# Patient Record
Sex: MALE | Race: BLACK OR AFRICAN AMERICAN | NOT HISPANIC OR LATINO | Employment: OTHER | URBAN - METROPOLITAN AREA
[De-identification: names, ages, dates, MRNs, and addresses within clinical notes are randomized per-mention and may not be internally consistent; named-entity substitution may affect disease eponyms.]

---

## 2017-01-04 ENCOUNTER — ANTI-COAG VISIT (OUTPATIENT)
Dept: CARDIOLOGY | Facility: CLINIC | Age: 77
End: 2017-01-04

## 2017-01-04 ENCOUNTER — LAB VISIT (OUTPATIENT)
Dept: LAB | Facility: HOSPITAL | Age: 77
End: 2017-01-04
Attending: INTERNAL MEDICINE
Payer: MEDICARE

## 2017-01-04 DIAGNOSIS — Z79.01 LONG-TERM (CURRENT) USE OF ANTICOAGULANTS: ICD-10-CM

## 2017-01-04 LAB
INR PPP: 2.4
PROTHROMBIN TIME: 26.2 SEC

## 2017-01-04 PROCEDURE — 36415 COLL VENOUS BLD VENIPUNCTURE: CPT | Mod: PO

## 2017-01-04 PROCEDURE — 85610 PROTHROMBIN TIME: CPT | Mod: PO

## 2017-01-18 ENCOUNTER — ANTI-COAG VISIT (OUTPATIENT)
Dept: CARDIOLOGY | Facility: CLINIC | Age: 77
End: 2017-01-18

## 2017-01-18 ENCOUNTER — LAB VISIT (OUTPATIENT)
Dept: LAB | Facility: HOSPITAL | Age: 77
End: 2017-01-18
Attending: INTERNAL MEDICINE
Payer: MEDICARE

## 2017-01-18 DIAGNOSIS — Z79.01 LONG-TERM (CURRENT) USE OF ANTICOAGULANTS: ICD-10-CM

## 2017-01-18 LAB
INR PPP: 2.6
PROTHROMBIN TIME: 28.3 SEC

## 2017-01-18 PROCEDURE — 85610 PROTHROMBIN TIME: CPT | Mod: PO

## 2017-01-18 PROCEDURE — 36415 COLL VENOUS BLD VENIPUNCTURE: CPT | Mod: PO

## 2017-02-16 ENCOUNTER — ANTI-COAG VISIT (OUTPATIENT)
Dept: CARDIOLOGY | Facility: CLINIC | Age: 77
End: 2017-02-16

## 2017-02-16 ENCOUNTER — LAB VISIT (OUTPATIENT)
Dept: LAB | Facility: HOSPITAL | Age: 77
End: 2017-02-16
Attending: INTERNAL MEDICINE
Payer: MEDICARE

## 2017-02-16 DIAGNOSIS — Z79.01 LONG-TERM (CURRENT) USE OF ANTICOAGULANTS: ICD-10-CM

## 2017-02-16 DIAGNOSIS — E11.8 TYPE II OR UNSPECIFIED TYPE DIABETES MELLITUS WITH UNSPECIFIED COMPLICATION, NOT STATED AS UNCONTROLLED: Primary | ICD-10-CM

## 2017-02-16 LAB
ALBUMIN SERPL BCP-MCNC: 4.1 G/DL
ALP SERPL-CCNC: 122 IU/L
ALT SERPL W/O P-5'-P-CCNC: 26 IU/L
ANION GAP SERPL CALC-SCNC: 11 MMOL/L
AST SERPL-CCNC: 23 IU/L
BASOPHILS # BLD AUTO: 0.02 K/UL
BASOPHILS NFR BLD: 0.5 %
BILIRUB SERPL-MCNC: 0.6 MG/DL
BUN SERPL-MCNC: 14 MG/DL
CALCIUM SERPL-MCNC: 9.1 MG/DL
CHLORIDE SERPL-SCNC: 102 MMOL/L
CHOLEST/HDLC SERPL: 4.5 {RATIO}
CO2 SERPL-SCNC: 30 MMOL/L
CREAT SERPL-MCNC: 1.31 MG/DL
DIFFERENTIAL METHOD: ABNORMAL
EOSINOPHIL # BLD AUTO: 0.1 K/UL
EOSINOPHIL NFR BLD: 2.6 %
ERYTHROCYTE [DISTWIDTH] IN BLOOD BY AUTOMATED COUNT: 12.9 %
EST. GFR  (AFRICAN AMERICAN): >60 ML/MIN/1.73 M^2
EST. GFR  (NON AFRICAN AMERICAN): 52.1 ML/MIN/1.73 M^2
GLUCOSE SERPL-MCNC: 115 MG/DL
HCT VFR BLD AUTO: 37.8 %
HDL/CHOLESTEROL RATIO: 22 %
HDLC SERPL-MCNC: 150 MG/DL
HDLC SERPL-MCNC: 33 MG/DL
HGB BLD-MCNC: 12.5 G/DL
LDLC SERPL CALC-MCNC: 90.6 MG/DL
LYMPHOCYTES # BLD AUTO: 1.4 K/UL
LYMPHOCYTES NFR BLD: 36.2 %
MCH RBC QN AUTO: 31.6 PG
MCHC RBC AUTO-ENTMCNC: 33.1 %
MCV RBC AUTO: 96 FL
MONOCYTES # BLD AUTO: 0.4 K/UL
MONOCYTES NFR BLD: 10.3 %
NEUTROPHILS # BLD AUTO: 2 K/UL
NEUTROPHILS NFR BLD: 50.4 %
NONHDLC SERPL-MCNC: 117 MG/DL
PLATELET # BLD AUTO: 183 K/UL
PMV BLD AUTO: 10.6 FL
POTASSIUM SERPL-SCNC: 4.1 MMOL/L
PROT SERPL-MCNC: 7.5 G/DL
RBC # BLD AUTO: 3.95 M/UL
SODIUM SERPL-SCNC: 143 MMOL/L
TRIGL SERPL-MCNC: 132 MG/DL
TSH SERPL DL<=0.005 MIU/L-ACNC: 1.83 UIU/ML
WBC # BLD AUTO: 3.87 K/UL

## 2017-02-16 PROCEDURE — 85025 COMPLETE CBC W/AUTO DIFF WBC: CPT | Mod: PO

## 2017-02-16 PROCEDURE — 83036 HEMOGLOBIN GLYCOSYLATED A1C: CPT | Mod: PO

## 2017-02-16 PROCEDURE — 84443 ASSAY THYROID STIM HORMONE: CPT

## 2017-02-16 PROCEDURE — 80053 COMPREHEN METABOLIC PANEL: CPT | Mod: PO

## 2017-02-16 PROCEDURE — 80061 LIPID PANEL: CPT

## 2017-02-17 LAB
ESTIMATED AVG GLUCOSE: 117 MG/DL
HBA1C MFR BLD HPLC: 5.7 %

## 2017-03-23 ENCOUNTER — LAB VISIT (OUTPATIENT)
Dept: LAB | Facility: HOSPITAL | Age: 77
End: 2017-03-23
Attending: INTERNAL MEDICINE
Payer: MEDICARE

## 2017-03-23 ENCOUNTER — ANTI-COAG VISIT (OUTPATIENT)
Dept: CARDIOLOGY | Facility: CLINIC | Age: 77
End: 2017-03-23

## 2017-03-23 DIAGNOSIS — Z79.01 LONG-TERM (CURRENT) USE OF ANTICOAGULANTS: ICD-10-CM

## 2017-03-23 LAB
INR PPP: 2.6
PROTHROMBIN TIME: 28 SEC

## 2017-03-23 PROCEDURE — 36415 COLL VENOUS BLD VENIPUNCTURE: CPT | Mod: PO

## 2017-03-23 PROCEDURE — 85610 PROTHROMBIN TIME: CPT | Mod: PO

## 2017-03-31 ENCOUNTER — OFFICE VISIT (OUTPATIENT)
Dept: CARDIOLOGY | Facility: CLINIC | Age: 77
End: 2017-03-31
Payer: MEDICARE

## 2017-03-31 VITALS
HEIGHT: 72 IN | WEIGHT: 174 LBS | OXYGEN SATURATION: 97 % | BODY MASS INDEX: 23.57 KG/M2 | DIASTOLIC BLOOD PRESSURE: 64 MMHG | SYSTOLIC BLOOD PRESSURE: 114 MMHG

## 2017-03-31 DIAGNOSIS — I25.10 CORONARY ARTERY DISEASE INVOLVING NATIVE CORONARY ARTERY OF NATIVE HEART WITHOUT ANGINA PECTORIS: Primary | ICD-10-CM

## 2017-03-31 DIAGNOSIS — I34.0 NON-RHEUMATIC MITRAL REGURGITATION: ICD-10-CM

## 2017-03-31 DIAGNOSIS — I47.10 SVT (SUPRAVENTRICULAR TACHYCARDIA): ICD-10-CM

## 2017-03-31 DIAGNOSIS — I48.0 PAF (PAROXYSMAL ATRIAL FIBRILLATION): ICD-10-CM

## 2017-03-31 DIAGNOSIS — Z92.29 HX: LONG TERM ANTICOAGULANT USE: ICD-10-CM

## 2017-03-31 DIAGNOSIS — I25.5 ISCHEMIC CARDIOMYOPATHY: ICD-10-CM

## 2017-03-31 PROCEDURE — 99214 OFFICE O/P EST MOD 30 MIN: CPT | Mod: S$GLB,,, | Performed by: INTERNAL MEDICINE

## 2017-03-31 PROCEDURE — 99999 PR PBB SHADOW E&M-EST. PATIENT-LVL III: CPT | Mod: PBBFAC,,, | Performed by: INTERNAL MEDICINE

## 2017-03-31 NOTE — LETTER
March 31, 2017        Olivia Mckeon MD  [address]  [address]  [facility] [place] [address]             [facility] - Cardiology  [address], [address]  [place] [address]  Phone: [phone]  Fax: [phone]   Patient: Bright Spain   MR Number: [id number]   YOB: 1940   Date of Visit: 3/31/2017       Dear Dr. Mckeon:    Thank you for referring Bright Spain to me for evaluation. Attached you will find relevant portions of my assessment and plan of care.    ECGs reviewed-NSR with PACs  LABS reviewed  Imaging including Echoes reviewed    Diagnostic:     Patient has a right dominant coronary artery.     - Left Main Coronary Artery:  The LM has luminal irregularities. There is ЮЛИЯ 3 flow.  - Left Anterior Descending Artery:  The proximal LAD has a 80% stenosis. There is ЮЛИЯ 3 flow.  Lesion Details: Eccentric shape, segment angulation of <45 degrees, smooth contour, mild to moderate calcification. The lesion is not ulcerated. No bifurcation is present.  The mid LAD has a 80% stenosis. There is ЮЛИЯ 3 flow.  Lesion Details: Eccentric shape, segment angulation of 45-90 degrees, irregular contour, moderate to heavy calcification. The lesion is not ulcerated. No bifurcation is present.  - D1:  The ostial D1 has a 90% stenosis. There is ЛЮИЯ 3 flow.  Lesion Details: Eccentric shape, segment angulation of 45-90 degrees, irregular contour, moderate to heavy calcification. The lesion is not ulcerated. Bifurcation is present. severe calcific ostial stenosis of large D1 supplying posterolateral region of LV  - Left Circumflex Artery:  The LCX is normal. There is ЮЛИЯ 3 flow. The remaining portion of the vessel is of small caliber. diminutive LCx with D1 off LAD supplying postero-lateral wall  - Right Coronary Artery:  The proximal RCA has a 70% stenosis. There is ЮЛИЯ 3 flow.  Lesion Details: Eccentric shape, moderate proximal tortuosity, segment angulation of >90 degrees, smooth contour,  none to mild calcification. The lesion is not ulcerated. No bifurcation is present.  The mid RCA has a 90% stenosis. There is ЮЛИЯ 3 flow.  Lesion Details: Concentric shape, severe proximal tortuosity, segment angulation of <45 degrees, smooth contour, none to mild calcification. The lesion is not ulcerated. No bifurcation is present.     Assessment:     1. Coronary artery disease involving native coronary artery of native heart without angina pectoris    2. Ischemic cardiomyopathy    3. Non-rheumatic mitral regurgitation    4. SVT (supraventricular tachycardia)    5. Chronic atrial fibrillation    6. HX: long term anticoagulant use        Plan:     Continue medications including asa 81 mg po daily  Activity as tolerated  F/u in 6 months. Patient want to be treated medically.     If you have questions, please do not hesitate to call me. I look forward to following Bright Spain along with you.    Sincerely,      Goldie Stuart MD            CC  No Recipients    Enclosure

## 2017-03-31 NOTE — PROGRESS NOTES
Subjective:   Patient ID:  Bright Spain is a 76 y.o. male who presents for follow-up of Follow-up      Problem List Items Addressed This Visit        Cardiac    SVT (supraventricular tachycardia)    Mitral regurgitation    Coronary artery disease involving native coronary artery of native heart without angina pectoris - Primary    Ischemic cardiomyopathy    PAF (paroxysmal atrial fibrillation)       Other    HX: long term anticoagulant use          HPI: Patient is here for f/u of severe CAD. As per previous discussion patient thought the process to get him stents or CABG was too slow so he is no longer want to do either. He denies chest pain or SOB. No orthopnea or PND. BP is control.    He wants to start cutting grass again.      Review of Systems   Constitution: Negative.   HENT: Negative.    Eyes: Negative.    Cardiovascular: Negative.    Respiratory: Negative.    Endocrine: Negative.    Hematologic/Lymphatic: Negative.    Skin: Negative.    Musculoskeletal: Negative.    Gastrointestinal: Negative.    Neurological: Negative.        Patient's Medications   New Prescriptions    No medications on file   Previous Medications    ALLOPURINOL (ZYLOPRIM) 300 MG TABLET    Take 300 mg by mouth once daily.    AMIODARONE (PACERONE) 400 MG TABLET    Take 1 tablet (400 mg total) by mouth once daily.    ASPIRIN (ECOTRIN) 81 MG EC TABLET    Take 81 mg by mouth once daily.    ATORVASTATIN (LIPITOR) 40 MG TABLET    Take 40 mg by mouth once daily.    CARTIA  MG 24 HR CAPSULE        CARVEDILOL (COREG) 12.5 MG TABLET    Take 1 tablet (12.5 mg total) by mouth 2 (two) times daily.    FINASTERIDE (PROSCAR) 5 MG TABLET    Take 5 mg by mouth once daily.    FUROSEMIDE (LASIX) 20 MG TABLET    Take 1 tablet (20 mg total) by mouth once daily.    LOSARTAN (COZAAR) 100 MG TABLET    Take 1 tablet (100 mg total) by mouth once daily.    MULTIVITAMIN WITH MINERALS TABLET    Take 1 tablet by mouth once daily.    NITROGLYCERIN (NITROSTAT) 0.4  MG SL TABLET    Place 1 tablet (0.4 mg total) under the tongue every 5 (five) minutes as needed for Chest pain.    POTASSIUM CHLORIDE SA (K-DUR,KLOR-CON) 20 MEQ TABLET    Take 1 tablet (20 mEq total) by mouth once daily.    TAMSULOSIN (FLOMAX) 0.4 MG CP24    Take 0.4 mg by mouth once daily.    WARFARIN (COUMADIN) 4 MG TABLET    Take 2 & 1/2 tablets daily except 2 tablets on Thursday or as directed by [facility]   Modified Medications    No medications on file   Discontinued Medications    No medications on file       Objective:   Physical Exam   Constitutional: He is oriented to person, place, and time. He appears well-developed and well-nourished. No distress.   Examination of the digits showed no clubbing or cyanosis   HENT:   Head: Normocephalic and atraumatic.   Eyes: Conjunctivae are normal. Pupils are equal, round, and reactive to light. Right eye exhibits no discharge.   Neck: Normal range of motion. Neck supple. No JVD present. No thyromegaly present.   No carotid bruits   Cardiovascular: Normal rate, regular rhythm, S1 normal, S2 normal, normal heart sounds, intact distal pulses and normal pulses.  PMI is not displaced.  Exam reveals no gallop, no friction rub and no opening snap.    No murmur heard.  Pulmonary/Chest: Effort normal and breath sounds normal. No respiratory distress. He has no wheezes. He has no rales. He exhibits no tenderness.   Abdominal: Soft. Bowel sounds are normal. He exhibits no distension and no mass. There is no tenderness. There is no guarding.   No hepatosplenomegaly   Musculoskeletal: Normal range of motion. He exhibits no edema or tenderness.   Lymphadenopathy:     He has no cervical adenopathy.   Neurological: He is alert and oriented to person, place, and time.   Skin: Skin is warm. No rash noted. He is not diaphoretic. No erythema.   Psychiatric: He has a normal mood and affect.   Nursing note and vitals reviewed.      ECGs reviewed-NSR with PACs  LABS reviewed  Imaging  including Echoes reviewed    Diagnostic:     Patient has a right dominant coronary artery.     - Left Main Coronary Artery:  The LM has luminal irregularities. There is ЮЛИЯ 3 flow.  - Left Anterior Descending Artery:  The proximal LAD has a 80% stenosis. There is ЮЛИЯ 3 flow.  Lesion Details: Eccentric shape, segment angulation of <45 degrees, smooth contour, mild to moderate calcification. The lesion is not ulcerated. No bifurcation is present.  The mid LAD has a 80% stenosis. There is ЮЛИЯ 3 flow.  Lesion Details: Eccentric shape, segment angulation of 45-90 degrees, irregular contour, moderate to heavy calcification. The lesion is not ulcerated. No bifurcation is present.  - D1:  The ostial D1 has a 90% stenosis. There is ЮЛИЯ 3 flow.  Lesion Details: Eccentric shape, segment angulation of 45-90 degrees, irregular contour, moderate to heavy calcification. The lesion is not ulcerated. Bifurcation is present. severe calcific ostial stenosis of large D1 supplying posterolateral region of LV  - Left Circumflex Artery:  The LCX is normal. There is ЮЛИЯ 3 flow. The remaining portion of the vessel is of small caliber. diminutive LCx with D1 off LAD supplying postero-lateral wall  - Right Coronary Artery:  The proximal RCA has a 70% stenosis. There is ЮЛИЯ 3 flow.  Lesion Details: Eccentric shape, moderate proximal tortuosity, segment angulation of >90 degrees, smooth contour, none to mild calcification. The lesion is not ulcerated. No bifurcation is present.  The mid RCA has a 90% stenosis. There is ЮЛИЯ 3 flow.  Lesion Details: Concentric shape, severe proximal tortuosity, segment angulation of <45 degrees, smooth contour, none to mild calcification. The lesion is not ulcerated. No bifurcation is present.     Assessment:     1. Coronary artery disease involving native coronary artery of native heart without angina pectoris    2. Ischemic cardiomyopathy    3. Non-rheumatic mitral regurgitation    4. SVT  (supraventricular tachycardia)    5. HX: long term anticoagulant use    6. PAF (paroxysmal atrial fibrillation)        Plan:     Continue medications including asa 81 mg po daily  Activity as tolerated  F/u in 6 months. Patient want to be treated medically.

## 2017-04-10 ENCOUNTER — TELEPHONE (OUTPATIENT)
Dept: CARDIOTHORACIC SURGERY | Facility: CLINIC | Age: 77
End: 2017-04-10

## 2017-04-10 NOTE — TELEPHONE ENCOUNTER
Pt did not show up for his scheduled CT scan and Dr. Hermosillo f/u. Called to check on the pt, he reports that he is no long interested in finding out what is going on with his lung nodules. He was prepared for surgery last year and after surgery was not performed, he is no longer interested in following up with his pulmonary nodules.   He voices understanding that IR biopsy was performed and the nodules were smaller in size, today's appt was for a f/u CT to assess if the nodules had changed in size. Pt does not want to r/s these appts, cancelled both CT and Dr. Hermosillo.

## 2017-04-11 NOTE — PROGRESS NOTES
I'm sorry for the confusion, but we do not provide advise regarding ASA 81mg.  (We will ask pts to avoid higher doses of ASA used for pain treatment.)  Regardless, I would recommend that he follows his cardiologist's advise regarding ASA.

## 2017-04-11 NOTE — PROGRESS NOTES
Per patient we stopped him from taking a Baby ASA about 6 months ago.  Patient reports Dr Stuart wants him to restart Baby ASA.

## 2017-04-27 ENCOUNTER — ANTI-COAG VISIT (OUTPATIENT)
Dept: CARDIOLOGY | Facility: CLINIC | Age: 77
End: 2017-04-27

## 2017-04-27 ENCOUNTER — LAB VISIT (OUTPATIENT)
Dept: LAB | Facility: HOSPITAL | Age: 77
End: 2017-04-27
Attending: INTERNAL MEDICINE
Payer: MEDICARE

## 2017-04-27 DIAGNOSIS — Z79.01 LONG-TERM (CURRENT) USE OF ANTICOAGULANTS: ICD-10-CM

## 2017-04-27 LAB
INR PPP: 2
PROTHROMBIN TIME: 21.5 SEC

## 2017-04-27 PROCEDURE — 36415 COLL VENOUS BLD VENIPUNCTURE: CPT | Mod: PO

## 2017-04-27 PROCEDURE — 85610 PROTHROMBIN TIME: CPT | Mod: PO

## 2017-06-08 ENCOUNTER — ANTI-COAG VISIT (OUTPATIENT)
Dept: CARDIOLOGY | Facility: CLINIC | Age: 77
End: 2017-06-08

## 2017-06-08 ENCOUNTER — LAB VISIT (OUTPATIENT)
Dept: LAB | Facility: HOSPITAL | Age: 77
End: 2017-06-08
Attending: INTERNAL MEDICINE
Payer: MEDICARE

## 2017-06-08 DIAGNOSIS — Z79.01 LONG-TERM (CURRENT) USE OF ANTICOAGULANTS: ICD-10-CM

## 2017-06-08 LAB
INR PPP: 2.2
PROTHROMBIN TIME: 23.9 SEC

## 2017-06-08 PROCEDURE — 36415 COLL VENOUS BLD VENIPUNCTURE: CPT | Mod: PO

## 2017-06-08 PROCEDURE — 85610 PROTHROMBIN TIME: CPT | Mod: PO

## 2017-07-21 ENCOUNTER — ANTI-COAG VISIT (OUTPATIENT)
Dept: CARDIOLOGY | Facility: CLINIC | Age: 77
End: 2017-07-21

## 2017-07-21 ENCOUNTER — LAB VISIT (OUTPATIENT)
Dept: LAB | Facility: HOSPITAL | Age: 77
End: 2017-07-21
Attending: INTERNAL MEDICINE
Payer: MEDICARE

## 2017-07-21 DIAGNOSIS — Z79.01 LONG-TERM (CURRENT) USE OF ANTICOAGULANTS: ICD-10-CM

## 2017-07-21 LAB
INR PPP: 3.1
PROTHROMBIN TIME: 33.3 SEC

## 2017-07-21 PROCEDURE — 36415 COLL VENOUS BLD VENIPUNCTURE: CPT | Mod: PO

## 2017-07-21 PROCEDURE — 85610 PROTHROMBIN TIME: CPT | Mod: PO

## 2017-08-16 ENCOUNTER — ANTI-COAG VISIT (OUTPATIENT)
Dept: CARDIOLOGY | Facility: CLINIC | Age: 77
End: 2017-08-16

## 2017-08-16 ENCOUNTER — LAB VISIT (OUTPATIENT)
Dept: LAB | Facility: HOSPITAL | Age: 77
End: 2017-08-16
Attending: INTERNAL MEDICINE
Payer: MEDICARE

## 2017-08-16 DIAGNOSIS — Z79.01 LONG-TERM (CURRENT) USE OF ANTICOAGULANTS: ICD-10-CM

## 2017-08-16 LAB
INR PPP: 1.6
PROTHROMBIN TIME: 17.8 SEC

## 2017-08-16 PROCEDURE — 85610 PROTHROMBIN TIME: CPT | Mod: PO

## 2017-08-16 PROCEDURE — 36415 COLL VENOUS BLD VENIPUNCTURE: CPT | Mod: PO

## 2017-08-28 ENCOUNTER — ANTI-COAG VISIT (OUTPATIENT)
Dept: CARDIOLOGY | Facility: CLINIC | Age: 77
End: 2017-08-28

## 2017-08-28 ENCOUNTER — LAB VISIT (OUTPATIENT)
Dept: LAB | Facility: HOSPITAL | Age: 77
End: 2017-08-28
Attending: INTERNAL MEDICINE
Payer: MEDICARE

## 2017-08-28 DIAGNOSIS — Z79.01 LONG-TERM (CURRENT) USE OF ANTICOAGULANTS: ICD-10-CM

## 2017-08-28 LAB
INR PPP: 2.9
PROTHROMBIN TIME: 31.5 SEC

## 2017-08-28 PROCEDURE — 36415 COLL VENOUS BLD VENIPUNCTURE: CPT | Mod: PO

## 2017-08-28 PROCEDURE — 85610 PROTHROMBIN TIME: CPT | Mod: PO

## 2017-09-12 ENCOUNTER — LAB VISIT (OUTPATIENT)
Dept: LAB | Facility: HOSPITAL | Age: 77
End: 2017-09-12
Attending: INTERNAL MEDICINE
Payer: MEDICARE

## 2017-09-12 ENCOUNTER — ANTI-COAG VISIT (OUTPATIENT)
Dept: CARDIOLOGY | Facility: CLINIC | Age: 77
End: 2017-09-12

## 2017-09-12 DIAGNOSIS — Z79.01 LONG-TERM (CURRENT) USE OF ANTICOAGULANTS: ICD-10-CM

## 2017-09-12 LAB
INR PPP: 2.3
PROTHROMBIN TIME: 24.4 SEC

## 2017-09-12 PROCEDURE — 36415 COLL VENOUS BLD VENIPUNCTURE: CPT | Mod: PO

## 2017-09-12 PROCEDURE — 85610 PROTHROMBIN TIME: CPT | Mod: PO

## 2017-10-03 ENCOUNTER — LAB VISIT (OUTPATIENT)
Dept: LAB | Facility: HOSPITAL | Age: 77
End: 2017-10-03
Attending: INTERNAL MEDICINE
Payer: MEDICARE

## 2017-10-03 ENCOUNTER — ANTI-COAG VISIT (OUTPATIENT)
Dept: CARDIOLOGY | Facility: CLINIC | Age: 77
End: 2017-10-03

## 2017-10-03 DIAGNOSIS — Z79.01 LONG-TERM (CURRENT) USE OF ANTICOAGULANTS: ICD-10-CM

## 2017-10-03 LAB
INR PPP: 2.8
PROTHROMBIN TIME: 29.7 SEC

## 2017-10-03 PROCEDURE — 85610 PROTHROMBIN TIME: CPT | Mod: PO

## 2017-10-03 PROCEDURE — 36415 COLL VENOUS BLD VENIPUNCTURE: CPT | Mod: PO

## 2017-10-03 NOTE — PROGRESS NOTES
Patient was given lab result, reports no changes, nothing new, no bleeding, verified correct dose of coumadin

## 2017-10-24 ENCOUNTER — LAB VISIT (OUTPATIENT)
Dept: LAB | Facility: HOSPITAL | Age: 77
End: 2017-10-24
Attending: INTERNAL MEDICINE
Payer: MEDICARE

## 2017-10-24 ENCOUNTER — ANTI-COAG VISIT (OUTPATIENT)
Dept: CARDIOLOGY | Facility: CLINIC | Age: 77
End: 2017-10-24

## 2017-10-24 DIAGNOSIS — Z79.01 LONG-TERM (CURRENT) USE OF ANTICOAGULANTS: ICD-10-CM

## 2017-10-24 LAB
INR PPP: 2.4
PROTHROMBIN TIME: 26.1 SEC

## 2017-10-24 PROCEDURE — 36415 COLL VENOUS BLD VENIPUNCTURE: CPT | Mod: PO

## 2017-10-24 PROCEDURE — 85610 PROTHROMBIN TIME: CPT | Mod: PO

## 2017-11-03 ENCOUNTER — OFFICE VISIT (OUTPATIENT)
Dept: CARDIOLOGY | Facility: CLINIC | Age: 77
End: 2017-11-03
Payer: MEDICARE

## 2017-11-03 VITALS
HEIGHT: 72 IN | SYSTOLIC BLOOD PRESSURE: 129 MMHG | OXYGEN SATURATION: 97 % | WEIGHT: 170.5 LBS | HEART RATE: 66 BPM | BODY MASS INDEX: 23.09 KG/M2 | DIASTOLIC BLOOD PRESSURE: 71 MMHG

## 2017-11-03 DIAGNOSIS — I48.0 PAROXYSMAL ATRIAL FIBRILLATION: Primary | ICD-10-CM

## 2017-11-03 DIAGNOSIS — I27.20 PULMONARY HTN: ICD-10-CM

## 2017-11-03 DIAGNOSIS — I10 ESSENTIAL HYPERTENSION: ICD-10-CM

## 2017-11-03 DIAGNOSIS — I25.10 CORONARY ARTERY DISEASE INVOLVING NATIVE CORONARY ARTERY OF NATIVE HEART WITHOUT ANGINA PECTORIS: ICD-10-CM

## 2017-11-03 DIAGNOSIS — I47.10 SVT (SUPRAVENTRICULAR TACHYCARDIA): ICD-10-CM

## 2017-11-03 DIAGNOSIS — I34.0 MODERATE MITRAL INSUFFICIENCY: ICD-10-CM

## 2017-11-03 DIAGNOSIS — I25.5 ISCHEMIC CARDIOMYOPATHY: ICD-10-CM

## 2017-11-03 PROCEDURE — 99214 OFFICE O/P EST MOD 30 MIN: CPT | Mod: S$GLB,,, | Performed by: INTERNAL MEDICINE

## 2017-11-03 PROCEDURE — 99499 UNLISTED E&M SERVICE: CPT | Mod: S$GLB,,, | Performed by: INTERNAL MEDICINE

## 2017-11-03 PROCEDURE — 99999 PR PBB SHADOW E&M-EST. PATIENT-LVL III: CPT | Mod: PBBFAC,,, | Performed by: INTERNAL MEDICINE

## 2017-11-03 NOTE — PROGRESS NOTES
Patient, Bright Spain (MRN #[id number]), presented with a recent Estimated PA Systolic Pressure greater than 40 mmHG consistent with the definition of pulmonary hypertension (ICD10 - I27.0).    Est. PA Systolic Pressure   Date Value Ref Range Status   04/11/2016 58.56 (A)       The patient's pulmonary hypertension was monitored, evaluated, addressed and/or treated. This addendum to the medical record is made on 11/03/2017.

## 2017-11-03 NOTE — PROGRESS NOTES
Subjective:   Patient ID:  Bright Spain is a 77 y.o. male who presents for follow-up of Follow-up      Problem List Items Addressed This Visit        Pulmonary    Pulmonary HTN       Cardiac/Vascular    Essential hypertension    Atrial fibrillation - Primary    SVT (supraventricular tachycardia)    Coronary artery disease involving native coronary artery of native heart without angina pectoris    Ischemic cardiomyopathy    Moderate mitral insufficiency          HPI: Patient is here for f/u of severe CAD with ICM. He is doing well with no complaints.  He denies chest pain or SOB. No orthopnea or PND. BP is control. Patient is being treated for severe CAD, CABG was recommended but patient not interested. He is active cutting grass 2 times weekly.         Review of Systems   Constitution: Negative.   HENT: Negative.    Eyes: Negative.    Cardiovascular: Negative.    Respiratory: Negative.    Endocrine: Negative.    Hematologic/Lymphatic: Negative.    Skin: Negative.    Musculoskeletal: Negative.    Gastrointestinal: Negative.    Neurological: Negative.        Patient's Medications   New Prescriptions    No medications on file   Previous Medications    ALLOPURINOL (ZYLOPRIM) 300 MG TABLET    Take 300 mg by mouth once daily.    AMIODARONE (PACERONE) 400 MG TABLET    Take 1 tablet (400 mg total) by mouth once daily.    ASPIRIN (ECOTRIN) 81 MG EC TABLET    Take 81 mg by mouth once daily.    ATORVASTATIN (LIPITOR) 40 MG TABLET    Take 40 mg by mouth once daily.    CARTIA  MG 24 HR CAPSULE        CARVEDILOL (COREG) 12.5 MG TABLET    Take 1 tablet (12.5 mg total) by mouth 2 (two) times daily.    FINASTERIDE (PROSCAR) 5 MG TABLET    Take 5 mg by mouth once daily.    FUROSEMIDE (LASIX) 20 MG TABLET    Take 1 tablet (20 mg total) by mouth once daily.    LOSARTAN (COZAAR) 100 MG TABLET    Take 1 tablet (100 mg total) by mouth once daily.    MULTIVITAMIN WITH MINERALS TABLET    Take 1 tablet by mouth once daily.     NITROGLYCERIN (NITROSTAT) 0.4 MG SL TABLET    Place 1 tablet (0.4 mg total) under the tongue every 5 (five) minutes as needed for Chest pain.    POTASSIUM CHLORIDE SA (K-DUR,KLOR-CON) 20 MEQ TABLET    Take 1 tablet (20 mEq total) by mouth once daily.    TAMSULOSIN (FLOMAX) 0.4 MG CP24    Take 0.4 mg by mouth once daily.    WARFARIN (COUMADIN) 4 MG TABLET    Take 2 & 1/2 tablets daily except 2 tablets on Thursday or as directed by [facility]   Modified Medications    No medications on file   Discontinued Medications    No medications on file       Objective:   Physical Exam   Constitutional: He is oriented to person, place, and time. He appears well-developed and well-nourished. No distress.   Examination of the digits showed no clubbing or cyanosis   HENT:   Head: Normocephalic and atraumatic.   Eyes: Conjunctivae are normal. Pupils are equal, round, and reactive to light. Right eye exhibits no discharge.   Neck: Normal range of motion. Neck supple. No JVD present. No thyromegaly present.   No carotid bruits   Cardiovascular: Normal rate, regular rhythm, S1 normal, S2 normal, normal heart sounds, intact distal pulses and normal pulses.  PMI is not displaced.  Exam reveals no gallop, no friction rub and no opening snap.    No murmur heard.  Pulmonary/Chest: Effort normal and breath sounds normal. No respiratory distress. He has no wheezes. He has no rales. He exhibits no tenderness.   Abdominal: Soft. Bowel sounds are normal. He exhibits no distension and no mass. There is no tenderness. There is no guarding.   No hepatosplenomegaly   Musculoskeletal: Normal range of motion. He exhibits no edema or tenderness.   Lymphadenopathy:     He has no cervical adenopathy.   Neurological: He is alert and oriented to person, place, and time.   Skin: Skin is warm. No rash noted. He is not diaphoretic. No erythema.   Psychiatric: He has a normal mood and affect.   Nursing note and vitals reviewed.      ECGs reviewed-NSR with  PACs  LABS reviewed  Imaging including Echoes reviewed    Diagnostic:     Patient has a right dominant coronary artery.     - Left Main Coronary Artery:  The LM has luminal irregularities. There is ЮЛИЯ 3 flow.  - Left Anterior Descending Artery:  The proximal LAD has a 80% stenosis. There is ЮЛИЯ 3 flow.  Lesion Details: Eccentric shape, segment angulation of <45 degrees, smooth contour, mild to moderate calcification. The lesion is not ulcerated. No bifurcation is present.  The mid LAD has a 80% stenosis. There is ЮЛИЯ 3 flow.  Lesion Details: Eccentric shape, segment angulation of 45-90 degrees, irregular contour, moderate to heavy calcification. The lesion is not ulcerated. No bifurcation is present.  - D1:  The ostial D1 has a 90% stenosis. There is ЮЛИЯ 3 flow.  Lesion Details: Eccentric shape, segment angulation of 45-90 degrees, irregular contour, moderate to heavy calcification. The lesion is not ulcerated. Bifurcation is present. severe calcific ostial stenosis of large D1 supplying posterolateral region of LV  - Left Circumflex Artery:  The LCX is normal. There is ЮЛИЯ 3 flow. The remaining portion of the vessel is of small caliber. diminutive LCx with D1 off LAD supplying postero-lateral wall  - Right Coronary Artery:  The proximal RCA has a 70% stenosis. There is ЮЛИЯ 3 flow.  Lesion Details: Eccentric shape, moderate proximal tortuosity, segment angulation of >90 degrees, smooth contour, none to mild calcification. The lesion is not ulcerated. No bifurcation is present.  The mid RCA has a 90% stenosis. There is ЮЛИЯ 3 flow.  Lesion Details: Concentric shape, severe proximal tortuosity, segment angulation of <45 degrees, smooth contour, none to mild calcification. The lesion is not ulcerated. No bifurcation is present.     Assessment:     1. Paroxysmal atrial fibrillation    2. Essential hypertension    3. SVT (supraventricular tachycardia)    4. Coronary artery disease involving native coronary artery  of native heart without angina pectoris    5. Ischemic cardiomyopathy    6. Moderate mitral insufficiency    7. Pulmonary HTN        Plan:     Continue medications including asa 81 mg po daily  Activity as tolerated  Low salt diet  F/u in 6 months.

## 2017-11-17 ENCOUNTER — LAB VISIT (OUTPATIENT)
Dept: LAB | Facility: HOSPITAL | Age: 77
End: 2017-11-17
Attending: INTERNAL MEDICINE
Payer: MEDICARE

## 2017-11-17 DIAGNOSIS — E11.9 DIABETES MELLITUS WITHOUT COMPLICATION: Primary | ICD-10-CM

## 2017-11-17 LAB
ALBUMIN SERPL BCP-MCNC: 4.4 G/DL
ALP SERPL-CCNC: 114 U/L
ALT SERPL W/O P-5'-P-CCNC: 26 U/L
ANION GAP SERPL CALC-SCNC: 14 MMOL/L
AST SERPL-CCNC: 28 U/L
BASOPHILS # BLD AUTO: 0.03 K/UL
BASOPHILS NFR BLD: 0.8 %
BILIRUB SERPL-MCNC: 0.7 MG/DL
BUN SERPL-MCNC: 15 MG/DL
CALCIUM SERPL-MCNC: 9.1 MG/DL
CHLORIDE SERPL-SCNC: 101 MMOL/L
CHOLEST SERPL-MCNC: 168 MG/DL
CHOLEST/HDLC SERPL: 4.2 {RATIO}
CO2 SERPL-SCNC: 28 MMOL/L
CREAT SERPL-MCNC: 1.22 MG/DL
CREAT UR-MCNC: 47 MG/DL
DIFFERENTIAL METHOD: ABNORMAL
EOSINOPHIL # BLD AUTO: 0.1 K/UL
EOSINOPHIL NFR BLD: 2.1 %
ERYTHROCYTE [DISTWIDTH] IN BLOOD BY AUTOMATED COUNT: 13.6 %
EST. GFR  (AFRICAN AMERICAN): >60 ML/MIN/1.73 M^2
EST. GFR  (NON AFRICAN AMERICAN): 56.4 ML/MIN/1.73 M^2
ESTIMATED AVG GLUCOSE: 117 MG/DL
GLUCOSE SERPL-MCNC: 89 MG/DL
HBA1C MFR BLD HPLC: 5.7 %
HCT VFR BLD AUTO: 39.7 %
HDLC SERPL-MCNC: 40 MG/DL
HDLC SERPL: 23.8 %
HGB BLD-MCNC: 12.7 G/DL
LDLC SERPL CALC-MCNC: 104.4 MG/DL
LYMPHOCYTES # BLD AUTO: 1.3 K/UL
LYMPHOCYTES NFR BLD: 35 %
MCH RBC QN AUTO: 31.1 PG
MCHC RBC AUTO-ENTMCNC: 32 G/DL
MCV RBC AUTO: 97 FL
MICROALBUMIN UR DL<=1MG/L-MCNC: <2.5 UG/ML
MICROALBUMIN/CREATININE RATIO: NORMAL UG/MG
MONOCYTES # BLD AUTO: 0.4 K/UL
MONOCYTES NFR BLD: 10.9 %
NEUTROPHILS # BLD AUTO: 1.9 K/UL
NEUTROPHILS NFR BLD: 50.9 %
NONHDLC SERPL-MCNC: 128 MG/DL
PLATELET # BLD AUTO: 179 K/UL
PMV BLD AUTO: 10.8 FL
POTASSIUM SERPL-SCNC: 4.4 MMOL/L
PROT SERPL-MCNC: 7.6 G/DL
RBC # BLD AUTO: 4.08 M/UL
SODIUM SERPL-SCNC: 143 MMOL/L
TRIGL SERPL-MCNC: 118 MG/DL
TSH SERPL DL<=0.005 MIU/L-ACNC: 2.63 UIU/ML
WBC # BLD AUTO: 3.77 K/UL

## 2017-11-17 PROCEDURE — 80053 COMPREHEN METABOLIC PANEL: CPT | Mod: PO

## 2017-11-17 PROCEDURE — 83036 HEMOGLOBIN GLYCOSYLATED A1C: CPT | Mod: PO

## 2017-11-17 PROCEDURE — 85025 COMPLETE CBC W/AUTO DIFF WBC: CPT | Mod: PO

## 2017-11-17 PROCEDURE — 82570 ASSAY OF URINE CREATININE: CPT | Mod: PO

## 2017-11-17 PROCEDURE — 80061 LIPID PANEL: CPT

## 2017-11-17 PROCEDURE — 84443 ASSAY THYROID STIM HORMONE: CPT | Mod: PO

## 2017-11-17 PROCEDURE — 36415 COLL VENOUS BLD VENIPUNCTURE: CPT | Mod: PO

## 2017-12-05 ENCOUNTER — LAB VISIT (OUTPATIENT)
Dept: LAB | Facility: HOSPITAL | Age: 77
End: 2017-12-05
Attending: INTERNAL MEDICINE
Payer: MEDICARE

## 2017-12-05 ENCOUNTER — ANTI-COAG VISIT (OUTPATIENT)
Dept: CARDIOLOGY | Facility: CLINIC | Age: 77
End: 2017-12-05

## 2017-12-05 DIAGNOSIS — Z79.01 LONG TERM (CURRENT) USE OF ANTICOAGULANTS: Primary | ICD-10-CM

## 2017-12-05 DIAGNOSIS — Z79.01 LONG-TERM (CURRENT) USE OF ANTICOAGULANTS: ICD-10-CM

## 2017-12-05 LAB
INR PPP: 2.5
PROTHROMBIN TIME: 26.9 SEC

## 2017-12-05 PROCEDURE — 85610 PROTHROMBIN TIME: CPT | Mod: PO

## 2017-12-05 PROCEDURE — 36415 COLL VENOUS BLD VENIPUNCTURE: CPT | Mod: PO

## 2018-01-08 NOTE — PROGRESS NOTES
Patient called 01/08/18 to inform us that on 1/16/18 He will have to go to ([email or web address]) because of his insurance. Patient will call us with info from ([facility]).

## 2018-01-17 ENCOUNTER — ANTI-COAG VISIT (OUTPATIENT)
Dept: CARDIOLOGY | Facility: CLINIC | Age: 78
End: 2018-01-17

## 2018-01-17 LAB — INR PPP: 2.5

## 2018-02-27 LAB — INR PPP: 3.5

## 2018-02-28 ENCOUNTER — ANTI-COAG VISIT (OUTPATIENT)
Dept: CARDIOLOGY | Facility: CLINIC | Age: 78
End: 2018-02-28

## 2018-03-13 LAB — INR PPP: 2.2

## 2018-03-14 ENCOUNTER — ANTI-COAG VISIT (OUTPATIENT)
Dept: CARDIOLOGY | Facility: CLINIC | Age: 78
End: 2018-03-14

## 2018-04-24 LAB — INR PPP: 2.7

## 2018-04-25 ENCOUNTER — ANTI-COAG VISIT (OUTPATIENT)
Dept: CARDIOLOGY | Facility: CLINIC | Age: 78
End: 2018-04-25

## 2018-06-05 LAB — INR PPP: 3.4

## 2018-06-06 ENCOUNTER — ANTI-COAG VISIT (OUTPATIENT)
Dept: CARDIOLOGY | Facility: CLINIC | Age: 78
End: 2018-06-06

## 2018-06-19 LAB — INR PPP: 2.4

## 2018-06-20 ENCOUNTER — ANTI-COAG VISIT (OUTPATIENT)
Dept: CARDIOLOGY | Facility: CLINIC | Age: 78
End: 2018-06-20

## 2018-06-20 DIAGNOSIS — Z79.01 LONG TERM (CURRENT) USE OF ANTICOAGULANTS: Primary | ICD-10-CM

## 2018-07-18 LAB — INR PPP: 2.9

## 2018-07-19 ENCOUNTER — ANTI-COAG VISIT (OUTPATIENT)
Dept: CARDIOLOGY | Facility: CLINIC | Age: 78
End: 2018-07-19

## 2018-07-30 ENCOUNTER — OFFICE VISIT (OUTPATIENT)
Dept: CARDIOLOGY | Facility: CLINIC | Age: 78
End: 2018-07-30
Payer: MEDICARE

## 2018-07-30 VITALS
BODY MASS INDEX: 22.95 KG/M2 | HEIGHT: 73 IN | HEART RATE: 69 BPM | WEIGHT: 173.13 LBS | DIASTOLIC BLOOD PRESSURE: 56 MMHG | OXYGEN SATURATION: 97 % | SYSTOLIC BLOOD PRESSURE: 103 MMHG

## 2018-07-30 DIAGNOSIS — I47.10 SVT (SUPRAVENTRICULAR TACHYCARDIA): ICD-10-CM

## 2018-07-30 DIAGNOSIS — I25.10 CORONARY ARTERY DISEASE INVOLVING NATIVE CORONARY ARTERY OF NATIVE HEART WITHOUT ANGINA PECTORIS: ICD-10-CM

## 2018-07-30 DIAGNOSIS — I34.0 MODERATE MITRAL INSUFFICIENCY: ICD-10-CM

## 2018-07-30 DIAGNOSIS — I25.5 ISCHEMIC CARDIOMYOPATHY: ICD-10-CM

## 2018-07-30 DIAGNOSIS — I48.0 PAF (PAROXYSMAL ATRIAL FIBRILLATION): Primary | ICD-10-CM

## 2018-07-30 DIAGNOSIS — I10 ESSENTIAL HYPERTENSION: ICD-10-CM

## 2018-07-30 PROCEDURE — 3078F DIAST BP <80 MM HG: CPT | Mod: CPTII,S$GLB,, | Performed by: INTERNAL MEDICINE

## 2018-07-30 PROCEDURE — 99214 OFFICE O/P EST MOD 30 MIN: CPT | Mod: S$GLB,,, | Performed by: INTERNAL MEDICINE

## 2018-07-30 PROCEDURE — 3074F SYST BP LT 130 MM HG: CPT | Mod: CPTII,S$GLB,, | Performed by: INTERNAL MEDICINE

## 2018-07-30 NOTE — PROGRESS NOTES
Subjective:   Patient ID:  Bright Spain is a 77 y.o. male who presents for follow-up of Follow-up      Problem List Items Addressed This Visit        Cardiac/Vascular    Essential hypertension    SVT (supraventricular tachycardia)    Coronary artery disease involving native coronary artery of native heart without angina pectoris    Ischemic cardiomyopathy    PAF (paroxysmal atrial fibrillation) - Primary    Moderate mitral insufficiency          HPI: Patient is here for f/u of severe CAD with ICM. He is doing well with no complaints.  He denies chest pain or SOB. No orthopnea or PND. BP is control. Patient is being treated for severe CAD, CABG was recommended but patient not interested. He is active cutting grass 2 times weekly and walking occasionally. He think he is able ot walk about a mile. He is compliant with medications and tolerating well. No bleeding on coumadin.     Patient not interested in any further invasive procedures.       Review of Systems   Constitution: Negative.   HENT: Negative.    Eyes: Negative.    Cardiovascular: Negative.    Respiratory: Negative.    Endocrine: Negative.    Hematologic/Lymphatic: Negative.    Skin: Negative.    Musculoskeletal: Negative.    Gastrointestinal: Negative.    Neurological: Negative.        Patient's Medications   New Prescriptions    No medications on file   Previous Medications    ALLOPURINOL (ZYLOPRIM) 300 MG TABLET    Take 300 mg by mouth once daily.    AMIODARONE (PACERONE) 400 MG TABLET    Take 1 tablet (400 mg total) by mouth once daily.    ASPIRIN (ECOTRIN) 81 MG EC TABLET    Take 81 mg by mouth once daily.    ATORVASTATIN (LIPITOR) 40 MG TABLET    Take 40 mg by mouth once daily.    CARTIA  MG 24 HR CAPSULE        CARVEDILOL (COREG) 12.5 MG TABLET    Take 1 tablet (12.5 mg total) by mouth 2 (two) times daily.    FINASTERIDE (PROSCAR) 5 MG TABLET    Take 5 mg by mouth once daily.    FUROSEMIDE (LASIX) 20 MG TABLET    Take 1 tablet (20 mg total) by  mouth once daily.    LOSARTAN (COZAAR) 100 MG TABLET    Take 1 tablet (100 mg total) by mouth once daily.    MULTIVITAMIN WITH MINERALS TABLET    Take 1 tablet by mouth once daily.    NITROGLYCERIN (NITROSTAT) 0.4 MG SL TABLET    Place 1 tablet (0.4 mg total) under the tongue every 5 (five) minutes as needed for Chest pain.    POTASSIUM CHLORIDE SA (K-DUR,KLOR-CON) 20 MEQ TABLET    Take 1 tablet (20 mEq total) by mouth once daily.    TAMSULOSIN (FLOMAX) 0.4 MG CP24    Take 0.4 mg by mouth once daily.    WARFARIN (COUMADIN) 4 MG TABLET    Take 2 & 1/2 tablets daily except 2 tablets on Thursday or as directed by [facility]   Modified Medications    No medications on file   Discontinued Medications    No medications on file       Objective:   Physical Exam   Constitutional: He is oriented to person, place, and time. He appears well-developed and well-nourished. No distress.   Examination of the digits showed no clubbing or cyanosis   HENT:   Head: Normocephalic and atraumatic.   Eyes: Conjunctivae are normal. Pupils are equal, round, and reactive to light. Right eye exhibits no discharge.   Neck: Normal range of motion. Neck supple. No JVD present. No thyromegaly present.   No carotid bruits   Cardiovascular: Normal rate, regular rhythm, S1 normal, S2 normal, normal heart sounds, intact distal pulses and normal pulses.  PMI is not displaced.  Exam reveals no gallop, no friction rub and no opening snap.    No murmur heard.  Pulmonary/Chest: Effort normal and breath sounds normal. No respiratory distress. He has no wheezes. He has no rales. He exhibits no tenderness.   Abdominal: Soft. Bowel sounds are normal. He exhibits no distension and no mass. There is no tenderness. There is no guarding.   No hepatosplenomegaly   Musculoskeletal: Normal range of motion. He exhibits no edema or tenderness.   Lymphadenopathy:     He has no cervical adenopathy.   Neurological: He is alert and oriented to person, place, and time.    Skin: Skin is warm. No rash noted. He is not diaphoretic. No erythema.   Psychiatric: He has a normal mood and affect.   Nursing note and vitals reviewed.      ECGs reviewed-NSR with PACs  LABS reviewed  Imaging including Echoes reviewed    Diagnostic:     Patient has a right dominant coronary artery.     - Left Main Coronary Artery:  The LM has luminal irregularities. There is ЮЛИЯ 3 flow.  - Left Anterior Descending Artery:  The proximal LAD has a 80% stenosis. There is ЮЛИЯ 3 flow.  Lesion Details: Eccentric shape, segment angulation of <45 degrees, smooth contour, mild to moderate calcification. The lesion is not ulcerated. No bifurcation is present.  The mid LAD has a 80% stenosis. There is ЮЛИЯ 3 flow.  Lesion Details: Eccentric shape, segment angulation of 45-90 degrees, irregular contour, moderate to heavy calcification. The lesion is not ulcerated. No bifurcation is present.  - D1:  The ostial D1 has a 90% stenosis. There is ЮЛИЯ 3 flow.  Lesion Details: Eccentric shape, segment angulation of 45-90 degrees, irregular contour, moderate to heavy calcification. The lesion is not ulcerated. Bifurcation is present. severe calcific ostial stenosis of large D1 supplying posterolateral region of LV  - Left Circumflex Artery:  The LCX is normal. There is ЮЛИЯ 3 flow. The remaining portion of the vessel is of small caliber. diminutive LCx with D1 off LAD supplying postero-lateral wall  - Right Coronary Artery:  The proximal RCA has a 70% stenosis. There is ЮЛИЯ 3 flow.  Lesion Details: Eccentric shape, moderate proximal tortuosity, segment angulation of >90 degrees, smooth contour, none to mild calcification. The lesion is not ulcerated. No bifurcation is present.  The mid RCA has a 90% stenosis. There is ЮЛИЯ 3 flow.  Lesion Details: Concentric shape, severe proximal tortuosity, segment angulation of <45 degrees, smooth contour, none to mild calcification. The lesion is not ulcerated. No bifurcation is present.      Assessment:     1. PAF (paroxysmal atrial fibrillation)    2. Moderate mitral insufficiency    3. Ischemic cardiomyopathy    4. Coronary artery disease involving native coronary artery of native heart without angina pectoris    5. SVT (supraventricular tachycardia)    6. Essential hypertension        Plan:     Continue medications including asa 81 mg po daily  Activity as tolerated  Low salt diet  F/u in 6 months.

## 2018-07-30 NOTE — PROGRESS NOTES
Patient, Bright [place] (MRN #[id number]), presented with a recent Estimated PA Systolic Pressure greater than 40 mmHG consistent with the definition of pulmonary hypertension (ICD10 - I27.0).    Est. PA Systolic Pressure   Date Value Ref Range Status   04/11/2016 58.56 (A)       The patient's pulmonary hypertension was monitored, evaluated, addressed and/or treated. This addendum to the medical record is made on 07/30/2018.

## 2018-08-13 LAB — INR PPP: 1.8

## 2018-08-14 ENCOUNTER — ANTI-COAG VISIT (OUTPATIENT)
Dept: CARDIOLOGY | Facility: CLINIC | Age: 78
End: 2018-08-14

## 2018-08-20 LAB — INR PPP: 2.3

## 2018-08-21 ENCOUNTER — ANTI-COAG VISIT (OUTPATIENT)
Dept: CARDIOLOGY | Facility: CLINIC | Age: 78
End: 2018-08-21

## 2018-09-10 LAB — INR PPP: 2.7

## 2018-09-11 ENCOUNTER — ANTI-COAG VISIT (OUTPATIENT)
Dept: CARDIOLOGY | Facility: CLINIC | Age: 78
End: 2018-09-11

## 2018-10-08 LAB — INR PPP: 2.1

## 2018-10-09 ENCOUNTER — ANTI-COAG VISIT (OUTPATIENT)
Dept: CARDIOLOGY | Facility: CLINIC | Age: 78
End: 2018-10-09

## 2018-11-12 LAB — INR PPP: 3.3

## 2018-11-13 ENCOUNTER — ANTI-COAG VISIT (OUTPATIENT)
Dept: CARDIOLOGY | Facility: CLINIC | Age: 78
End: 2018-11-13

## 2018-11-13 NOTE — PROGRESS NOTES
Patient advised to take 4mg 11/13/18 then resume regular dose    Advised follow up 11/26/18     Patient verbalized understanding

## 2018-11-13 NOTE — PROGRESS NOTES
Confirmed correct dose of coumadin   Reports less greens, to resume regular intake    Denies any other changes

## 2018-11-26 LAB — INR PPP: 3.3

## 2018-11-27 ENCOUNTER — ANTI-COAG VISIT (OUTPATIENT)
Dept: CARDIOLOGY | Facility: CLINIC | Age: 78
End: 2018-11-27

## 2018-11-28 NOTE — PROGRESS NOTES
Patient advised on 11/27 to hold warfarin dose    Advised 11/28 to take 8mg daily except 4mg Monday, Wednesday     Advised follow up 12/10/18    Patient verbalized understanding

## 2018-12-10 LAB — INR PPP: 3.2

## 2018-12-11 ENCOUNTER — ANTI-COAG VISIT (OUTPATIENT)
Dept: CARDIOLOGY | Facility: CLINIC | Age: 78
End: 2018-12-11

## 2018-12-26 LAB — INR PPP: 2.3

## 2018-12-27 ENCOUNTER — ANTI-COAG VISIT (OUTPATIENT)
Dept: CARDIOLOGY | Facility: CLINIC | Age: 78
End: 2018-12-27

## 2019-01-09 LAB — INR PPP: 1.8

## 2019-01-10 ENCOUNTER — ANTI-COAG VISIT (OUTPATIENT)
Dept: CARDIOLOGY | Facility: CLINIC | Age: 79
End: 2019-01-10

## 2019-01-16 LAB — INR PPP: 2.5

## 2019-01-17 ENCOUNTER — ANTI-COAG VISIT (OUTPATIENT)
Dept: CARDIOLOGY | Facility: CLINIC | Age: 79
End: 2019-01-17

## 2019-01-17 DIAGNOSIS — Z79.01 LONG TERM (CURRENT) USE OF ANTICOAGULANTS: Primary | ICD-10-CM

## 2019-01-17 NOTE — PROGRESS NOTES
Patient advised to take 8mg daily except 4mg Wednesday   Advised follow up 1/30/19   Patient verbalized understanding

## 2019-02-01 LAB — INR PPP: 2.1

## 2019-02-04 ENCOUNTER — ANTI-COAG VISIT (OUTPATIENT)
Dept: CARDIOLOGY | Facility: CLINIC | Age: 79
End: 2019-02-04

## 2019-02-14 LAB — INR PPP: 2.1

## 2019-02-15 ENCOUNTER — ANTI-COAG VISIT (OUTPATIENT)
Dept: CARDIOLOGY | Facility: CLINIC | Age: 79
End: 2019-02-15

## 2019-02-18 ENCOUNTER — OFFICE VISIT (OUTPATIENT)
Dept: CARDIOLOGY | Facility: CLINIC | Age: 79
End: 2019-02-18
Payer: MEDICARE

## 2019-02-18 VITALS
HEART RATE: 61 BPM | BODY MASS INDEX: 24.09 KG/M2 | SYSTOLIC BLOOD PRESSURE: 117 MMHG | OXYGEN SATURATION: 97 % | DIASTOLIC BLOOD PRESSURE: 57 MMHG | WEIGHT: 182.63 LBS

## 2019-02-18 DIAGNOSIS — I10 ESSENTIAL HYPERTENSION: ICD-10-CM

## 2019-02-18 DIAGNOSIS — I34.0 NON-RHEUMATIC MITRAL REGURGITATION: ICD-10-CM

## 2019-02-18 DIAGNOSIS — I48.0 PAF (PAROXYSMAL ATRIAL FIBRILLATION): Primary | ICD-10-CM

## 2019-02-18 DIAGNOSIS — I25.119 CORONARY ARTERY DISEASE INVOLVING NATIVE CORONARY ARTERY OF NATIVE HEART WITH ANGINA PECTORIS: ICD-10-CM

## 2019-02-18 DIAGNOSIS — I25.5 ISCHEMIC CARDIOMYOPATHY: ICD-10-CM

## 2019-02-18 DIAGNOSIS — I47.10 SVT (SUPRAVENTRICULAR TACHYCARDIA): ICD-10-CM

## 2019-02-18 DIAGNOSIS — I27.20 PULMONARY HTN: ICD-10-CM

## 2019-02-18 DIAGNOSIS — I34.0 MODERATE MITRAL INSUFFICIENCY: ICD-10-CM

## 2019-02-18 PROCEDURE — 1101F PT FALLS ASSESS-DOCD LE1/YR: CPT | Mod: CPTII,S$GLB,, | Performed by: INTERNAL MEDICINE

## 2019-02-18 PROCEDURE — 3074F SYST BP LT 130 MM HG: CPT | Mod: CPTII,S$GLB,, | Performed by: INTERNAL MEDICINE

## 2019-02-18 PROCEDURE — 3078F PR MOST RECENT DIASTOLIC BLOOD PRESSURE < 80 MM HG: ICD-10-PCS | Mod: CPTII,S$GLB,, | Performed by: INTERNAL MEDICINE

## 2019-02-18 PROCEDURE — 1101F PR PT FALLS ASSESS DOC 0-1 FALLS W/OUT INJ PAST YR: ICD-10-PCS | Mod: CPTII,S$GLB,, | Performed by: INTERNAL MEDICINE

## 2019-02-18 PROCEDURE — 99214 OFFICE O/P EST MOD 30 MIN: CPT | Mod: S$GLB,,, | Performed by: INTERNAL MEDICINE

## 2019-02-18 PROCEDURE — 99214 PR OFFICE/OUTPT VISIT, EST, LEVL IV, 30-39 MIN: ICD-10-PCS | Mod: S$GLB,,, | Performed by: INTERNAL MEDICINE

## 2019-02-18 PROCEDURE — 3078F DIAST BP <80 MM HG: CPT | Mod: CPTII,S$GLB,, | Performed by: INTERNAL MEDICINE

## 2019-02-18 PROCEDURE — 3074F PR MOST RECENT SYSTOLIC BLOOD PRESSURE < 130 MM HG: ICD-10-PCS | Mod: CPTII,S$GLB,, | Performed by: INTERNAL MEDICINE

## 2019-02-18 NOTE — PROGRESS NOTES
Subjective:   Patient ID:  Bright Spain is a 78 y.o. male who presents for follow-up of No chief complaint on file.      Problem List Items Addressed This Visit        Pulmonary    Pulmonary HTN       Cardiac/Vascular    Essential hypertension    SVT (supraventricular tachycardia)    Mitral regurgitation    Coronary artery disease involving native coronary artery with angina pectoris    Ischemic cardiomyopathy    PAF (paroxysmal atrial fibrillation) - Primary    Moderate mitral insufficiency          HPI: Patient is here for f/u of severe CAD with ICM. He is doing well with no complaints.  He denies chest pain or SOB. No orthopnea or PND. BP is control. Patient is being treated for severe CAD, CABG was recommended but patient not interested.   He is still very active with no concerns. He is compliant with medications and tolerating well. No bleeding on coumadin. INR therapeutic.     Review of Systems   Constitution: Negative.   HENT: Negative.    Eyes: Negative.    Cardiovascular: Negative.    Respiratory: Negative.    Endocrine: Negative.    Hematologic/Lymphatic: Negative.    Skin: Negative.    Musculoskeletal: Negative.    Gastrointestinal: Negative.    Neurological: Negative.        Patient's Medications   New Prescriptions    No medications on file   Previous Medications    ALLOPURINOL (ZYLOPRIM) 300 MG TABLET    Take 300 mg by mouth once daily.    AMIODARONE (PACERONE) 400 MG TABLET    Take 1 tablet (400 mg total) by mouth once daily.    ASPIRIN (ECOTRIN) 81 MG EC TABLET    Take 81 mg by mouth once daily.    ATORVASTATIN (LIPITOR) 40 MG TABLET    Take 40 mg by mouth once daily.    CARTIA  MG 24 HR CAPSULE        CARVEDILOL (COREG) 12.5 MG TABLET    Take 1 tablet (12.5 mg total) by mouth 2 (two) times daily.    FINASTERIDE (PROSCAR) 5 MG TABLET    Take 5 mg by mouth once daily.    FUROSEMIDE (LASIX) 20 MG TABLET    Take 1 tablet (20 mg total) by mouth once daily.    LOSARTAN (COZAAR) 100 MG TABLET    Take  1 tablet (100 mg total) by mouth once daily.    MULTIVITAMIN WITH MINERALS TABLET    Take 1 tablet by mouth once daily.    NITROGLYCERIN (NITROSTAT) 0.4 MG SL TABLET    Place 1 tablet (0.4 mg total) under the tongue every 5 (five) minutes as needed for Chest pain.    POTASSIUM CHLORIDE SA (K-DUR,KLOR-CON) 20 MEQ TABLET    Take 1 tablet (20 mEq total) by mouth once daily.    TAMSULOSIN (FLOMAX) 0.4 MG CP24    Take 0.4 mg by mouth once daily.    WARFARIN (COUMADIN) 4 MG TABLET    Take 2 & 1/2 tablets daily except 2 tablets on Thursday or as directed by [facility]   Modified Medications    No medications on file   Discontinued Medications    No medications on file       Objective:   Physical Exam   Constitutional: He is oriented to person, place, and time. He appears well-developed and well-nourished. No distress.   Examination of the digits showed no clubbing or cyanosis   HENT:   Head: Normocephalic and atraumatic.   Eyes: Conjunctivae are normal. Pupils are equal, round, and reactive to light. Right eye exhibits no discharge.   Neck: Normal range of motion. Neck supple. No JVD present. No thyromegaly present.   No carotid bruits   Cardiovascular: Normal rate, regular rhythm, S1 normal, S2 normal, normal heart sounds, intact distal pulses and normal pulses. PMI is not displaced. Exam reveals no gallop, no friction rub and no opening snap.   No murmur heard.  Pulmonary/Chest: Effort normal and breath sounds normal. No respiratory distress. He has no wheezes. He has no rales. He exhibits no tenderness.   Abdominal: Soft. Bowel sounds are normal. He exhibits no distension and no mass. There is no tenderness. There is no guarding.   No hepatosplenomegaly   Musculoskeletal: Normal range of motion. He exhibits no edema or tenderness.   Lymphadenopathy:     He has no cervical adenopathy.   Neurological: He is alert and oriented to person, place, and time.   Skin: Skin is warm. No rash noted. He is not diaphoretic. No  erythema.   Psychiatric: He has a normal mood and affect.   Nursing note and vitals reviewed.      ECGs reviewed-NSR with PACs  LABS reviewed  Imaging including Echoes reviewed- ef 25% with moderate to severe MR with PH    Diagnostic:     Patient has a right dominant coronary artery.     - Left Main Coronary Artery:  The LM has luminal irregularities. There is ЮЛИЯ 3 flow.  - Left Anterior Descending Artery:  The proximal LAD has a 80% stenosis. There is ЮЛИЯ 3 flow.  Lesion Details: Eccentric shape, segment angulation of <45 degrees, smooth contour, mild to moderate calcification. The lesion is not ulcerated. No bifurcation is present.  The mid LAD has a 80% stenosis. There is ЮЛИЯ 3 flow.  Lesion Details: Eccentric shape, segment angulation of 45-90 degrees, irregular contour, moderate to heavy calcification. The lesion is not ulcerated. No bifurcation is present.  - D1:  The ostial D1 has a 90% stenosis. There is ЮЛИЯ 3 flow.  Lesion Details: Eccentric shape, segment angulation of 45-90 degrees, irregular contour, moderate to heavy calcification. The lesion is not ulcerated. Bifurcation is present. severe calcific ostial stenosis of large D1 supplying posterolateral region of LV  - Left Circumflex Artery:  The LCX is normal. There is ЮЛИЯ 3 flow. The remaining portion of the vessel is of small caliber. diminutive LCx with D1 off LAD supplying postero-lateral wall  - Right Coronary Artery:  The proximal RCA has a 70% stenosis. There is ЮЛИЯ 3 flow.  Lesion Details: Eccentric shape, moderate proximal tortuosity, segment angulation of >90 degrees, smooth contour, none to mild calcification. The lesion is not ulcerated. No bifurcation is present.  The mid RCA has a 90% stenosis. There is ЮЛИЯ 3 flow.  Lesion Details: Concentric shape, severe proximal tortuosity, segment angulation of <45 degrees, smooth contour, none to mild calcification. The lesion is not ulcerated. No bifurcation is present.     Assessment:     1.  PAF (paroxysmal atrial fibrillation)    2. Moderate mitral insufficiency    3. Ischemic cardiomyopathy    4. SVT (supraventricular tachycardia)    5. Non-rheumatic mitral regurgitation    6. Essential hypertension    7. Coronary artery disease involving native coronary artery of native heart with angina pectoris    8. Pulmonary HTN        Plan:     Continue current medications   Activity as tolerated  Low salt diet  F/u in 6 months.

## 2019-02-28 LAB — INR PPP: 2.9

## 2019-03-01 ENCOUNTER — ANTI-COAG VISIT (OUTPATIENT)
Dept: CARDIOLOGY | Facility: CLINIC | Age: 79
End: 2019-03-01

## 2019-03-21 LAB — INR PPP: 2

## 2019-03-22 ENCOUNTER — ANTI-COAG VISIT (OUTPATIENT)
Dept: CARDIOLOGY | Facility: CLINIC | Age: 79
End: 2019-03-22

## 2019-04-19 LAB — INR PPP: 1.5

## 2019-04-22 ENCOUNTER — ANTI-COAG VISIT (OUTPATIENT)
Dept: CARDIOLOGY | Facility: CLINIC | Age: 79
End: 2019-04-22
Payer: MEDICARE

## 2019-04-22 PROCEDURE — 93793 ANTICOAG MGMT PT WARFARIN: CPT | Mod: S$GLB,,, | Performed by: PHARMACIST

## 2019-04-22 PROCEDURE — 93793 PR ANTICOAGULANT MGMT FOR PT TAKING WARFARIN: ICD-10-PCS | Mod: S$GLB,,, | Performed by: PHARMACIST

## 2019-04-22 NOTE — PROGRESS NOTES
Wife was advised to continue same dose of coumadin and to have patient get lab drawn tomorrow -4/23, wife stated understanding

## 2019-04-22 NOTE — PROGRESS NOTES
Verified correct dose of coumadin but missed 4/13 dose, usual bruising from bumping into things, had greens for 3-4 days prior to lab draw, has had less beer than usual chao,   Of note, above INR is 4 days old.  INR may have been low last week to the missed dose prior to this lab and may be different now. We will need current INR with proper turn around this time.

## 2019-04-24 ENCOUNTER — ANTI-COAG VISIT (OUTPATIENT)
Dept: CARDIOLOGY | Facility: CLINIC | Age: 79
End: 2019-04-24
Payer: MEDICARE

## 2019-04-24 LAB — INR PPP: 2.1

## 2019-04-24 PROCEDURE — 93793 PR ANTICOAGULANT MGMT FOR PT TAKING WARFARIN: ICD-10-PCS | Mod: S$GLB,,,

## 2019-04-24 PROCEDURE — 93793 ANTICOAG MGMT PT WARFARIN: CPT | Mod: S$GLB,,,

## 2019-05-07 LAB — INR PPP: 2.8

## 2019-05-08 ENCOUNTER — ANTI-COAG VISIT (OUTPATIENT)
Dept: CARDIOLOGY | Facility: CLINIC | Age: 79
End: 2019-05-08
Payer: MEDICARE

## 2019-05-08 PROCEDURE — 93793 PR ANTICOAGULANT MGMT FOR PT TAKING WARFARIN: ICD-10-PCS | Mod: S$GLB,,,

## 2019-05-08 PROCEDURE — 93793 ANTICOAG MGMT PT WARFARIN: CPT | Mod: S$GLB,,,

## 2019-05-28 LAB — INR PPP: 3.2

## 2019-05-29 ENCOUNTER — ANTI-COAG VISIT (OUTPATIENT)
Dept: CARDIOLOGY | Facility: CLINIC | Age: 79
End: 2019-05-29
Payer: MEDICARE

## 2019-05-29 PROCEDURE — 93793 PR ANTICOAGULANT MGMT FOR PT TAKING WARFARIN: ICD-10-PCS | Mod: S$GLB,,,

## 2019-05-29 PROCEDURE — 93793 ANTICOAG MGMT PT WARFARIN: CPT | Mod: S$GLB,,,

## 2019-05-29 NOTE — PROGRESS NOTES
INR not at goal. Medications, chart, and patient findings reviewed. See calendar for adjustments to dose and follow up plan.  Pt findings:  Pt reports diarrhea x2 days last week; he did not have greens; and denies any other changes.  Will instruct pt to take 4mg 5/30 & resume maintenance regimen.  Plan to re-assess in 3 weeks.  Pt is to resume normal diet as well.

## 2019-06-19 LAB — INR PPP: 2.9

## 2019-06-20 ENCOUNTER — ANTI-COAG VISIT (OUTPATIENT)
Dept: CARDIOLOGY | Facility: CLINIC | Age: 79
End: 2019-06-20
Payer: MEDICARE

## 2019-06-20 PROCEDURE — 93793 PR ANTICOAGULANT MGMT FOR PT TAKING WARFARIN: ICD-10-PCS | Mod: S$GLB,,,

## 2019-06-20 PROCEDURE — 93793 ANTICOAG MGMT PT WARFARIN: CPT | Mod: S$GLB,,,

## 2019-07-11 ENCOUNTER — TELEPHONE (OUTPATIENT)
Dept: CARDIOLOGY | Facility: CLINIC | Age: 79
End: 2019-07-11

## 2019-07-11 NOTE — TELEPHONE ENCOUNTER
----- Message from Silvia Mcdonald sent at 7/11/2019  8:59 AM CDT -----  No. [phone]    Patient spoke to someone in the office regarding bills he is receiving.   Please call.

## 2019-07-11 NOTE — TELEPHONE ENCOUNTER
Called and spoke with pt and he stated that he is getting a bill from having his coumadin levels checked . Gave pt the coumadin clinic number [phone]

## 2019-08-09 ENCOUNTER — TELEPHONE (OUTPATIENT)
Dept: CARDIOLOGY | Facility: CLINIC | Age: 79
End: 2019-08-09

## 2019-08-09 NOTE — TELEPHONE ENCOUNTER
----- Message from Akil Romano sent at 8/9/2019 11:07 AM CDT -----  Contact: pt walked in the [facility] office  Mr. Spain came in the office this morning to inform Dr. Stuart that Dr. Mckeon will be following his Coumadin from now on.

## 2019-09-16 ENCOUNTER — OFFICE VISIT (OUTPATIENT)
Dept: CARDIOLOGY | Facility: CLINIC | Age: 79
End: 2019-09-16
Payer: MEDICARE

## 2019-09-16 VITALS
HEART RATE: 76 BPM | SYSTOLIC BLOOD PRESSURE: 130 MMHG | BODY MASS INDEX: 28.56 KG/M2 | WEIGHT: 182 LBS | DIASTOLIC BLOOD PRESSURE: 76 MMHG | HEIGHT: 67 IN

## 2019-09-16 DIAGNOSIS — I25.10 CORONARY ARTERY DISEASE INVOLVING NATIVE CORONARY ARTERY OF NATIVE HEART WITHOUT ANGINA PECTORIS: ICD-10-CM

## 2019-09-16 DIAGNOSIS — I34.0 MODERATE MITRAL INSUFFICIENCY: ICD-10-CM

## 2019-09-16 DIAGNOSIS — I25.5 ISCHEMIC CARDIOMYOPATHY: ICD-10-CM

## 2019-09-16 DIAGNOSIS — I47.10 SVT (SUPRAVENTRICULAR TACHYCARDIA): ICD-10-CM

## 2019-09-16 DIAGNOSIS — I48.0 PAF (PAROXYSMAL ATRIAL FIBRILLATION): Primary | ICD-10-CM

## 2019-09-16 DIAGNOSIS — I10 ESSENTIAL HYPERTENSION: ICD-10-CM

## 2019-09-16 PROCEDURE — 3075F PR MOST RECENT SYSTOLIC BLOOD PRESS GE 130-139MM HG: ICD-10-PCS | Mod: CPTII,S$GLB,, | Performed by: INTERNAL MEDICINE

## 2019-09-16 PROCEDURE — 3078F PR MOST RECENT DIASTOLIC BLOOD PRESSURE < 80 MM HG: ICD-10-PCS | Mod: CPTII,S$GLB,, | Performed by: INTERNAL MEDICINE

## 2019-09-16 PROCEDURE — 99214 OFFICE O/P EST MOD 30 MIN: CPT | Mod: S$GLB,,, | Performed by: INTERNAL MEDICINE

## 2019-09-16 PROCEDURE — 99999 PR PBB SHADOW E&M-EST. PATIENT-LVL III: CPT | Mod: PBBFAC,,, | Performed by: INTERNAL MEDICINE

## 2019-09-16 PROCEDURE — 3075F SYST BP GE 130 - 139MM HG: CPT | Mod: CPTII,S$GLB,, | Performed by: INTERNAL MEDICINE

## 2019-09-16 PROCEDURE — 3078F DIAST BP <80 MM HG: CPT | Mod: CPTII,S$GLB,, | Performed by: INTERNAL MEDICINE

## 2019-09-16 PROCEDURE — 99999 PR PBB SHADOW E&M-EST. PATIENT-LVL III: ICD-10-PCS | Mod: PBBFAC,,, | Performed by: INTERNAL MEDICINE

## 2019-09-16 PROCEDURE — 99214 PR OFFICE/OUTPT VISIT, EST, LEVL IV, 30-39 MIN: ICD-10-PCS | Mod: S$GLB,,, | Performed by: INTERNAL MEDICINE

## 2019-09-16 NOTE — PROGRESS NOTES
Subjective:   Patient ID:  Bright Spain is a 79 y.o. male who presents for follow-up of No chief complaint on file.      Problem List Items Addressed This Visit        Cardiac/Vascular    Essential hypertension    SVT (supraventricular tachycardia)    Coronary artery disease involving native coronary artery of native heart without angina pectoris    Ischemic cardiomyopathy    PAF (paroxysmal atrial fibrillation) - Primary    Moderate mitral insufficiency          HPI: Patient is here for f/u of severe CAD with ICM, He is known to have severe CAD and decline CABG so is being treated medically and doing well. He has assoicted ICM.  He is doing well with no complaints. He denies chest pain or SOB. No orthopnea or PND. BP is control. He is still very active cutting his grass an dworking in his yard with no concerns. He is compliant with medications and tolerating well. No bleeding on coumadin. INR therapeutic.     Review of Systems   Constitution: Negative.   HENT: Negative.    Eyes: Negative.    Cardiovascular: Negative.    Respiratory: Negative.    Endocrine: Negative.    Hematologic/Lymphatic: Negative.    Skin: Negative.    Musculoskeletal: Negative.    Gastrointestinal: Negative.    Neurological: Negative.        Patient's Medications   New Prescriptions    No medications on file   Previous Medications    ALLOPURINOL (ZYLOPRIM) 300 MG TABLET    Take 300 mg by mouth once daily.    AMIODARONE (PACERONE) 400 MG TABLET    Take 1 tablet (400 mg total) by mouth once daily.    ASPIRIN (ECOTRIN) 81 MG EC TABLET    Take 81 mg by mouth once daily.    ATORVASTATIN (LIPITOR) 40 MG TABLET    Take 40 mg by mouth once daily.    CARTIA  MG 24 HR CAPSULE        CARVEDILOL (COREG) 12.5 MG TABLET    Take 1 tablet (12.5 mg total) by mouth 2 (two) times daily.    FINASTERIDE (PROSCAR) 5 MG TABLET    Take 5 mg by mouth once daily.    FUROSEMIDE (LASIX) 20 MG TABLET    Take 1 tablet (20 mg total) by mouth once daily.    LOSARTAN  (COZAAR) 100 MG TABLET    Take 1 tablet (100 mg total) by mouth once daily.    MULTIVITAMIN WITH MINERALS TABLET    Take 1 tablet by mouth once daily.    NITROGLYCERIN (NITROSTAT) 0.4 MG SL TABLET    Place 1 tablet (0.4 mg total) under the tongue every 5 (five) minutes as needed for Chest pain.    POTASSIUM CHLORIDE SA (K-DUR,KLOR-CON) 20 MEQ TABLET    Take 1 tablet (20 mEq total) by mouth once daily.    TAMSULOSIN (FLOMAX) 0.4 MG CP24    Take 0.4 mg by mouth once daily.    WARFARIN (COUMADIN) 4 MG TABLET    Take 2 & 1/2 tablets daily except 2 tablets on Thursday or as directed by Coumadin Clinic   Modified Medications    No medications on file   Discontinued Medications    No medications on file       Objective:   Physical Exam   Constitutional: He is oriented to person, place, and time. He appears well-developed and well-nourished. No distress.   Examination of the digits showed no clubbing or cyanosis   HENT:   Head: Normocephalic and atraumatic.   Eyes: Pupils are equal, round, and reactive to light. Conjunctivae are normal. Right eye exhibits no discharge.   Neck: Normal range of motion. Neck supple. No JVD present. No thyromegaly present.   No carotid bruits   Cardiovascular: Normal rate, regular rhythm, S1 normal, S2 normal, normal heart sounds, intact distal pulses and normal pulses. PMI is not displaced. Exam reveals no gallop, no friction rub and no opening snap.   No murmur heard.  Pulmonary/Chest: Effort normal and breath sounds normal. No respiratory distress. He has no wheezes. He has no rales. He exhibits no tenderness.   Abdominal: Soft. Bowel sounds are normal. He exhibits no distension and no mass. There is no tenderness. There is no guarding.   No hepatosplenomegaly   Musculoskeletal: Normal range of motion. He exhibits no edema or tenderness.   Lymphadenopathy:     He has no cervical adenopathy.   Neurological: He is alert and oriented to person, place, and time.   Skin: Skin is warm. No rash  noted. He is not diaphoretic. No erythema.   Psychiatric: He has a normal mood and affect.   Nursing note and vitals reviewed.      ECGs reviewed-NSR with PACs  LABS reviewed  Imaging including Echoes reviewed- ef 25% with moderate to severe MR with PH    Diagnostic:     Patient has a right dominant coronary artery.     - Left Main Coronary Artery:  The LM has luminal irregularities. There is ЮЛИЯ 3 flow.  - Left Anterior Descending Artery:  The proximal LAD has a 80% stenosis. There is ЮЛИЯ 3 flow.  Lesion Details: Eccentric shape, segment angulation of <45 degrees, smooth contour, mild to moderate calcification. The lesion is not ulcerated. No bifurcation is present.  The mid LAD has a 80% stenosis. There is ЮЛИЯ 3 flow.  Lesion Details: Eccentric shape, segment angulation of 45-90 degrees, irregular contour, moderate to heavy calcification. The lesion is not ulcerated. No bifurcation is present.  - D1:  The ostial D1 has a 90% stenosis. There is ЮЛИЯ 3 flow.  Lesion Details: Eccentric shape, segment angulation of 45-90 degrees, irregular contour, moderate to heavy calcification. The lesion is not ulcerated. Bifurcation is present. severe calcific ostial stenosis of large D1 supplying posterolateral region of LV  - Left Circumflex Artery:  The LCX is normal. There is ЮЛИЯ 3 flow. The remaining portion of the vessel is of small caliber. diminutive LCx with D1 off LAD supplying postero-lateral wall  - Right Coronary Artery:  The proximal RCA has a 70% stenosis. There is ЮЛИЯ 3 flow.  Lesion Details: Eccentric shape, moderate proximal tortuosity, segment angulation of >90 degrees, smooth contour, none to mild calcification. The lesion is not ulcerated. No bifurcation is present.  The mid RCA has a 90% stenosis. There is ЮЛИЯ 3 flow.  Lesion Details: Concentric shape, severe proximal tortuosity, segment angulation of <45 degrees, smooth contour, none to mild calcification. The lesion is not ulcerated. No bifurcation  is present.     Assessment:     1. PAF (paroxysmal atrial fibrillation)    2. Moderate mitral insufficiency    3. Ischemic cardiomyopathy    4. Essential hypertension    5. Coronary artery disease involving native coronary artery of native heart without angina pectoris    6. SVT (supraventricular tachycardia)        Plan:     Continue current medications   Activity as tolerated  Low salt diet  F/u in 6 months.

## 2020-02-07 ENCOUNTER — TELEPHONE (OUTPATIENT)
Dept: CARDIOLOGY | Facility: CLINIC | Age: 80
End: 2020-02-07

## 2020-02-07 DIAGNOSIS — I25.5 ISCHEMIC CARDIOMYOPATHY: ICD-10-CM

## 2020-02-07 DIAGNOSIS — I48.0 PAROXYSMAL ATRIAL FIBRILLATION: Primary | ICD-10-CM

## 2020-02-07 DIAGNOSIS — I08.0 MITRAL VALVE INSUFFICIENCY AND AORTIC VALVE INSUFFICIENCY: ICD-10-CM

## 2020-02-14 ENCOUNTER — TELEPHONE (OUTPATIENT)
Dept: CARDIOLOGY | Facility: CLINIC | Age: 80
End: 2020-02-14

## 2020-02-14 NOTE — TELEPHONE ENCOUNTER
I spoke to Mrs. Spain,    She stated they (Patient &  ) were inquiring if they had orders in  Yet to scheduled  Test needed prior to seeing Adeline Julien.    Yes!  Orders have been requested , check back on Tuesday and  Call and schedule  At [phone].      Sincerely --    Radha La (rita).                            ----- Message from Svetlana Zimmer sent at 2/14/2020  8:33 AM CST -----  Contact: [phone]/self  Patient requesting to speak with you regarding picking up paperwork for surgery. Please advise.

## 2020-02-27 ENCOUNTER — HOSPITAL ENCOUNTER (OUTPATIENT)
Dept: CARDIOLOGY | Facility: HOSPITAL | Age: 80
Discharge: HOME | End: 2020-02-27
Attending: INTERNAL MEDICINE
Payer: MEDICARE

## 2020-02-27 DIAGNOSIS — I08.0 MITRAL VALVE INSUFFICIENCY AND AORTIC VALVE INSUFFICIENCY: ICD-10-CM

## 2020-02-27 DIAGNOSIS — I48.0 PAROXYSMAL ATRIAL FIBRILLATION: ICD-10-CM

## 2020-02-27 LAB
AORTIC ROOT ANNULUS: 2.92 CM
AORTIC VALVE CUSP SEPERATION: 1.46 CM
AV INDEX (PROSTH): 0.58
AV MEAN GRADIENT: 6 MMHG
AV PEAK GRADIENT: 10 MMHG
AV VALVE AREA: 2.13 CM2
AV VELOCITY RATIO: 0.59
CV ECHO LV RWT: 0.54 CM
DOP CALC AO PEAK VEL: 1.61 M/S
DOP CALC AO VTI: 37.34 CM
DOP CALC LVOT AREA: 3.7 CM2
DOP CALC LVOT DIAMETER: 2.17 CM
DOP CALC LVOT PEAK VEL: 0.95 M/S
DOP CALC LVOT STROKE VOLUME: 79.55 CM3
DOP CALCLVOT PEAK VEL VTI: 21.52 CM
E WAVE DECELERATION TIME: 223.71 MSEC
E/A RATIO: 0.99
E/E' RATIO: 11.57 M/S
ECHO LV POSTERIOR WALL: 1.23 CM (ref 0.6–1.1)
FRACTIONAL SHORTENING: 20 % (ref 28–44)
INTERVENTRICULAR SEPTUM: 1.24 CM (ref 0.6–1.1)
IVC PROX: 1.2 CM
LA MAJOR: 5.51 CM
LA MINOR: 6.12 CM
LA WIDTH: 4.7 CM
LEFT ATRIUM SIZE: 3.44 CM
LEFT ATRIUM VOLUME: 79.69 CM3
LEFT INTERNAL DIMENSION IN SYSTOLE: 3.65 CM (ref 2.1–4)
LEFT VENTRICLE DIASTOLIC VOLUME: 96.17 ML
LEFT VENTRICLE SYSTOLIC VOLUME: 56.25 ML
LEFT VENTRICULAR INTERNAL DIMENSION IN DIASTOLE: 4.58 CM (ref 3.5–6)
LEFT VENTRICULAR MASS: 212.2 G
LV LATERAL E/E' RATIO: 9 M/S
LV SEPTAL E/E' RATIO: 16.2 M/S
MV A" WAVE DURATION": 125 MSEC
MV PEAK A VEL: 0.82 M/S
MV PEAK E VEL: 0.81 M/S
PISA TR MAX VEL: 2.61 M/S
PULM VEIN A" WAVE DURATION": 128 MSEC
PULM VEIN S/D RATIO: 1.19
PV PEAK D VEL: 0.48 M/S
PV PEAK S VEL: 0.57 M/S
PV PEAK VELOCITY: 0.68 CM/S
RA MAJOR: 5.64 CM
RA PRESSURE: 3 MMHG
RA WIDTH: 3.48 CM
RIGHT VENTRICULAR END-DIASTOLIC DIMENSION: 2.73 CM
SINUS: 2.66 CM
TDI LATERAL: 0.09 M/S
TDI SEPTAL: 0.05 M/S
TDI: 0.07 M/S
TR MAX PG: 27 MMHG
TRICUSPID ANNULAR PLANE SYSTOLIC EXCURSION: 2.1 CM
TV REST PULMONARY ARTERY PRESSURE: 30 MMHG

## 2020-02-27 PROCEDURE — 93306 TTE W/DOPPLER COMPLETE: CPT | Mod: 26,,, | Performed by: STUDENT IN AN ORGANIZED HEALTH CARE EDUCATION/TRAINING PROGRAM

## 2020-02-27 PROCEDURE — 93306 ECHO (CUPID ONLY): ICD-10-PCS | Mod: 26,,, | Performed by: STUDENT IN AN ORGANIZED HEALTH CARE EDUCATION/TRAINING PROGRAM

## 2020-02-27 PROCEDURE — 93306 TTE W/DOPPLER COMPLETE: CPT | Mod: PO

## 2020-03-11 ENCOUNTER — OFFICE VISIT (OUTPATIENT)
Dept: CARDIOLOGY | Facility: CLINIC | Age: 80
End: 2020-03-11
Payer: MEDICARE

## 2020-03-11 VITALS
DIASTOLIC BLOOD PRESSURE: 79 MMHG | HEIGHT: 72 IN | SYSTOLIC BLOOD PRESSURE: 129 MMHG | WEIGHT: 179 LBS | BODY MASS INDEX: 24.24 KG/M2 | HEART RATE: 68 BPM

## 2020-03-11 DIAGNOSIS — Z79.01 LONG TERM CURRENT USE OF ANTICOAGULANT THERAPY: ICD-10-CM

## 2020-03-11 DIAGNOSIS — Z01.810 PRE-OPERATIVE CARDIOVASCULAR EXAMINATION: ICD-10-CM

## 2020-03-11 DIAGNOSIS — I42.9 CARDIOMYOPATHY, UNSPECIFIED TYPE: ICD-10-CM

## 2020-03-11 DIAGNOSIS — I10 ESSENTIAL HYPERTENSION: ICD-10-CM

## 2020-03-11 DIAGNOSIS — I25.119 CORONARY ARTERY DISEASE INVOLVING NATIVE CORONARY ARTERY OF NATIVE HEART WITH ANGINA PECTORIS: Primary | ICD-10-CM

## 2020-03-11 DIAGNOSIS — E78.2 MIXED HYPERLIPIDEMIA: ICD-10-CM

## 2020-03-11 DIAGNOSIS — I48.0 PAROXYSMAL ATRIAL FIBRILLATION: ICD-10-CM

## 2020-03-11 DIAGNOSIS — I34.0 NONRHEUMATIC MITRAL VALVE REGURGITATION: ICD-10-CM

## 2020-03-11 DIAGNOSIS — I47.10 SVT (SUPRAVENTRICULAR TACHYCARDIA): ICD-10-CM

## 2020-03-11 PROCEDURE — 93000 ELECTROCARDIOGRAM COMPLETE: CPT | Mod: S$GLB,,, | Performed by: INTERNAL MEDICINE

## 2020-03-11 PROCEDURE — 1159F PR MEDICATION LIST DOCUMENTED IN MEDICAL RECORD: ICD-10-PCS | Mod: S$GLB,,, | Performed by: INTERNAL MEDICINE

## 2020-03-11 PROCEDURE — 99999 PR PBB SHADOW E&M-EST. PATIENT-LVL III: ICD-10-PCS | Mod: PBBFAC,,, | Performed by: INTERNAL MEDICINE

## 2020-03-11 PROCEDURE — 3074F PR MOST RECENT SYSTOLIC BLOOD PRESSURE < 130 MM HG: ICD-10-PCS | Mod: CPTII,S$GLB,, | Performed by: INTERNAL MEDICINE

## 2020-03-11 PROCEDURE — 99202 PR OFFICE/OUTPT VISIT, NEW, LEVL II, 15-29 MIN: ICD-10-PCS | Mod: S$GLB,,, | Performed by: INTERNAL MEDICINE

## 2020-03-11 PROCEDURE — 1101F PR PT FALLS ASSESS DOC 0-1 FALLS W/OUT INJ PAST YR: ICD-10-PCS | Mod: CPTII,S$GLB,, | Performed by: INTERNAL MEDICINE

## 2020-03-11 PROCEDURE — 99202 OFFICE O/P NEW SF 15 MIN: CPT | Mod: S$GLB,,, | Performed by: INTERNAL MEDICINE

## 2020-03-11 PROCEDURE — 99999 PR PBB SHADOW E&M-EST. PATIENT-LVL III: CPT | Mod: PBBFAC,,, | Performed by: INTERNAL MEDICINE

## 2020-03-11 PROCEDURE — 3078F PR MOST RECENT DIASTOLIC BLOOD PRESSURE < 80 MM HG: ICD-10-PCS | Mod: CPTII,S$GLB,, | Performed by: INTERNAL MEDICINE

## 2020-03-11 PROCEDURE — 1101F PT FALLS ASSESS-DOCD LE1/YR: CPT | Mod: CPTII,S$GLB,, | Performed by: INTERNAL MEDICINE

## 2020-03-11 PROCEDURE — 3078F DIAST BP <80 MM HG: CPT | Mod: CPTII,S$GLB,, | Performed by: INTERNAL MEDICINE

## 2020-03-11 PROCEDURE — 1159F MED LIST DOCD IN RCRD: CPT | Mod: S$GLB,,, | Performed by: INTERNAL MEDICINE

## 2020-03-11 PROCEDURE — 93000 EKG 12-LEAD: ICD-10-PCS | Mod: S$GLB,,, | Performed by: INTERNAL MEDICINE

## 2020-03-11 PROCEDURE — 3074F SYST BP LT 130 MM HG: CPT | Mod: CPTII,S$GLB,, | Performed by: INTERNAL MEDICINE

## 2020-03-11 NOTE — PROGRESS NOTES
Subjective:   Patient ID:  Bright Spain is a 79 y.o. male who presents for follow up of Atrial Fibrillation; Hyperlipidemia; Hypertension; and Coronary Artery Disease      HPI:       Bright Spain 79 y.o. male is here follow up and feeling well without any new complaints. He has severe CAD with an EF 50%. He decline CABG so is being treated medically and doing well.      Echo 2020     EF 50%   Grade I DD   Mild-moderate MR   Mild TR   Mild LAE    PASP 30 mmHg      ECG 3/11/2020: NSR with LAE           Patient Active Problem List    Diagnosis Date Noted    Moderate mitral insufficiency 2017    Pulmonary HTN 2017    Ischemic cardiomyopathy 2017    PAF (paroxysmal atrial fibrillation) 2017    HX: long term anticoagulant use 2016    Abnormal chest CT 2016    Coronary artery disease involving native coronary artery of native heart without angina pectoris     Pre-operative cardiovascular examination     Mitral regurgitation 2016    Coronary artery disease involving native coronary artery with angina pectoris 2016    SVT (supraventricular tachycardia) 2016    Abnormal cardiovascular stress test 2016     Large defect in anterior wall 3/2016      Cardiomyopathy 2016     EF 20-25%      With severe MR (new)-not seen 2016 and 3/2016      Essential hypertension 2016    Tobacco use disorder, mild, in sustained remission 2016     Quit smoking 40 years ago      Dyspnea 2016    Discomfort in chest 2016    Atrial fibrillation 2016    Long-term (current) use of anticoagulants 2016     Dx updated per  IMO Load             Right Arm BP - Sittin/76  Left Arm BP - Sittin/79        LABS      LAST HbA1c  Lab Results   Component Value Date    HGBA1C 5.7 (H) 2017       Lipid panel  Lab Results   Component Value Date    CHOL 168 2017    CHOL 150 2017    CHOL 118 (L) 2006     Lab  Results   Component Value Date    HDL 40 11/17/2017    HDL 33 (L) 02/16/2017    HDL 29.0 (L) 09/01/2006     Lab Results   Component Value Date    LDLCALC 104.4 11/17/2017    LDLCALC 90.6 02/16/2017    LDLCALC 62.0 (L) 09/01/2006     Lab Results   Component Value Date    TRIG 118 11/17/2017    TRIG 132 02/16/2017    TRIG 135 09/01/2006     Lab Results   Component Value Date    CHOLHDL 23.8 11/17/2017    CHOLHDL 22.0 02/16/2017    CHOLHDL 24.6 09/01/2006            Review of Systems   Constitution: Negative for diaphoresis, night sweats, weight gain and weight loss.   HENT: Negative for congestion.    Eyes: Negative for blurred vision, discharge and double vision.   Cardiovascular: Negative for chest pain, claudication, cyanosis, dyspnea on exertion, irregular heartbeat, leg swelling, near-syncope, orthopnea, palpitations, paroxysmal nocturnal dyspnea and syncope.   Respiratory: Negative for cough, shortness of breath and wheezing.    Endocrine: Negative for cold intolerance, heat intolerance and polyphagia.   Hematologic/Lymphatic: Negative for adenopathy and bleeding problem. Does not bruise/bleed easily.   Skin: Negative for dry skin and nail changes.   Musculoskeletal: Negative for arthritis, back pain, falls, joint pain, myalgias and neck pain.   Gastrointestinal: Negative for bloating, abdominal pain, change in bowel habit and constipation.   Genitourinary: Negative for bladder incontinence, dysuria, flank pain, genital sores and missed menses.   Neurological: Negative for aphonia, brief paralysis, difficulty with concentration, dizziness and weakness.   Psychiatric/Behavioral: Negative for altered mental status and memory loss. The patient does not have insomnia.    Allergic/Immunologic: Negative for environmental allergies.       Objective:   Physical Exam   Constitutional: He is oriented to person, place, and time. He appears well-developed and well-nourished. He is not intubated.   HENT:   Head:  Normocephalic and atraumatic.   Right Ear: External ear normal.   Left Ear: External ear normal.   Mouth/Throat: Oropharynx is clear and moist.   Eyes: Pupils are equal, round, and reactive to light. Conjunctivae and EOM are normal. Right eye exhibits no discharge. Left eye exhibits no discharge. No scleral icterus.   Neck: Normal range of motion. Neck supple. Normal carotid pulses, no hepatojugular reflux and no JVD present. Carotid bruit is not present. No tracheal deviation present. No thyromegaly present.   Cardiovascular: Normal rate, regular rhythm, S1 normal and S2 normal.  No extrasystoles are present. PMI is not displaced. Exam reveals no gallop, no S3, no distant heart sounds, no friction rub and no midsystolic click.   No murmur heard.  Pulses:       Carotid pulses are 2+ on the right side, and 2+ on the left side.       Radial pulses are 2+ on the right side, and 2+ on the left side.        Femoral pulses are 2+ on the right side, and 2+ on the left side.       Popliteal pulses are 2+ on the right side, and 2+ on the left side.        Dorsalis pedis pulses are 2+ on the right side, and 2+ on the left side.        Posterior tibial pulses are 2+ on the right side, and 2+ on the left side.   Pulmonary/Chest: Effort normal and breath sounds normal. No accessory muscle usage or stridor. No apnea, no tachypnea and no bradypnea. He is not intubated. No respiratory distress. He has no decreased breath sounds. He has no wheezes. He has no rales. He exhibits no tenderness and no bony tenderness.   Abdominal: He exhibits no distension, no pulsatile liver, no abdominal bruit, no ascites, no pulsatile midline mass and no mass. There is no tenderness. There is no rebound and no guarding.   Musculoskeletal: Normal range of motion. He exhibits no edema or tenderness.   Lymphadenopathy:     He has no cervical adenopathy.   Neurological: He is alert and oriented to person, place, and time. He has normal reflexes. No  cranial nerve deficit. Coordination normal.   Skin: Skin is warm. No rash noted. No erythema. No pallor.   Psychiatric: He has a normal mood and affect. His behavior is normal. Judgment and thought content normal.       Assessment:     1. Coronary artery disease involving native coronary artery of native heart with angina pectoris    2. Essential hypertension    3. Paroxysmal atrial fibrillation    4. Cardiomyopathy, unspecified type    5. SVT (supraventricular tachycardia)    6. Nonrheumatic mitral valve regurgitation    7. Pre-operative cardiovascular examination    8. Long-term (current) use of anticoagulants    9. Mixed hyperlipidemia        Plan:         Continue with current medical plan and lifestyle changes.  Return sooner for concerns or questions. If symptoms persist go to the ED  I have reviewed all pertinent data on this patient       I have reviewed the patient's medical history in detail and updated the computerized patient record.    Orders Placed This Encounter   Procedures    Lipid panel     Standing Status:   Future     Standing Expiration Date:   5/10/2021    Comprehensive metabolic panel     Standing Status:   Future     Standing Expiration Date:   5/10/2021    EKG 12-lead     Order Specific Question:   Diagnosis     Answer:   CAD (coronary artery disease) [340227]    Echo Color Flow Doppler? Yes     Standing Status:   Future     Standing Expiration Date:   3/11/2021     Order Specific Question:   Color Flow Doppler?     Answer:   Yes       Follow up as scheduled. Return sooner for concerns or questions            He expressed verbal understanding and agreed with the plan        Patient's Medications   New Prescriptions    No medications on file   Previous Medications    ALLOPURINOL (ZYLOPRIM) 300 MG TABLET    Take 300 mg by mouth once daily.    ASPIRIN (ECOTRIN) 81 MG EC TABLET    Take 81 mg by mouth once daily.    ATORVASTATIN (LIPITOR) 40 MG TABLET    Take 40 mg by mouth once daily.     FINASTERIDE (PROSCAR) 5 MG TABLET    Take 5 mg by mouth once daily.    FUROSEMIDE (LASIX) 20 MG TABLET    Take 1 tablet (20 mg total) by mouth once daily.    LOSARTAN (COZAAR) 100 MG TABLET    Take 1 tablet (100 mg total) by mouth once daily.    MULTIVITAMIN WITH MINERALS TABLET    Take 1 tablet by mouth once daily.    NITROGLYCERIN (NITROSTAT) 0.4 MG SL TABLET    Place 1 tablet (0.4 mg total) under the tongue every 5 (five) minutes as needed for Chest pain.    POTASSIUM CHLORIDE SA (K-DUR,KLOR-CON) 20 MEQ TABLET    Take 1 tablet (20 mEq total) by mouth once daily.    TAMSULOSIN (FLOMAX) 0.4 MG CP24    Take 0.4 mg by mouth once daily.    WARFARIN (COUMADIN) 4 MG TABLET    Take 2 & 1/2 tablets daily except 2 tablets on Thursday or as directed by [facility]   Modified Medications    No medications on file   Discontinued Medications

## 2020-03-11 NOTE — PATIENT INSTRUCTIONS
Heart Disease Education    The heart beats 60 to 100 times per minute, 24 hours a day. This equals almost 1000,000 times a day. It pumps blood with oxygen and nutrients to the tissues and organs of the body. But the heart is a muscle and needs its own supply of blood. Blood flow to the heart is supplied by the coronary arteries. Coronary artery disease (atherosclerosis) is a result of cholesterol, saturated fat, and calcium deposits (plaques) that build up inside the walls. This causes inflammation within the coronary arteries. These plaques narrow the artery and reduce blood flow to the heart muscle. The reduction in blood flow to the heart muscle decreases oxygen supply to the heart. If the narrowing is significant enough, the oxygen supply to one or more regions of the heart can be temporarily or permanently shut down. This can cause chest pain, and possibly death of heart tissue (heart attack).  Types of chest pain  Angina is the name for pain in the heart muscle. Angina is a warning sign of serious heart disease. When untreated it can lead to a heart attack, also known as acute myocardial infarction, or AMI. Angina occurs when there is not enough blood and oxygen flowing to the heart for the amount of work it is doing. This most often happens during physical exertion, when the heart is working hardest. It is usually relieved by rest or nitroglycerin. Angina may also occur after a large meal when extra blood is sent to the digestive organs and less goes to the heart. In the case of advanced or unstable heart disease, angina can occur at rest or awaken you from sleep. Angina usually lasts from a few minutes up to 20 minutes or more. When treated early, the effects of angina can be reversed without permanent damage to the heart. Angina is a serious condition and needs to be evaluated by a medical professional immediately.  There are two types of angina -- stable and unstable:  · Stable angina usually occurs  with a predictable level of activity. Being stable, its character, severity, and occurrence do not change much over time. It usually starts with activity, and resolves with rest or taking your medicine as instructed by your doctor. The symptoms usually do not last long.  · Unstable angina changes or gets worse over time. It is different from whatever you are used to. It may feel different or worse, begin without cause, occur with exercise or exertion, wake you up from sleep, and last longer. It may not respond in the same way as it does when you take your usual medicines for an attack. This type of angina can be a warning sign of an impending heart attack.     A heart attack is usually the result of a blood clot that suddenly forms in a coronary artery that has been narrowed with plaque. When this occurs, blood flow may be cut off to a part of the heart muscle, causing the cells to die. This weakens the pumping action of the heart, which affects the delivery of blood to all the other organs in the body including the brain. This damage is not reversible. However, early treatment can limit the amount of damage.  The pain you feel with angina and a heart attack may have a similar quality. However, it is usually different in intensity and duration. Here are some typical descriptions of a heart attack:  · It is most often experienced as a squeezing, crushing, pressure-like sensation in the center of the chest.  · It is sometimes described as something heavy sitting on my chest.  · It may feel more like a bad case of indigestion.  · The pain may spread from the chest to the arm, shoulder, throat or jaw.  · Sometimes the pain is not felt in the chest at all, but only in the arm, shoulder, throat or jaw.  · There may also be nausea, vomiting, dizziness or light-headedness, sweating and trouble breathing.  · Palpitations, or your heart beating rapidly  · A new, irregular heart beat  · Unexplained weakness  You may not be  "able to tell the difference between "bad" angina and a heart attack at home. Seek help if your symptoms are different than usual. Do not be in denial or just try to "tough it out."  Call 911  This is the fastest and safest way to get to the emergency department. The paramedics can also start treatment on the way to the hospital, saving valuable time for your heart.  · If the angina gets worse, if it continues, or if it stops and returns, call 911 immediately. Do not delay. You may be having a heart attack.  · After you call 911, take a second tablet or spray unless instructed otherwise. When repeating doses, sit down if possible, because it can make you feel lightheaded or dizzy. Wait another 5 minutes. If the angina still does not go away, take a third tablet or spray. Do not take more than 3 tablets or sprays within 15 minutes. Stay on the phone with 911 for further instruction.  · Your healthcare provider may give you slightly different instructions than those above. If so, follow them carefully.  Do not wait until symptoms become severe to call 911.  Other reasons to call 911 include:  · Trouble breathing  · Feeling lightheaded, faint, or dizzy  · Rapid heart beat  · Slower than usual heart rate compared to your normal  · Angina with weakness, dizziness, fainting, heavy sweating, nausea, or vomiting  · Extreme drowsiness, confusion  · Weakness of an arm or leg or one side of the face  · Difficulty with speech or vision  When to seek medical care  Remember, the signs and symptoms of a heart attack are not always like they are on TV. Sometimes they are not so obvious. You may only feel weak, or just not right. If it is not clear or if you have any doubt, call for advice.  · Seek help if there is a change in the type of pain, if it feels different, or if your symptoms are mild.  · Do not drive yourself. Have someone else drive you. If no one can drive, call 911.  · Do not delay. Fast diagnosis and treatment can " "prevent or limit the amount of heart damage during a heart attack.  · Do not go to your doctor's office or a clinic as they may not be able to provide all the testing and treatment required for this condition.  · If your doctor has given you medicine to take when symptoms occur, take them but don't delay getting help trying to locate medicines.  What happens in the emergency department  The emergency department is connected to your local emergency medical system (EMS) through 911. That's why during a cardiac emergency, calling 911 is the fastest way to get help. The goal of the emergency department is to rapidly screen, evaluate, and treat people.  Once you are there, an electrocardiogram (ECG or heart tracing) will be done. Blood samples may be taken to look for the presence of heart enzymes that leak from damaged heart cells and show if a heart attack is occurring. You will often be evaluated by a heart specialist (cardiologist) who decides the best course of action. In the case of severe angina or early heart attack, and depending on the circumstances, powerful "clot busting" medicines can be used to dissolve blood clots in the coronary artery. In other cases, you may be taken to a cardiac catheterization lab. Here, a tiny balloon-tipped catheter is advanced through blood vessels to the heart. There the balloon is inflated pushing open the blood vessel restoring blood flow.  Risk factors for heart disease  Risk factors for heart disease are a combination of genetic and lifestyle. Many risk factors work by either directly or indirectly damaging the blood vessels of the heart, or by increasing the risk of forming blood or cholesterol clots, which then clog up and block the arteries.     Examples of physical lifestyle risk factors:  · Cigarette smoking  · High blood pressure  · High blood cholesterol  · Use of stimulant drugs such as cocaine, crack, and amphetamines  · Eating a high-fat, high-cholesterol " meal  · Diabetes   · Obesity which increases risk for diabetes and high blood pressure  · Lack of regular physical activity     Examples of emotional lifestyle factors:  · Chronic high stress levels release stress hormones. These raise blood pressure and cholesterol level and makes blood clot more easily.  · Held-in anger, hostile or cynical attitude  · Social and emotional isolation, lack of intimacy  · Loss of relationship  · Depression  Other factors that increase the risk of heart attack that you cannot control :  · Age. The older you get beyond 40, the greater is your risk of significant coronary artery disease.  · Gender. More men than women get heart disease; but once past menopause, women who are not taking estrogen replacement have the same risk as men for a heart attack.  · Family history. If your mother, father, brother or sister has coronary artery disease, your risk of having it is higher than a person your age without this family history.  What can you do to decrease your risk  To reduce your risk of heart disease:  · Get regular checkups with your doctor.  · Take your medicines for blood pressure, cholesterol or diabetes as directed.  · Watch your diet. Eat a heart healthy diet choosing fresh foods, less salt, cholesterol, and fat  · Stop smoking. Get help if needed.  · Get regular exercise.  · Manage stress.  · Carry a list of medicines and doses in your wallet.  Date Last Reviewed: 12/30/2015  © 7931-3403 [employer]. [address] [place] [address], [place] [address]. All rights reserved. This information is not intended as a substitute for professional medical care. Always follow your healthcare professional's instructions.

## 2020-05-13 LAB — INR PPP: 1.9

## 2020-05-14 ENCOUNTER — ANTI-COAG VISIT (OUTPATIENT)
Dept: CARDIOLOGY | Facility: CLINIC | Age: 80
End: 2020-05-14
Payer: MEDICARE

## 2020-05-14 DIAGNOSIS — Z79.01 LONG TERM CURRENT USE OF ANTICOAGULANT THERAPY: ICD-10-CM

## 2020-05-14 PROCEDURE — 93793 ANTICOAG MGMT PT WARFARIN: CPT | Mod: S$GLB,,,

## 2020-05-14 PROCEDURE — 93793 PR ANTICOAGULANT MGMT FOR PT TAKING WARFARIN: ICD-10-PCS | Mod: S$GLB,,,

## 2020-05-14 NOTE — PROGRESS NOTES
INR not at goal. Medications, chart, and patient findings reviewed. See calendar for adjustments to dose and follow up plan.  Will maintain current regimen & re-assess in 4 weeks.

## 2020-06-04 LAB — INR PPP: 1.8

## 2020-06-18 ENCOUNTER — ANTI-COAG VISIT (OUTPATIENT)
Dept: CARDIOLOGY | Facility: CLINIC | Age: 80
End: 2020-06-18
Payer: MEDICARE

## 2020-06-18 DIAGNOSIS — Z79.01 LONG TERM CURRENT USE OF ANTICOAGULANT THERAPY: ICD-10-CM

## 2020-06-18 PROCEDURE — 93793 ANTICOAG MGMT PT WARFARIN: CPT | Mod: S$GLB,,,

## 2020-06-18 PROCEDURE — 93793 PR ANTICOAGULANT MGMT FOR PT TAKING WARFARIN: ICD-10-PCS | Mod: S$GLB,,,

## 2020-06-18 NOTE — PROGRESS NOTES
INR from 6/4.  Pt likely in need of dosing adjustment as last INR subtherapeutic.  Recommend INR Monday.  Pt to continue current regimen until then.

## 2020-06-23 LAB — INR PPP: 2.4

## 2020-06-26 ENCOUNTER — ANTI-COAG VISIT (OUTPATIENT)
Dept: CARDIOLOGY | Facility: CLINIC | Age: 80
End: 2020-06-26
Payer: MEDICARE

## 2020-06-26 DIAGNOSIS — Z79.01 LONG TERM CURRENT USE OF ANTICOAGULANT THERAPY: ICD-10-CM

## 2020-06-26 PROCEDURE — 93793 PR ANTICOAGULANT MGMT FOR PT TAKING WARFARIN: ICD-10-PCS | Mod: S$GLB,,,

## 2020-06-26 PROCEDURE — 93793 ANTICOAG MGMT PT WARFARIN: CPT | Mod: S$GLB,,,

## 2020-06-26 NOTE — PROGRESS NOTES
6/26/20-spoke to pt's granddaughter & r/s lab for 6/29/20.  She has been informed of pt's risk of subtherapeutic levels & importance of monitoring.

## 2020-07-08 ENCOUNTER — OUTSIDE PLACE OF SERVICE (OUTPATIENT)
Dept: CARDIOLOGY | Facility: CLINIC | Age: 80
End: 2020-07-08
Payer: MEDICARE

## 2020-07-08 PROCEDURE — 93010 ELECTROCARDIOGRAM REPORT: CPT | Mod: ,,, | Performed by: INTERNAL MEDICINE

## 2020-07-08 PROCEDURE — 93010 PR ELECTROCARDIOGRAM REPORT: ICD-10-PCS | Mod: ,,, | Performed by: INTERNAL MEDICINE

## 2020-07-23 ENCOUNTER — ANTI-COAG VISIT (OUTPATIENT)
Dept: CARDIOLOGY | Facility: CLINIC | Age: 80
End: 2020-07-23
Payer: MEDICARE

## 2020-07-23 DIAGNOSIS — Z79.01 LONG TERM CURRENT USE OF ANTICOAGULANT THERAPY: ICD-10-CM

## 2020-07-23 LAB — INR PPP: 3.1

## 2020-07-23 PROCEDURE — 93793 PR ANTICOAGULANT MGMT FOR PT TAKING WARFARIN: ICD-10-PCS | Mod: S$GLB,,,

## 2020-07-23 PROCEDURE — 93793 ANTICOAG MGMT PT WARFARIN: CPT | Mod: S$GLB,,,

## 2020-07-23 NOTE — PROGRESS NOTES
INR at goal. Medications and chart reviewed. No changes noted to necessitate adjustment of warfarin or follow-up plan. See calendar.  NOTE: INR from 7/21.

## 2020-08-11 LAB — INR PPP: 1.7

## 2020-08-12 ENCOUNTER — ANTI-COAG VISIT (OUTPATIENT)
Dept: CARDIOLOGY | Facility: CLINIC | Age: 80
End: 2020-08-12
Payer: MEDICARE

## 2020-08-12 DIAGNOSIS — Z79.01 LONG TERM CURRENT USE OF ANTICOAGULANT THERAPY: ICD-10-CM

## 2020-08-12 PROCEDURE — 93793 PR ANTICOAGULANT MGMT FOR PT TAKING WARFARIN: ICD-10-PCS | Mod: S$GLB,,,

## 2020-08-12 PROCEDURE — 93793 ANTICOAG MGMT PT WARFARIN: CPT | Mod: S$GLB,,,

## 2020-08-28 ENCOUNTER — ANTI-COAG VISIT (OUTPATIENT)
Dept: CARDIOLOGY | Facility: CLINIC | Age: 80
End: 2020-08-28
Payer: MEDICARE

## 2020-08-28 DIAGNOSIS — Z79.01 LONG TERM CURRENT USE OF ANTICOAGULANT THERAPY: ICD-10-CM

## 2020-08-28 LAB — INR PPP: 1.9

## 2020-08-28 PROCEDURE — 93793 ANTICOAG MGMT PT WARFARIN: CPT | Mod: S$GLB,,,

## 2020-08-28 PROCEDURE — 93793 PR ANTICOAGULANT MGMT FOR PT TAKING WARFARIN: ICD-10-PCS | Mod: S$GLB,,,

## 2020-09-16 ENCOUNTER — ANTI-COAG VISIT (OUTPATIENT)
Dept: CARDIOLOGY | Facility: CLINIC | Age: 80
End: 2020-09-16
Payer: MEDICARE

## 2020-09-16 DIAGNOSIS — Z79.01 LONG TERM CURRENT USE OF ANTICOAGULANT THERAPY: ICD-10-CM

## 2020-09-16 LAB — INR PPP: 1.2

## 2020-09-16 PROCEDURE — 93793 ANTICOAG MGMT PT WARFARIN: CPT | Mod: S$GLB,,,

## 2020-09-16 PROCEDURE — 93793 PR ANTICOAGULANT MGMT FOR PT TAKING WARFARIN: ICD-10-PCS | Mod: S$GLB,,,

## 2020-09-16 NOTE — PROGRESS NOTES
INR not at goal. Medications, chart, and patient findings reviewed. See calendar for adjustments to dose and follow up plan.  Pt has taken a lower dose than recommended.  He will be instructed to take 10mg 9/16 & resume recommended dosing.  Plan to re-assess in 1 week.  Pt to write down & repeat back dosing.

## 2020-09-23 ENCOUNTER — ANTI-COAG VISIT (OUTPATIENT)
Dept: CARDIOLOGY | Facility: CLINIC | Age: 80
End: 2020-09-23
Payer: MEDICARE

## 2020-09-23 DIAGNOSIS — Z79.01 LONG TERM CURRENT USE OF ANTICOAGULANT THERAPY: ICD-10-CM

## 2020-09-23 LAB — INR PPP: 1.8

## 2020-09-23 PROCEDURE — 93793 PR ANTICOAGULANT MGMT FOR PT TAKING WARFARIN: ICD-10-PCS | Mod: S$GLB,,,

## 2020-09-23 PROCEDURE — 93793 ANTICOAG MGMT PT WARFARIN: CPT | Mod: S$GLB,,,

## 2020-10-07 ENCOUNTER — ANTI-COAG VISIT (OUTPATIENT)
Dept: CARDIOLOGY | Facility: CLINIC | Age: 80
End: 2020-10-07
Payer: MEDICARE

## 2020-10-07 DIAGNOSIS — Z79.01 LONG TERM CURRENT USE OF ANTICOAGULANT THERAPY: ICD-10-CM

## 2020-10-07 LAB — INR PPP: 2.8

## 2020-10-07 PROCEDURE — 93793 ANTICOAG MGMT PT WARFARIN: CPT | Mod: S$GLB,,,

## 2020-10-07 PROCEDURE — 93793 PR ANTICOAGULANT MGMT FOR PT TAKING WARFARIN: ICD-10-PCS | Mod: S$GLB,,,

## 2020-10-21 ENCOUNTER — ANTI-COAG VISIT (OUTPATIENT)
Dept: CARDIOLOGY | Facility: CLINIC | Age: 80
End: 2020-10-21
Payer: MEDICARE

## 2020-10-21 DIAGNOSIS — Z79.01 LONG TERM CURRENT USE OF ANTICOAGULANT THERAPY: ICD-10-CM

## 2020-10-21 LAB — INR PPP: 3.3

## 2020-10-21 PROCEDURE — 93793 PR ANTICOAGULANT MGMT FOR PT TAKING WARFARIN: ICD-10-PCS | Mod: S$GLB,,,

## 2020-10-21 PROCEDURE — 93793 ANTICOAG MGMT PT WARFARIN: CPT | Mod: S$GLB,,,

## 2020-11-05 ENCOUNTER — ANTI-COAG VISIT (OUTPATIENT)
Dept: CARDIOLOGY | Facility: CLINIC | Age: 80
End: 2020-11-05
Payer: MEDICARE

## 2020-11-05 DIAGNOSIS — Z79.01 LONG TERM CURRENT USE OF ANTICOAGULANT THERAPY: ICD-10-CM

## 2020-11-05 LAB — INR PPP: 2.7

## 2020-11-05 PROCEDURE — 93793 ANTICOAG MGMT PT WARFARIN: CPT | Mod: S$GLB,,,

## 2020-11-05 PROCEDURE — 93793 PR ANTICOAGULANT MGMT FOR PT TAKING WARFARIN: ICD-10-PCS | Mod: S$GLB,,,

## 2020-11-18 ENCOUNTER — ANTI-COAG VISIT (OUTPATIENT)
Dept: CARDIOLOGY | Facility: CLINIC | Age: 80
End: 2020-11-18
Payer: MEDICARE

## 2020-11-18 DIAGNOSIS — Z79.01 LONG TERM CURRENT USE OF ANTICOAGULANT THERAPY: ICD-10-CM

## 2020-11-18 LAB — INR PPP: 3

## 2020-11-18 PROCEDURE — 93793 ANTICOAG MGMT PT WARFARIN: CPT | Mod: S$GLB,,,

## 2020-11-18 PROCEDURE — 93793 PR ANTICOAGULANT MGMT FOR PT TAKING WARFARIN: ICD-10-PCS | Mod: S$GLB,,,

## 2020-12-02 ENCOUNTER — ANTI-COAG VISIT (OUTPATIENT)
Dept: CARDIOLOGY | Facility: CLINIC | Age: 80
End: 2020-12-02
Payer: MEDICARE

## 2020-12-02 DIAGNOSIS — Z79.01 LONG TERM CURRENT USE OF ANTICOAGULANT THERAPY: ICD-10-CM

## 2020-12-02 LAB — INR PPP: 2.9

## 2020-12-02 PROCEDURE — 93793 PR ANTICOAGULANT MGMT FOR PT TAKING WARFARIN: ICD-10-PCS | Mod: S$GLB,,,

## 2020-12-02 PROCEDURE — 93793 ANTICOAG MGMT PT WARFARIN: CPT | Mod: S$GLB,,,

## 2020-12-16 ENCOUNTER — ANTI-COAG VISIT (OUTPATIENT)
Dept: CARDIOLOGY | Facility: CLINIC | Age: 80
End: 2020-12-16
Payer: MEDICARE

## 2020-12-16 DIAGNOSIS — Z79.01 LONG TERM CURRENT USE OF ANTICOAGULANT THERAPY: ICD-10-CM

## 2020-12-16 LAB — INR PPP: 2.6

## 2020-12-16 PROCEDURE — 93793 ANTICOAG MGMT PT WARFARIN: CPT | Mod: S$GLB,,,

## 2020-12-16 PROCEDURE — 93793 PR ANTICOAGULANT MGMT FOR PT TAKING WARFARIN: ICD-10-PCS | Mod: S$GLB,,,

## 2021-01-06 ENCOUNTER — ANTI-COAG VISIT (OUTPATIENT)
Dept: CARDIOLOGY | Facility: CLINIC | Age: 81
End: 2021-01-06
Payer: MEDICARE

## 2021-01-06 DIAGNOSIS — Z79.01 LONG TERM CURRENT USE OF ANTICOAGULANT THERAPY: ICD-10-CM

## 2021-01-06 LAB — INR PPP: 3

## 2021-01-06 PROCEDURE — 93793 ANTICOAG MGMT PT WARFARIN: CPT | Mod: S$GLB,,,

## 2021-01-06 PROCEDURE — 93793 PR ANTICOAGULANT MGMT FOR PT TAKING WARFARIN: ICD-10-PCS | Mod: S$GLB,,,

## 2021-01-29 ENCOUNTER — ANTI-COAG VISIT (OUTPATIENT)
Dept: CARDIOLOGY | Facility: CLINIC | Age: 81
End: 2021-01-29
Payer: MEDICARE

## 2021-01-29 DIAGNOSIS — Z79.01 LONG TERM CURRENT USE OF ANTICOAGULANT THERAPY: Primary | ICD-10-CM

## 2021-01-29 DIAGNOSIS — Z79.01 LONG TERM (CURRENT) USE OF ANTICOAGULANTS: ICD-10-CM

## 2021-01-29 LAB — INR PPP: 2.8

## 2021-01-29 PROCEDURE — 93793 ANTICOAG MGMT PT WARFARIN: CPT | Mod: S$GLB,,,

## 2021-01-29 PROCEDURE — 93793 PR ANTICOAGULANT MGMT FOR PT TAKING WARFARIN: ICD-10-PCS | Mod: S$GLB,,,

## 2021-03-03 ENCOUNTER — ANTI-COAG VISIT (OUTPATIENT)
Dept: CARDIOLOGY | Facility: CLINIC | Age: 81
End: 2021-03-03
Payer: MEDICARE

## 2021-03-03 DIAGNOSIS — Z79.01 LONG TERM CURRENT USE OF ANTICOAGULANT THERAPY: Primary | ICD-10-CM

## 2021-03-03 LAB — INR PPP: 3.4

## 2021-03-03 PROCEDURE — 93793 ANTICOAG MGMT PT WARFARIN: CPT | Mod: S$GLB,,,

## 2021-03-03 PROCEDURE — 93793 PR ANTICOAGULANT MGMT FOR PT TAKING WARFARIN: ICD-10-PCS | Mod: S$GLB,,,

## 2021-03-24 ENCOUNTER — ANTI-COAG VISIT (OUTPATIENT)
Dept: CARDIOLOGY | Facility: CLINIC | Age: 81
End: 2021-03-24
Payer: MEDICARE

## 2021-03-24 DIAGNOSIS — Z79.01 LONG TERM CURRENT USE OF ANTICOAGULANT THERAPY: Primary | ICD-10-CM

## 2021-03-24 LAB — INR PPP: 3.3

## 2021-03-24 PROCEDURE — 93793 ANTICOAG MGMT PT WARFARIN: CPT | Mod: S$GLB,,,

## 2021-03-24 PROCEDURE — 93793 PR ANTICOAGULANT MGMT FOR PT TAKING WARFARIN: ICD-10-PCS | Mod: S$GLB,,,

## 2021-04-07 ENCOUNTER — ANTI-COAG VISIT (OUTPATIENT)
Dept: CARDIOLOGY | Facility: CLINIC | Age: 81
End: 2021-04-07
Payer: MEDICARE

## 2021-04-07 DIAGNOSIS — Z79.01 LONG TERM CURRENT USE OF ANTICOAGULANT THERAPY: Primary | ICD-10-CM

## 2021-04-07 LAB — INR PPP: 3.9

## 2021-04-07 PROCEDURE — 93793 PR ANTICOAGULANT MGMT FOR PT TAKING WARFARIN: ICD-10-PCS | Mod: S$GLB,,,

## 2021-04-07 PROCEDURE — 93793 ANTICOAG MGMT PT WARFARIN: CPT | Mod: S$GLB,,,

## 2021-04-21 ENCOUNTER — ANTI-COAG VISIT (OUTPATIENT)
Dept: CARDIOLOGY | Facility: CLINIC | Age: 81
End: 2021-04-21
Payer: MEDICARE

## 2021-04-21 DIAGNOSIS — Z79.01 LONG TERM CURRENT USE OF ANTICOAGULANT THERAPY: Primary | ICD-10-CM

## 2021-04-21 LAB — INR PPP: 1.4

## 2021-04-21 PROCEDURE — 93793 ANTICOAG MGMT PT WARFARIN: CPT | Mod: S$GLB,,,

## 2021-04-21 PROCEDURE — 93793 PR ANTICOAGULANT MGMT FOR PT TAKING WARFARIN: ICD-10-PCS | Mod: S$GLB,,,

## 2021-04-28 ENCOUNTER — ANTI-COAG VISIT (OUTPATIENT)
Dept: CARDIOLOGY | Facility: CLINIC | Age: 81
End: 2021-04-28
Payer: MEDICARE

## 2021-04-28 DIAGNOSIS — Z79.01 LONG TERM CURRENT USE OF ANTICOAGULANT THERAPY: Primary | ICD-10-CM

## 2021-04-28 LAB — INR PPP: 3.2

## 2021-04-28 PROCEDURE — 93793 ANTICOAG MGMT PT WARFARIN: CPT | Mod: S$GLB,,,

## 2021-04-28 PROCEDURE — 93793 PR ANTICOAGULANT MGMT FOR PT TAKING WARFARIN: ICD-10-PCS | Mod: S$GLB,,,

## 2021-05-12 ENCOUNTER — ANTI-COAG VISIT (OUTPATIENT)
Dept: CARDIOLOGY | Facility: CLINIC | Age: 81
End: 2021-05-12
Payer: MEDICARE

## 2021-05-12 DIAGNOSIS — Z79.01 LONG TERM CURRENT USE OF ANTICOAGULANT THERAPY: Primary | ICD-10-CM

## 2021-05-12 LAB — INR PPP: 3

## 2021-05-12 PROCEDURE — 93793 ANTICOAG MGMT PT WARFARIN: CPT | Mod: S$GLB,,,

## 2021-05-12 PROCEDURE — 93793 PR ANTICOAGULANT MGMT FOR PT TAKING WARFARIN: ICD-10-PCS | Mod: S$GLB,,,

## 2021-05-26 ENCOUNTER — ANTI-COAG VISIT (OUTPATIENT)
Dept: CARDIOLOGY | Facility: CLINIC | Age: 81
End: 2021-05-26
Payer: MEDICARE

## 2021-05-26 DIAGNOSIS — Z79.01 LONG TERM CURRENT USE OF ANTICOAGULANT THERAPY: Primary | ICD-10-CM

## 2021-05-26 LAB — INR PPP: 2.6

## 2021-05-26 PROCEDURE — 93793 PR ANTICOAGULANT MGMT FOR PT TAKING WARFARIN: ICD-10-PCS | Mod: S$GLB,,,

## 2021-05-26 PROCEDURE — 93793 ANTICOAG MGMT PT WARFARIN: CPT | Mod: S$GLB,,,

## 2021-06-09 ENCOUNTER — ANTI-COAG VISIT (OUTPATIENT)
Dept: CARDIOLOGY | Facility: CLINIC | Age: 81
End: 2021-06-09
Payer: MEDICARE

## 2021-06-09 DIAGNOSIS — Z79.01 LONG TERM CURRENT USE OF ANTICOAGULANT THERAPY: Primary | ICD-10-CM

## 2021-06-09 LAB — INR PPP: 2.9

## 2021-06-09 PROCEDURE — 93793 PR ANTICOAGULANT MGMT FOR PT TAKING WARFARIN: ICD-10-PCS | Mod: S$GLB,,,

## 2021-06-09 PROCEDURE — 93793 ANTICOAG MGMT PT WARFARIN: CPT | Mod: S$GLB,,,

## 2021-06-23 ENCOUNTER — ANTI-COAG VISIT (OUTPATIENT)
Dept: CARDIOLOGY | Facility: CLINIC | Age: 81
End: 2021-06-23
Payer: MEDICARE

## 2021-06-23 DIAGNOSIS — I48.0 PAROXYSMAL ATRIAL FIBRILLATION: ICD-10-CM

## 2021-06-23 DIAGNOSIS — Z79.01 LONG TERM CURRENT USE OF ANTICOAGULANT THERAPY: Primary | ICD-10-CM

## 2021-06-23 DIAGNOSIS — I48.91 ATRIAL FIBRILLATION, UNSPECIFIED TYPE: ICD-10-CM

## 2021-06-23 LAB — INR PPP: 2.7

## 2021-06-23 PROCEDURE — 93793 PR ANTICOAGULANT MGMT FOR PT TAKING WARFARIN: ICD-10-PCS | Mod: S$GLB,,,

## 2021-06-23 PROCEDURE — 93793 ANTICOAG MGMT PT WARFARIN: CPT | Mod: S$GLB,,,

## 2021-06-23 RX ORDER — WARFARIN 4 MG/1
TABLET ORAL
Qty: 65 TABLET | Refills: 3 | Status: SHIPPED | OUTPATIENT
Start: 2021-06-23 | End: 2022-03-18 | Stop reason: SDUPTHER

## 2021-07-14 ENCOUNTER — ANTI-COAG VISIT (OUTPATIENT)
Dept: CARDIOLOGY | Facility: CLINIC | Age: 81
End: 2021-07-14
Payer: MEDICARE

## 2021-07-14 DIAGNOSIS — Z79.01 LONG TERM CURRENT USE OF ANTICOAGULANT THERAPY: Primary | ICD-10-CM

## 2021-07-14 LAB — INR PPP: 1.5

## 2021-07-14 PROCEDURE — 93793 ANTICOAG MGMT PT WARFARIN: CPT | Mod: S$GLB,,,

## 2021-07-14 PROCEDURE — 93793 PR ANTICOAGULANT MGMT FOR PT TAKING WARFARIN: ICD-10-PCS | Mod: S$GLB,,,

## 2021-07-27 ENCOUNTER — OFFICE VISIT (OUTPATIENT)
Dept: CARDIOLOGY | Facility: CLINIC | Age: 81
End: 2021-07-27
Payer: MEDICARE

## 2021-07-27 VITALS
BODY MASS INDEX: 23.46 KG/M2 | SYSTOLIC BLOOD PRESSURE: 105 MMHG | DIASTOLIC BLOOD PRESSURE: 54 MMHG | HEART RATE: 45 BPM | WEIGHT: 173 LBS

## 2021-07-27 DIAGNOSIS — I48.91 ATRIAL FIBRILLATION, UNSPECIFIED TYPE: Primary | ICD-10-CM

## 2021-07-27 DIAGNOSIS — I10 ESSENTIAL HYPERTENSION: ICD-10-CM

## 2021-07-27 DIAGNOSIS — I25.119 CORONARY ARTERY DISEASE INVOLVING NATIVE CORONARY ARTERY OF NATIVE HEART WITH ANGINA PECTORIS: ICD-10-CM

## 2021-07-27 DIAGNOSIS — E78.5 HYPERLIPIDEMIA, UNSPECIFIED HYPERLIPIDEMIA TYPE: ICD-10-CM

## 2021-07-27 PROCEDURE — 3078F PR MOST RECENT DIASTOLIC BLOOD PRESSURE < 80 MM HG: ICD-10-PCS | Mod: CPTII,S$GLB,, | Performed by: INTERNAL MEDICINE

## 2021-07-27 PROCEDURE — 1160F RVW MEDS BY RX/DR IN RCRD: CPT | Mod: CPTII,S$GLB,, | Performed by: INTERNAL MEDICINE

## 2021-07-27 PROCEDURE — 99999 PR PBB SHADOW E&M-EST. PATIENT-LVL III: CPT | Mod: PBBFAC,,, | Performed by: INTERNAL MEDICINE

## 2021-07-27 PROCEDURE — 1159F PR MEDICATION LIST DOCUMENTED IN MEDICAL RECORD: ICD-10-PCS | Mod: CPTII,S$GLB,, | Performed by: INTERNAL MEDICINE

## 2021-07-27 PROCEDURE — 1126F AMNT PAIN NOTED NONE PRSNT: CPT | Mod: CPTII,S$GLB,, | Performed by: INTERNAL MEDICINE

## 2021-07-27 PROCEDURE — 3074F PR MOST RECENT SYSTOLIC BLOOD PRESSURE < 130 MM HG: ICD-10-PCS | Mod: CPTII,S$GLB,, | Performed by: INTERNAL MEDICINE

## 2021-07-27 PROCEDURE — 99214 PR OFFICE/OUTPT VISIT, EST, LEVL IV, 30-39 MIN: ICD-10-PCS | Mod: S$GLB,,, | Performed by: INTERNAL MEDICINE

## 2021-07-27 PROCEDURE — 1159F MED LIST DOCD IN RCRD: CPT | Mod: CPTII,S$GLB,, | Performed by: INTERNAL MEDICINE

## 2021-07-27 PROCEDURE — 1126F PR PAIN SEVERITY QUANTIFIED, NO PAIN PRESENT: ICD-10-PCS | Mod: CPTII,S$GLB,, | Performed by: INTERNAL MEDICINE

## 2021-07-27 PROCEDURE — 99214 OFFICE O/P EST MOD 30 MIN: CPT | Mod: S$GLB,,, | Performed by: INTERNAL MEDICINE

## 2021-07-27 PROCEDURE — 99999 PR PBB SHADOW E&M-EST. PATIENT-LVL III: ICD-10-PCS | Mod: PBBFAC,,, | Performed by: INTERNAL MEDICINE

## 2021-07-27 PROCEDURE — 1160F PR REVIEW ALL MEDS BY PRESCRIBER/CLIN PHARMACIST DOCUMENTED: ICD-10-PCS | Mod: CPTII,S$GLB,, | Performed by: INTERNAL MEDICINE

## 2021-07-27 PROCEDURE — 3074F SYST BP LT 130 MM HG: CPT | Mod: CPTII,S$GLB,, | Performed by: INTERNAL MEDICINE

## 2021-07-27 PROCEDURE — 3078F DIAST BP <80 MM HG: CPT | Mod: CPTII,S$GLB,, | Performed by: INTERNAL MEDICINE

## 2021-07-27 RX ORDER — LOSARTAN POTASSIUM 50 MG/1
50 TABLET ORAL DAILY
Qty: 30 TABLET | Refills: 11 | Status: ON HOLD
Start: 2021-07-27 | End: 2022-09-23

## 2021-07-27 RX ORDER — GABAPENTIN 100 MG/1
100 CAPSULE ORAL 3 TIMES DAILY
COMMUNITY
End: 2023-03-20 | Stop reason: DRUGHIGH

## 2021-07-28 ENCOUNTER — ANTI-COAG VISIT (OUTPATIENT)
Dept: CARDIOLOGY | Facility: CLINIC | Age: 81
End: 2021-07-28
Payer: MEDICARE

## 2021-07-28 DIAGNOSIS — Z79.01 LONG TERM CURRENT USE OF ANTICOAGULANT THERAPY: Primary | ICD-10-CM

## 2021-07-28 LAB — INR PPP: 3.1

## 2021-07-28 PROCEDURE — 93793 ANTICOAG MGMT PT WARFARIN: CPT | Mod: S$GLB,,,

## 2021-07-28 PROCEDURE — 93793 PR ANTICOAGULANT MGMT FOR PT TAKING WARFARIN: ICD-10-PCS | Mod: S$GLB,,,

## 2021-08-05 ENCOUNTER — ANTI-COAG VISIT (OUTPATIENT)
Dept: CARDIOLOGY | Facility: CLINIC | Age: 81
End: 2021-08-05
Payer: MEDICARE

## 2021-08-05 DIAGNOSIS — Z79.01 LONG TERM CURRENT USE OF ANTICOAGULANT THERAPY: Primary | ICD-10-CM

## 2021-08-05 LAB — INR PPP: 4.5

## 2021-08-05 PROCEDURE — 93793 PR ANTICOAGULANT MGMT FOR PT TAKING WARFARIN: ICD-10-PCS | Mod: S$GLB,,,

## 2021-08-05 PROCEDURE — 93793 ANTICOAG MGMT PT WARFARIN: CPT | Mod: S$GLB,,,

## 2021-08-09 ENCOUNTER — ANTI-COAG VISIT (OUTPATIENT)
Dept: CARDIOLOGY | Facility: CLINIC | Age: 81
End: 2021-08-09
Payer: MEDICARE

## 2021-08-09 DIAGNOSIS — Z79.01 LONG TERM CURRENT USE OF ANTICOAGULANT THERAPY: Primary | ICD-10-CM

## 2021-08-09 LAB — INR PPP: 3.7

## 2021-08-09 PROCEDURE — 93793 PR ANTICOAGULANT MGMT FOR PT TAKING WARFARIN: ICD-10-PCS | Mod: S$GLB,,,

## 2021-08-09 PROCEDURE — 93793 ANTICOAG MGMT PT WARFARIN: CPT | Mod: S$GLB,,,

## 2021-08-16 ENCOUNTER — ANTI-COAG VISIT (OUTPATIENT)
Dept: CARDIOLOGY | Facility: CLINIC | Age: 81
End: 2021-08-16
Payer: MEDICARE

## 2021-08-16 DIAGNOSIS — Z79.01 LONG TERM CURRENT USE OF ANTICOAGULANT THERAPY: Primary | ICD-10-CM

## 2021-08-16 LAB — INR PPP: 2.5

## 2021-08-16 PROCEDURE — 93793 PR ANTICOAGULANT MGMT FOR PT TAKING WARFARIN: ICD-10-PCS | Mod: S$GLB,,,

## 2021-08-16 PROCEDURE — 93793 ANTICOAG MGMT PT WARFARIN: CPT | Mod: S$GLB,,,

## 2021-09-21 ENCOUNTER — ANTI-COAG VISIT (OUTPATIENT)
Dept: CARDIOLOGY | Facility: CLINIC | Age: 81
End: 2021-09-21
Payer: MEDICARE

## 2021-09-21 DIAGNOSIS — Z79.01 LONG TERM CURRENT USE OF ANTICOAGULANT THERAPY: Primary | ICD-10-CM

## 2021-09-21 LAB — INR PPP: 2.21

## 2021-09-21 PROCEDURE — 93793 PR ANTICOAGULANT MGMT FOR PT TAKING WARFARIN: ICD-10-PCS | Mod: S$GLB,,,

## 2021-09-21 PROCEDURE — 93793 ANTICOAG MGMT PT WARFARIN: CPT | Mod: S$GLB,,,

## 2021-10-26 ENCOUNTER — ANTI-COAG VISIT (OUTPATIENT)
Dept: CARDIOLOGY | Facility: CLINIC | Age: 81
End: 2021-10-26
Payer: MEDICARE

## 2021-10-26 DIAGNOSIS — Z79.01 LONG TERM CURRENT USE OF ANTICOAGULANT THERAPY: Primary | ICD-10-CM

## 2021-10-26 LAB — INR PPP: 1.58

## 2021-10-26 PROCEDURE — 93793 PR ANTICOAGULANT MGMT FOR PT TAKING WARFARIN: ICD-10-PCS | Mod: S$GLB,,,

## 2021-10-26 PROCEDURE — 93793 ANTICOAG MGMT PT WARFARIN: CPT | Mod: S$GLB,,,

## 2021-11-04 ENCOUNTER — ANTI-COAG VISIT (OUTPATIENT)
Dept: CARDIOLOGY | Facility: CLINIC | Age: 81
End: 2021-11-04
Payer: MEDICARE

## 2021-11-04 DIAGNOSIS — Z79.01 LONG TERM CURRENT USE OF ANTICOAGULANT THERAPY: Primary | ICD-10-CM

## 2021-11-04 LAB — INR PPP: 3.14

## 2021-11-04 PROCEDURE — 93793 ANTICOAG MGMT PT WARFARIN: CPT | Mod: S$GLB,,,

## 2021-11-04 PROCEDURE — 93793 PR ANTICOAGULANT MGMT FOR PT TAKING WARFARIN: ICD-10-PCS | Mod: S$GLB,,,

## 2021-12-14 ENCOUNTER — ANTI-COAG VISIT (OUTPATIENT)
Dept: CARDIOLOGY | Facility: CLINIC | Age: 81
End: 2021-12-14

## 2021-12-14 DIAGNOSIS — Z79.01 LONG TERM CURRENT USE OF ANTICOAGULANT THERAPY: ICD-10-CM

## 2021-12-14 DIAGNOSIS — Z79.01 LONG TERM (CURRENT) USE OF ANTICOAGULANTS: Primary | ICD-10-CM

## 2021-12-14 LAB — INR PPP: 2.58

## 2021-12-17 ENCOUNTER — ANTI-COAG VISIT (OUTPATIENT)
Dept: CARDIOLOGY | Facility: CLINIC | Age: 81
End: 2021-12-17
Payer: MEDICARE

## 2021-12-17 DIAGNOSIS — Z79.01 LONG TERM CURRENT USE OF ANTICOAGULANT THERAPY: Primary | ICD-10-CM

## 2021-12-17 LAB — INR PPP: 1.58

## 2021-12-17 PROCEDURE — 99211 PR OFFICE/OUTPT VISIT, EST, LEVL I: ICD-10-PCS | Mod: S$GLB,,, | Performed by: INTERNAL MEDICINE

## 2021-12-17 PROCEDURE — 99211 OFF/OP EST MAY X REQ PHY/QHP: CPT | Mod: S$GLB,,, | Performed by: INTERNAL MEDICINE

## 2022-01-07 ENCOUNTER — ANTI-COAG VISIT (OUTPATIENT)
Dept: CARDIOLOGY | Facility: CLINIC | Age: 82
End: 2022-01-07
Payer: MEDICARE

## 2022-01-07 DIAGNOSIS — Z79.01 LONG TERM CURRENT USE OF ANTICOAGULANT THERAPY: Primary | ICD-10-CM

## 2022-01-07 LAB — INR PPP: 2.2

## 2022-01-07 PROCEDURE — 99211 PR OFFICE/OUTPT VISIT, EST, LEVL I: ICD-10-PCS | Mod: S$GLB,,, | Performed by: INTERNAL MEDICINE

## 2022-01-07 PROCEDURE — 99211 OFF/OP EST MAY X REQ PHY/QHP: CPT | Mod: S$GLB,,, | Performed by: INTERNAL MEDICINE

## 2022-01-07 NOTE — PROGRESS NOTES
INR at goal. Medications and chart reviewed. No changes noted to necessitate adjustment of warfarin or follow-up plan. See calendar.  INR from 1/4.

## 2022-02-03 ENCOUNTER — ANTI-COAG VISIT (OUTPATIENT)
Dept: CARDIOLOGY | Facility: CLINIC | Age: 82
End: 2022-02-03
Payer: MEDICARE

## 2022-02-03 DIAGNOSIS — Z79.01 LONG TERM CURRENT USE OF ANTICOAGULANT THERAPY: Primary | ICD-10-CM

## 2022-02-03 LAB — INR PPP: 1.84

## 2022-02-03 PROCEDURE — 99211 OFF/OP EST MAY X REQ PHY/QHP: CPT | Mod: S$GLB,,, | Performed by: INTERNAL MEDICINE

## 2022-02-03 PROCEDURE — 99211 PR OFFICE/OUTPT VISIT, EST, LEVL I: ICD-10-PCS | Mod: S$GLB,,, | Performed by: INTERNAL MEDICINE

## 2022-02-03 NOTE — PROGRESS NOTES
INR not at goal. Medications, chart, and patient findings reviewed. See calendar for adjustments to dose and follow up plan.  DDI w/ allopurinol could result in decreased INR. However, pt likely subtherapeutic due to missing dosing.

## 2022-02-24 ENCOUNTER — ANTI-COAG VISIT (OUTPATIENT)
Dept: CARDIOLOGY | Facility: CLINIC | Age: 82
End: 2022-02-24

## 2022-02-24 DIAGNOSIS — I48.91 ATRIAL FIBRILLATION, UNSPECIFIED TYPE: ICD-10-CM

## 2022-02-24 DIAGNOSIS — Z79.01 LONG TERM CURRENT USE OF ANTICOAGULANT THERAPY: Primary | ICD-10-CM

## 2022-02-24 LAB — INR PPP: 2.1

## 2022-02-24 PROCEDURE — 93793 ANTICOAG MGMT PT WARFARIN: CPT | Mod: ,,,

## 2022-02-24 PROCEDURE — 93793 PR ANTICOAGULANT MGMT FOR PT TAKING WARFARIN: ICD-10-PCS | Mod: ,,,

## 2022-03-17 ENCOUNTER — ANTI-COAG VISIT (OUTPATIENT)
Dept: CARDIOLOGY | Facility: CLINIC | Age: 82
End: 2022-03-17

## 2022-03-17 DIAGNOSIS — I48.91 ATRIAL FIBRILLATION, UNSPECIFIED TYPE: ICD-10-CM

## 2022-03-17 DIAGNOSIS — I48.0 PAROXYSMAL ATRIAL FIBRILLATION: ICD-10-CM

## 2022-03-17 DIAGNOSIS — Z79.01 LONG TERM CURRENT USE OF ANTICOAGULANT THERAPY: Primary | ICD-10-CM

## 2022-03-17 LAB — INR PPP: 1.96

## 2022-03-17 PROCEDURE — 99211 OFF/OP EST MAY X REQ PHY/QHP: CPT | Mod: S$PBB,,, | Performed by: INTERNAL MEDICINE

## 2022-03-17 PROCEDURE — 99211 PR OFFICE/OUTPT VISIT, EST, LEVL I: ICD-10-PCS | Mod: S$PBB,,, | Performed by: INTERNAL MEDICINE

## 2022-03-17 NOTE — PROGRESS NOTES
INR not at goal. Medications, chart, and patient findings reviewed. See calendar for adjustments to dose and follow up plan.  Pt the following variables that could have effect INR: change in alcohol intake & missed dosing.  Recommend to boost & re-assess in 2 weeks.  Will stress compliance and pt to keep alcohol intake consistent.

## 2022-03-17 NOTE — PROGRESS NOTES
Verbal result taken from __Amy_______. PT/INR __1.96_____ Date drawn___3/17_____ Hardcopy to be faxed.

## 2022-03-18 RX ORDER — WARFARIN 4 MG/1
TABLET ORAL
Qty: 50 TABLET | Refills: 0 | Status: SHIPPED | OUTPATIENT
Start: 2022-03-18 | End: 2023-03-20 | Stop reason: DRUGHIGH

## 2022-03-18 NOTE — PROGRESS NOTES
Patient called to report he did take 8mg of warfarin 3/17/22, Patient was given further day by day warfarin instructions and redraw date, he verbalized understanding, Patient requested a warfarin 4mg tablet prescription be called in to [facility] mail order pharmacy

## 2022-04-06 ENCOUNTER — ANTI-COAG VISIT (OUTPATIENT)
Dept: CARDIOLOGY | Facility: CLINIC | Age: 82
End: 2022-04-06

## 2022-04-06 DIAGNOSIS — Z79.01 LONG TERM CURRENT USE OF ANTICOAGULANT THERAPY: Primary | ICD-10-CM

## 2022-04-06 LAB — INR PPP: 1.52

## 2022-04-06 PROCEDURE — 99211 OFF/OP EST MAY X REQ PHY/QHP: CPT | Mod: S$PBB,,, | Performed by: INTERNAL MEDICINE

## 2022-04-06 PROCEDURE — 99211 PR OFFICE/OUTPT VISIT, EST, LEVL I: ICD-10-PCS | Mod: S$PBB,,, | Performed by: INTERNAL MEDICINE

## 2022-04-06 NOTE — PROGRESS NOTES
INR not at goal. Medications, chart, and patient findings reviewed. See calendar for adjustments to dose and follow up plan.  Pt continues to trend below therapeutic range.  Recommend to increase maintenance dose & re-assess in 1 week.

## 2022-04-13 ENCOUNTER — ANTI-COAG VISIT (OUTPATIENT)
Dept: CARDIOLOGY | Facility: CLINIC | Age: 82
End: 2022-04-13

## 2022-04-13 DIAGNOSIS — Z79.01 LONG TERM CURRENT USE OF ANTICOAGULANT THERAPY: Primary | ICD-10-CM

## 2022-04-13 LAB — INR PPP: 2.81

## 2022-04-13 PROCEDURE — 93793 ANTICOAG MGMT PT WARFARIN: CPT | Mod: ,,,

## 2022-04-13 PROCEDURE — 93793 PR ANTICOAGULANT MGMT FOR PT TAKING WARFARIN: ICD-10-PCS | Mod: ,,,

## 2022-04-13 NOTE — PROGRESS NOTES
Verbal result taken from Kemi BONNER at [facility]    PT/INR 2.81   Date drawn 4/12/22   Hardcopy to be faxed

## 2022-04-20 NOTE — PROGRESS NOTES
4/20/22  Patient called and will go to lab today 4/20/22, states never got a call on next lab date from 4/05 but he had another lab 4/12 and stated he did not get lab 4/12, he does not recall going to lab 4/12

## 2022-04-21 ENCOUNTER — ANTI-COAG VISIT (OUTPATIENT)
Dept: CARDIOLOGY | Facility: CLINIC | Age: 82
End: 2022-04-21

## 2022-04-21 DIAGNOSIS — Z79.01 LONG TERM CURRENT USE OF ANTICOAGULANT THERAPY: Primary | ICD-10-CM

## 2022-04-21 LAB — INR PPP: 1.73

## 2022-04-21 PROCEDURE — 93793 PR ANTICOAGULANT MGMT FOR PT TAKING WARFARIN: ICD-10-PCS | Mod: ,,,

## 2022-04-21 PROCEDURE — 93793 ANTICOAG MGMT PT WARFARIN: CPT | Mod: ,,,

## 2022-04-22 NOTE — PROGRESS NOTES
INR not at goal. Medications, chart, and patient findings reviewed. See calendar for adjustments to dose and follow up plan.  Will stress compliance to pt, boost dosing & re-assess in 1 week.

## 2022-04-29 ENCOUNTER — ANTI-COAG VISIT (OUTPATIENT)
Dept: CARDIOLOGY | Facility: CLINIC | Age: 82
End: 2022-04-29

## 2022-04-29 DIAGNOSIS — Z79.01 LONG TERM CURRENT USE OF ANTICOAGULANT THERAPY: Primary | ICD-10-CM

## 2022-04-29 LAB
INR PPP: 1.9
INR PPP: 1.9

## 2022-04-29 PROCEDURE — 93793 PR ANTICOAGULANT MGMT FOR PT TAKING WARFARIN: ICD-10-PCS | Mod: ,,,

## 2022-04-29 PROCEDURE — 93793 ANTICOAG MGMT PT WARFARIN: CPT | Mod: ,,,

## 2022-04-29 NOTE — PROGRESS NOTES
Verbal result taken from Alana HAYWOOD at Dr. [facility] office  PT/INR 1.9   Date drawn 4/27/22  Hardcopy to be faxed

## 2022-05-05 ENCOUNTER — ANTI-COAG VISIT (OUTPATIENT)
Dept: CARDIOLOGY | Facility: CLINIC | Age: 82
End: 2022-05-05

## 2022-05-05 DIAGNOSIS — Z79.01 LONG TERM CURRENT USE OF ANTICOAGULANT THERAPY: Primary | ICD-10-CM

## 2022-05-05 LAB — INR PPP: 1.6

## 2022-05-05 PROCEDURE — 93793 ANTICOAG MGMT PT WARFARIN: CPT | Mod: ,,,

## 2022-05-05 PROCEDURE — 93793 PR ANTICOAGULANT MGMT FOR PT TAKING WARFARIN: ICD-10-PCS | Mod: ,,,

## 2022-05-05 NOTE — PROGRESS NOTES
INR not at goal. Medications, chart, and patient findings reviewed. See calendar for adjustments to dose and follow up plan.  Will stress compliance.

## 2022-05-11 LAB — INR PPP: 1.7

## 2022-05-13 ENCOUNTER — ANTI-COAG VISIT (OUTPATIENT)
Dept: CARDIOLOGY | Facility: CLINIC | Age: 82
End: 2022-05-13

## 2022-05-13 DIAGNOSIS — Z79.01 LONG TERM CURRENT USE OF ANTICOAGULANT THERAPY: Primary | ICD-10-CM

## 2022-05-13 PROCEDURE — 93793 PR ANTICOAGULANT MGMT FOR PT TAKING WARFARIN: ICD-10-PCS | Mod: ,,,

## 2022-05-13 PROCEDURE — 93793 ANTICOAG MGMT PT WARFARIN: CPT | Mod: ,,,

## 2022-05-19 ENCOUNTER — ANTI-COAG VISIT (OUTPATIENT)
Dept: CARDIOLOGY | Facility: CLINIC | Age: 82
End: 2022-05-19

## 2022-05-19 DIAGNOSIS — Z79.01 LONG TERM CURRENT USE OF ANTICOAGULANT THERAPY: Primary | ICD-10-CM

## 2022-05-19 LAB — INR PPP: 1.9

## 2022-05-19 PROCEDURE — 93793 PR ANTICOAGULANT MGMT FOR PT TAKING WARFARIN: ICD-10-PCS | Mod: ,,,

## 2022-05-19 PROCEDURE — 93793 ANTICOAG MGMT PT WARFARIN: CPT | Mod: ,,,

## 2022-05-19 NOTE — PROGRESS NOTES
INR not at goal. Medications, chart, and patient findings reviewed. See calendar for adjustments to dose and follow up plan.  Recommend to increase maintenance dose & re-assess.

## 2022-06-13 ENCOUNTER — ANTI-COAG VISIT (OUTPATIENT)
Dept: CARDIOLOGY | Facility: CLINIC | Age: 82
End: 2022-06-13

## 2022-06-13 DIAGNOSIS — Z79.01 LONG TERM CURRENT USE OF ANTICOAGULANT THERAPY: Primary | ICD-10-CM

## 2022-06-13 LAB — INR PPP: 2

## 2022-06-13 PROCEDURE — 93793 ANTICOAG MGMT PT WARFARIN: CPT | Mod: ,,,

## 2022-06-13 PROCEDURE — 93793 PR ANTICOAGULANT MGMT FOR PT TAKING WARFARIN: ICD-10-PCS | Mod: ,,,

## 2022-06-13 NOTE — PROGRESS NOTES
INR at goal. Medications and chart reviewed. No changes noted to necessitate adjustment of warfarin or follow-up plan. See calendar.  INR from 6/9.

## 2022-06-28 ENCOUNTER — ANTI-COAG VISIT (OUTPATIENT)
Dept: CARDIOLOGY | Facility: CLINIC | Age: 82
End: 2022-06-28

## 2022-06-28 DIAGNOSIS — Z79.01 LONG TERM CURRENT USE OF ANTICOAGULANT THERAPY: Primary | ICD-10-CM

## 2022-06-28 LAB — INR PPP: 1.4

## 2022-06-28 PROCEDURE — 93793 PR ANTICOAGULANT MGMT FOR PT TAKING WARFARIN: ICD-10-PCS | Mod: ,,,

## 2022-06-28 PROCEDURE — 93793 ANTICOAG MGMT PT WARFARIN: CPT | Mod: ,,,

## 2022-06-29 NOTE — PROGRESS NOTES
INR not at goal. Medications, chart, and patient findings reviewed. See calendar for adjustments to dose and follow up plan.  INR from 6/23.  Recommend to re-assess before making any adjustments.

## 2022-06-30 LAB — INR PPP: 1.8

## 2022-07-01 ENCOUNTER — ANTI-COAG VISIT (OUTPATIENT)
Dept: CARDIOLOGY | Facility: CLINIC | Age: 82
End: 2022-07-01

## 2022-07-01 DIAGNOSIS — Z79.01 LONG TERM CURRENT USE OF ANTICOAGULANT THERAPY: Primary | ICD-10-CM

## 2022-07-01 PROCEDURE — 93793 ANTICOAG MGMT PT WARFARIN: CPT | Mod: ,,,

## 2022-07-01 PROCEDURE — 93793 PR ANTICOAGULANT MGMT FOR PT TAKING WARFARIN: ICD-10-PCS | Mod: ,,,

## 2022-07-14 NOTE — PROGRESS NOTES
[facility] staff verified they never received 7/12/22 blood specimen from [facility] that is to send the specimen to [facility].      Patient verified he did go to [facility] in [place] [phone]) on 7/12/22 to get INR drawn and if needed he can return to going to [facility] for INRs as [facility] not working out to get INR results.  Patient prefers [facility] as it is close in distance to his residence.    I spoke to EMMANUEL Ordonez at [facility] and she says that [facility] has the specimen.  Related to the issues involving patient going to [facility] it is in the best interest of getting results that the patient go to [facility] for his INRs.    I spoke again to the patient and rescheduled him for [facility] for INR on 7/18/22 and this will now be his location to go to which is further from his home.  I typed in description box requesting Pharmacist to extend INRs, when applicable, as much as possible since patient is now having to go to [facility].  I faxed STAT INR standing order to [facility].  I did discuss INR meter set up with patient as an alternative solution and he declined meter.

## 2022-07-19 ENCOUNTER — ANTI-COAG VISIT (OUTPATIENT)
Dept: CARDIOLOGY | Facility: CLINIC | Age: 82
End: 2022-07-19
Payer: MEDICARE

## 2022-07-19 DIAGNOSIS — Z79.01 LONG TERM CURRENT USE OF ANTICOAGULANT THERAPY: Primary | ICD-10-CM

## 2022-07-19 LAB — INR PPP: 1.12

## 2022-07-19 PROCEDURE — 93793 ANTICOAG MGMT PT WARFARIN: CPT | Mod: S$GLB,,,

## 2022-07-19 PROCEDURE — 93793 PR ANTICOAGULANT MGMT FOR PT TAKING WARFARIN: ICD-10-PCS | Mod: S$GLB,,,

## 2022-07-19 NOTE — PROGRESS NOTES
Verbal result taken from Dlema ETIENNE at [facility]    PT/INR 1.12  Date drawn 7/19/22  Hardcopy to be faxed

## 2022-07-28 ENCOUNTER — PATIENT MESSAGE (OUTPATIENT)
Dept: CARDIOLOGY | Facility: CLINIC | Age: 82
End: 2022-07-28
Payer: MEDICARE

## 2022-08-04 ENCOUNTER — ANTI-COAG VISIT (OUTPATIENT)
Dept: CARDIOLOGY | Facility: CLINIC | Age: 82
End: 2022-08-04
Payer: MEDICARE

## 2022-08-04 DIAGNOSIS — Z79.01 LONG TERM CURRENT USE OF ANTICOAGULANT THERAPY: Primary | ICD-10-CM

## 2022-08-04 LAB — INR PPP: 1.38

## 2022-08-04 PROCEDURE — 93793 PR ANTICOAGULANT MGMT FOR PT TAKING WARFARIN: ICD-10-PCS | Mod: S$GLB,,,

## 2022-08-04 PROCEDURE — 93793 ANTICOAG MGMT PT WARFARIN: CPT | Mod: S$GLB,,,

## 2022-08-04 NOTE — PROGRESS NOTES
INR not at goal. Medications, chart, and patient findings reviewed. See calendar for adjustments to dose and follow up plan.  Unsure if pt needs another dosing adjustment at this time due to challenges w/ compliance.  See findings.  Will re-assess.

## 2022-08-05 ENCOUNTER — NURSE TRIAGE (OUTPATIENT)
Dept: ADMINISTRATIVE | Facility: CLINIC | Age: 82
End: 2022-08-05
Payer: MEDICARE

## 2022-08-05 NOTE — TELEPHONE ENCOUNTER
"CallerPilar, pt's sister in law, states pt is "acting up really bad". Has upcoming appt with psychiatry 11/28, but doesn't feel that pt can make it until then. States, "He's senile or something. We can't trust him. He's sayin and doin all kind of stuff. Cursing his wife out. Pointing and calling names. His voice is getting louder". Caller is in fear that pt may cause harm to others, primarily his wife, in the home.   Care advice provided per protocol, with recommendation for pt to go to ED at this time. Further recommendation that if unable to transport pt, call 911 for EMS transport. Caller VU  Reason for Disposition   Patient is extremely upset (e.g., can't be calmed down)    Additional Information   Negative: Physical violence occurring now   Negative: Someone is threatening violence now   Negative: Sounds like a life-threatening emergency to the triager   Negative: Injuries needing medical treatment  (e.g., broken bones, lacerations, head injuries)    Protocols used: DOMESTIC [id number]      "

## 2022-08-10 ENCOUNTER — ANTI-COAG VISIT (OUTPATIENT)
Dept: CARDIOLOGY | Facility: CLINIC | Age: 82
End: 2022-08-10
Payer: MEDICARE

## 2022-08-10 DIAGNOSIS — Z79.01 LONG TERM CURRENT USE OF ANTICOAGULANT THERAPY: Primary | ICD-10-CM

## 2022-08-10 LAB — INR PPP: 1.77

## 2022-08-10 PROCEDURE — 93793 PR ANTICOAGULANT MGMT FOR PT TAKING WARFARIN: ICD-10-PCS | Mod: S$GLB,,,

## 2022-08-10 PROCEDURE — 93793 ANTICOAG MGMT PT WARFARIN: CPT | Mod: S$GLB,,,

## 2022-08-10 NOTE — PROGRESS NOTES
INR not at goal. Medications, chart, and patient findings reviewed. See calendar for adjustments to dose and follow up plan.  Will stress compliance.  Still unsure if current dosing will put pt WNL.

## 2022-08-18 ENCOUNTER — ANTI-COAG VISIT (OUTPATIENT)
Dept: CARDIOLOGY | Facility: CLINIC | Age: 82
End: 2022-08-18
Payer: MEDICARE

## 2022-08-18 DIAGNOSIS — Z79.01 LONG TERM CURRENT USE OF ANTICOAGULANT THERAPY: Primary | ICD-10-CM

## 2022-08-18 LAB — INR PPP: 2.47

## 2022-08-18 PROCEDURE — 93793 ANTICOAG MGMT PT WARFARIN: CPT | Mod: S$GLB,,,

## 2022-08-18 PROCEDURE — 93793 PR ANTICOAGULANT MGMT FOR PT TAKING WARFARIN: ICD-10-PCS | Mod: S$GLB,,,

## 2022-09-14 ENCOUNTER — ANTI-COAG VISIT (OUTPATIENT)
Dept: CARDIOLOGY | Facility: CLINIC | Age: 82
End: 2022-09-14
Payer: MEDICARE

## 2022-09-14 DIAGNOSIS — Z79.01 LONG TERM CURRENT USE OF ANTICOAGULANT THERAPY: Primary | ICD-10-CM

## 2022-09-14 LAB — INR PPP: 2.4

## 2022-09-14 PROCEDURE — 93793 ANTICOAG MGMT PT WARFARIN: CPT | Mod: S$GLB,,,

## 2022-09-14 PROCEDURE — 93793 PR ANTICOAGULANT MGMT FOR PT TAKING WARFARIN: ICD-10-PCS | Mod: S$GLB,,,

## 2022-09-22 ENCOUNTER — HOSPITAL ENCOUNTER (OUTPATIENT)
Facility: HOSPITAL | Age: 82
Discharge: PSYCHIATRIC OR SUBSTANCE USE FACILITY | End: 2022-09-23
Attending: EMERGENCY MEDICINE | Admitting: HOSPITALIST
Payer: MEDICARE

## 2022-09-22 DIAGNOSIS — I50.9 CHF (CONGESTIVE HEART FAILURE): ICD-10-CM

## 2022-09-22 DIAGNOSIS — R00.0 TACHYCARDIA: ICD-10-CM

## 2022-09-22 DIAGNOSIS — R07.9 CHEST PAIN: ICD-10-CM

## 2022-09-22 DIAGNOSIS — I49.9 ARRHYTHMIA: ICD-10-CM

## 2022-09-22 DIAGNOSIS — I48.91 ATRIAL FIBRILLATION WITH RVR: Primary | ICD-10-CM

## 2022-09-22 DIAGNOSIS — R09.02 HYPOXIA: ICD-10-CM

## 2022-09-22 PROBLEM — I95.9 HYPOTENSION: Status: ACTIVE | Noted: 2022-09-22

## 2022-09-22 PROBLEM — E87.20 LACTIC ACIDOSIS: Status: ACTIVE | Noted: 2022-09-22

## 2022-09-22 PROBLEM — J96.01 ACUTE HYPOXEMIC RESPIRATORY FAILURE: Status: ACTIVE | Noted: 2022-09-22

## 2022-09-22 LAB
ALBUMIN SERPL BCP-MCNC: 4 G/DL (ref 3.5–5.2)
ALP SERPL-CCNC: 88 U/L (ref 55–135)
ALT SERPL W/O P-5'-P-CCNC: 13 U/L (ref 10–44)
ANION GAP SERPL CALC-SCNC: 11 MMOL/L (ref 8–16)
AST SERPL-CCNC: 24 U/L (ref 10–40)
BASOPHILS # BLD AUTO: 0.02 K/UL (ref 0–0.2)
BASOPHILS NFR BLD: 0.5 % (ref 0–1.9)
BILIRUB SERPL-MCNC: 1.3 MG/DL (ref 0.1–1)
BILIRUB UR QL STRIP: NEGATIVE
BNP SERPL-MCNC: 144 PG/ML (ref 0–99)
BUN SERPL-MCNC: 13 MG/DL (ref 8–23)
CALCIUM SERPL-MCNC: 9.3 MG/DL (ref 8.7–10.5)
CHLORIDE SERPL-SCNC: 101 MMOL/L (ref 95–110)
CLARITY UR: CLEAR
CO2 SERPL-SCNC: 30 MMOL/L (ref 23–29)
COLOR UR: YELLOW
CREAT SERPL-MCNC: 1.2 MG/DL (ref 0.5–1.4)
DIFFERENTIAL METHOD: ABNORMAL
EOSINOPHIL # BLD AUTO: 0 K/UL (ref 0–0.5)
EOSINOPHIL NFR BLD: 0.9 % (ref 0–8)
ERYTHROCYTE [DISTWIDTH] IN BLOOD BY AUTOMATED COUNT: 13.4 % (ref 11.5–14.5)
EST. GFR  (NO RACE VARIABLE): >60 ML/MIN/1.73 M^2
GLUCOSE SERPL-MCNC: 123 MG/DL (ref 70–110)
GLUCOSE UR QL STRIP: NEGATIVE
HCT VFR BLD AUTO: 44.8 % (ref 40–54)
HGB BLD-MCNC: 14.9 G/DL (ref 14–18)
HGB UR QL STRIP: NEGATIVE
IMM GRANULOCYTES # BLD AUTO: 0.02 K/UL (ref 0–0.04)
IMM GRANULOCYTES NFR BLD AUTO: 0.5 % (ref 0–0.5)
INR PPP: 2.1
KETONES UR QL STRIP: NEGATIVE
LACTATE SERPL-SCNC: 2.7 MMOL/L (ref 0.5–1.9)
LACTATE SERPL-SCNC: 2.7 MMOL/L (ref 0.5–1.9)
LEUKOCYTE ESTERASE UR QL STRIP: NEGATIVE
LYMPHOCYTES # BLD AUTO: 0.7 K/UL (ref 1–4.8)
LYMPHOCYTES NFR BLD: 17.4 % (ref 18–48)
MAGNESIUM SERPL-MCNC: 1.9 MG/DL (ref 1.6–2.6)
MCH RBC QN AUTO: 30.8 PG (ref 27–31)
MCHC RBC AUTO-ENTMCNC: 33.3 G/DL (ref 32–36)
MCV RBC AUTO: 93 FL (ref 82–98)
MONOCYTES # BLD AUTO: 0.4 K/UL (ref 0.3–1)
MONOCYTES NFR BLD: 9.2 % (ref 4–15)
NEUTROPHILS # BLD AUTO: 3 K/UL (ref 1.8–7.7)
NEUTROPHILS NFR BLD: 71.5 % (ref 38–73)
NITRITE UR QL STRIP: NEGATIVE
NRBC BLD-RTO: 0 /100 WBC
PH UR STRIP: 8 [PH] (ref 5–8)
PLATELET # BLD AUTO: 144 K/UL (ref 150–450)
PMV BLD AUTO: 10.3 FL (ref 9.2–12.9)
POTASSIUM SERPL-SCNC: 3.6 MMOL/L (ref 3.5–5.1)
PROCALCITONIN SERPL IA-MCNC: <0.05 NG/ML (ref 0–0.5)
PROT SERPL-MCNC: 7.3 G/DL (ref 6–8.4)
PROT UR QL STRIP: NEGATIVE
PROTHROMBIN TIME: 22.3 SEC (ref 11.4–13.7)
RBC # BLD AUTO: 4.84 M/UL (ref 4.6–6.2)
SARS-COV-2 RDRP RESP QL NAA+PROBE: NEGATIVE
SODIUM SERPL-SCNC: 142 MMOL/L (ref 136–145)
SP GR UR STRIP: 1.01 (ref 1–1.03)
TROPONIN I SERPL DL<=0.01 NG/ML-MCNC: <0.03 NG/ML
TSH SERPL DL<=0.005 MIU/L-ACNC: 2.37 UIU/ML (ref 0.34–5.6)
URN SPEC COLLECT METH UR: NORMAL
UROBILINOGEN UR STRIP-ACNC: NEGATIVE EU/DL
WBC # BLD AUTO: 4.25 K/UL (ref 3.9–12.7)

## 2022-09-22 PROCEDURE — 84145 PROCALCITONIN (PCT): CPT | Performed by: EMERGENCY MEDICINE

## 2022-09-22 PROCEDURE — 63600175 PHARM REV CODE 636 W HCPCS: Performed by: EMERGENCY MEDICINE

## 2022-09-22 PROCEDURE — U0002 COVID-19 LAB TEST NON-CDC: HCPCS | Performed by: EMERGENCY MEDICINE

## 2022-09-22 PROCEDURE — 93005 ELECTROCARDIOGRAM TRACING: CPT | Performed by: INTERNAL MEDICINE

## 2022-09-22 PROCEDURE — 25000003 PHARM REV CODE 250: Performed by: EMERGENCY MEDICINE

## 2022-09-22 PROCEDURE — 80053 COMPREHEN METABOLIC PANEL: CPT | Performed by: EMERGENCY MEDICINE

## 2022-09-22 PROCEDURE — 36415 COLL VENOUS BLD VENIPUNCTURE: CPT | Performed by: EMERGENCY MEDICINE

## 2022-09-22 PROCEDURE — G0378 HOSPITAL OBSERVATION PER HR: HCPCS

## 2022-09-22 PROCEDURE — 87040 BLOOD CULTURE FOR BACTERIA: CPT | Performed by: EMERGENCY MEDICINE

## 2022-09-22 PROCEDURE — 81003 URINALYSIS AUTO W/O SCOPE: CPT | Performed by: HOSPITALIST

## 2022-09-22 PROCEDURE — 84443 ASSAY THYROID STIM HORMONE: CPT | Performed by: EMERGENCY MEDICINE

## 2022-09-22 PROCEDURE — 83880 ASSAY OF NATRIURETIC PEPTIDE: CPT | Performed by: EMERGENCY MEDICINE

## 2022-09-22 PROCEDURE — 83605 ASSAY OF LACTIC ACID: CPT | Performed by: EMERGENCY MEDICINE

## 2022-09-22 PROCEDURE — 96374 THER/PROPH/DIAG INJ IV PUSH: CPT

## 2022-09-22 PROCEDURE — 99285 EMERGENCY DEPT VISIT HI MDM: CPT | Mod: 25,CS

## 2022-09-22 PROCEDURE — G0378 HOSPITAL OBSERVATION PER HR: HCPCS | Mod: CS

## 2022-09-22 PROCEDURE — 25000003 PHARM REV CODE 250: Performed by: HOSPITALIST

## 2022-09-22 PROCEDURE — 85025 COMPLETE CBC W/AUTO DIFF WBC: CPT | Performed by: EMERGENCY MEDICINE

## 2022-09-22 PROCEDURE — 85610 PROTHROMBIN TIME: CPT | Performed by: EMERGENCY MEDICINE

## 2022-09-22 PROCEDURE — 96361 HYDRATE IV INFUSION ADD-ON: CPT

## 2022-09-22 PROCEDURE — 93010 EKG 12-LEAD: ICD-10-PCS | Mod: ,,, | Performed by: INTERNAL MEDICINE

## 2022-09-22 PROCEDURE — 83735 ASSAY OF MAGNESIUM: CPT | Performed by: EMERGENCY MEDICINE

## 2022-09-22 PROCEDURE — 83605 ASSAY OF LACTIC ACID: CPT | Mod: 91 | Performed by: HOSPITALIST

## 2022-09-22 PROCEDURE — 84484 ASSAY OF TROPONIN QUANT: CPT | Performed by: EMERGENCY MEDICINE

## 2022-09-22 PROCEDURE — 93010 ELECTROCARDIOGRAM REPORT: CPT | Mod: 76,,, | Performed by: INTERNAL MEDICINE

## 2022-09-22 RX ORDER — CITALOPRAM 10 MG/1
10 TABLET ORAL DAILY
COMMUNITY
Start: 2022-09-20 | End: 2023-03-20 | Stop reason: DRUGHIGH

## 2022-09-22 RX ORDER — IBUPROFEN 200 MG
16 TABLET ORAL
Status: DISCONTINUED | OUTPATIENT
Start: 2022-09-22 | End: 2022-09-24 | Stop reason: HOSPADM

## 2022-09-22 RX ORDER — DONEPEZIL HYDROCHLORIDE 5 MG/1
5 TABLET, FILM COATED ORAL DAILY
COMMUNITY
Start: 2022-07-01 | End: 2023-03-20

## 2022-09-22 RX ORDER — CLONIDINE HYDROCHLORIDE 0.1 MG/1
0.1 TABLET ORAL EVERY 6 HOURS PRN
COMMUNITY
End: 2023-03-20

## 2022-09-22 RX ORDER — SIMETHICONE 80 MG
1 TABLET,CHEWABLE ORAL 4 TIMES DAILY PRN
Status: DISCONTINUED | OUTPATIENT
Start: 2022-09-22 | End: 2022-09-24 | Stop reason: HOSPADM

## 2022-09-22 RX ORDER — SILDENAFIL 50 MG/1
50 TABLET, FILM COATED ORAL DAILY PRN
COMMUNITY
Start: 2022-06-01 | End: 2022-09-22

## 2022-09-22 RX ORDER — LOSARTAN POTASSIUM 50 MG/1
50 TABLET ORAL DAILY
Status: DISCONTINUED | OUTPATIENT
Start: 2022-09-22 | End: 2022-09-24 | Stop reason: HOSPADM

## 2022-09-22 RX ORDER — HYDROXYZINE PAMOATE 50 MG/1
25 CAPSULE ORAL EVERY 8 HOURS PRN
COMMUNITY
End: 2023-03-20

## 2022-09-22 RX ORDER — ASPIRIN 81 MG/1
81 TABLET ORAL DAILY
Status: DISCONTINUED | OUTPATIENT
Start: 2022-09-22 | End: 2022-09-24 | Stop reason: HOSPADM

## 2022-09-22 RX ORDER — SODIUM CHLORIDE 0.9 % (FLUSH) 0.9 %
10 SYRINGE (ML) INJECTION EVERY 12 HOURS PRN
Status: DISCONTINUED | OUTPATIENT
Start: 2022-09-22 | End: 2022-09-24 | Stop reason: HOSPADM

## 2022-09-22 RX ORDER — GLUCAGON 1 MG
1 KIT INJECTION
Status: DISCONTINUED | OUTPATIENT
Start: 2022-09-22 | End: 2022-09-24 | Stop reason: HOSPADM

## 2022-09-22 RX ORDER — NALOXONE HCL 0.4 MG/ML
0.02 VIAL (ML) INJECTION
Status: DISCONTINUED | OUTPATIENT
Start: 2022-09-22 | End: 2022-09-24 | Stop reason: HOSPADM

## 2022-09-22 RX ORDER — ACETAMINOPHEN 325 MG/1
650 TABLET ORAL EVERY 6 HOURS PRN
COMMUNITY

## 2022-09-22 RX ORDER — DONEPEZIL HYDROCHLORIDE 5 MG/1
5 TABLET, FILM COATED ORAL DAILY
Status: DISCONTINUED | OUTPATIENT
Start: 2022-09-22 | End: 2022-09-24 | Stop reason: HOSPADM

## 2022-09-22 RX ORDER — HYDROXYZINE PAMOATE 25 MG/1
25 CAPSULE ORAL EVERY 8 HOURS PRN
Status: DISCONTINUED | OUTPATIENT
Start: 2022-09-22 | End: 2022-09-24 | Stop reason: HOSPADM

## 2022-09-22 RX ORDER — FINASTERIDE 5 MG/1
5 TABLET, FILM COATED ORAL DAILY
Status: DISCONTINUED | OUTPATIENT
Start: 2022-09-22 | End: 2022-09-24 | Stop reason: HOSPADM

## 2022-09-22 RX ORDER — ONDANSETRON 4 MG/1
4 TABLET, FILM COATED ORAL EVERY 6 HOURS PRN
COMMUNITY
End: 2023-03-20

## 2022-09-22 RX ORDER — ACETAMINOPHEN 325 MG/1
650 TABLET ORAL EVERY 4 HOURS PRN
Status: DISCONTINUED | OUTPATIENT
Start: 2022-09-22 | End: 2022-09-24 | Stop reason: HOSPADM

## 2022-09-22 RX ORDER — ATORVASTATIN CALCIUM 40 MG/1
40 TABLET, FILM COATED ORAL NIGHTLY
Status: DISCONTINUED | OUTPATIENT
Start: 2022-09-22 | End: 2022-09-24 | Stop reason: HOSPADM

## 2022-09-22 RX ORDER — IBUPROFEN 200 MG
400 TABLET ORAL EVERY 6 HOURS PRN
Status: ON HOLD | COMMUNITY
End: 2023-03-21

## 2022-09-22 RX ORDER — GABAPENTIN 100 MG/1
100 CAPSULE ORAL 3 TIMES DAILY
Status: DISCONTINUED | OUTPATIENT
Start: 2022-09-22 | End: 2022-09-24 | Stop reason: HOSPADM

## 2022-09-22 RX ORDER — LOPERAMIDE HYDROCHLORIDE 2 MG/1
2 CAPSULE ORAL 4 TIMES DAILY PRN
COMMUNITY
End: 2023-03-20

## 2022-09-22 RX ORDER — MAG HYDROX/ALUMINUM HYD/SIMETH 200-200-20
30 SUSPENSION, ORAL (FINAL DOSE FORM) ORAL
COMMUNITY
End: 2023-03-20

## 2022-09-22 RX ORDER — IBUPROFEN 200 MG
24 TABLET ORAL
Status: DISCONTINUED | OUTPATIENT
Start: 2022-09-22 | End: 2022-09-24 | Stop reason: HOSPADM

## 2022-09-22 RX ORDER — ADHESIVE BANDAGE
30 BANDAGE TOPICAL DAILY PRN
COMMUNITY
End: 2023-03-20

## 2022-09-22 RX ORDER — METOPROLOL TARTRATE 25 MG/1
25 TABLET, FILM COATED ORAL 2 TIMES DAILY
Status: DISCONTINUED | OUTPATIENT
Start: 2022-09-22 | End: 2022-09-24 | Stop reason: HOSPADM

## 2022-09-22 RX ORDER — LABETALOL HYDROCHLORIDE 5 MG/ML
2.5 INJECTION, SOLUTION INTRAVENOUS ONCE
Status: COMPLETED | OUTPATIENT
Start: 2022-09-22 | End: 2022-09-22

## 2022-09-22 RX ORDER — CITALOPRAM 10 MG/1
10 TABLET ORAL DAILY
Status: DISCONTINUED | OUTPATIENT
Start: 2022-09-22 | End: 2022-09-24 | Stop reason: HOSPADM

## 2022-09-22 RX ADMIN — WARFARIN SODIUM 8 MG: 5 TABLET ORAL at 04:09

## 2022-09-22 RX ADMIN — GABAPENTIN 100 MG: 100 CAPSULE ORAL at 08:09

## 2022-09-22 RX ADMIN — CITALOPRAM HYDROBROMIDE 10 MG: 10 TABLET ORAL at 02:09

## 2022-09-22 RX ADMIN — DONEPEZIL HYDROCHLORIDE 5 MG: 5 TABLET, FILM COATED ORAL at 02:09

## 2022-09-22 RX ADMIN — GABAPENTIN 100 MG: 100 CAPSULE ORAL at 02:09

## 2022-09-22 RX ADMIN — SODIUM CHLORIDE, SODIUM LACTATE, POTASSIUM CHLORIDE, AND CALCIUM CHLORIDE 1178 ML: .6; .31; .03; .02 INJECTION, SOLUTION INTRAVENOUS at 11:09

## 2022-09-22 RX ADMIN — LABETALOL HYDROCHLORIDE 2.5 MG: 5 INJECTION, SOLUTION INTRAVENOUS at 02:09

## 2022-09-22 RX ADMIN — LOSARTAN POTASSIUM 50 MG: 50 TABLET, FILM COATED ORAL at 02:09

## 2022-09-22 RX ADMIN — FINASTERIDE 5 MG: 5 TABLET, FILM COATED ORAL at 02:09

## 2022-09-22 RX ADMIN — ASPIRIN 81 MG: 81 TABLET, COATED ORAL at 02:09

## 2022-09-22 RX ADMIN — ATORVASTATIN CALCIUM 40 MG: 40 TABLET, FILM COATED ORAL at 08:09

## 2022-09-22 RX ADMIN — SODIUM CHLORIDE 500 ML: 0.9 INJECTION, SOLUTION INTRAVENOUS at 09:09

## 2022-09-22 RX ADMIN — METOPROLOL TARTRATE 25 MG: 25 TABLET, FILM COATED ORAL at 08:09

## 2022-09-22 NOTE — ED PROVIDER NOTES
Encounter Date: 2022       History     Chief Complaint   Patient presents with    Hypotension     [facility]    Mental Health Problem     SUICIDAL- STATES WANTS TO DIE.      82-year-old male presents complaining of weakness, hypotension, patient found to be hypoxic by EMS and started on oxygen, patient is an admitted patient at [facility] they report that patient has a known history of atrial fibrillation hypertension and hyperlipidemia however patient develops new hypotension.  Patient denies recent illness patient does report that he has suicidal thoughts but he does not have suicidal intent at this time patient remains under pec from [facility] patient is here for evaluation of his hypotension and hypoxia.  On ambulation patient noted to have oxygen desaturation to 86%.  Patient noted to be in atrial fibrillation at this time.    Review of patient's allergies indicates:  No Known Allergies  Past Medical History:   Diagnosis Date    Atrial fibrillation     Gout     Hyperlipidemia     Hypertension      Past Surgical History:   Procedure Laterality Date    CARDIOVERSION  3/15/16    SOPHIA prior     No family history on file.  Social History     Tobacco Use    Smoking status: Former     Types: Cigarettes     Start date: 1950     Quit date: 1970     Years since quittin.7    Smokeless tobacco: Never   Substance Use Topics    Alcohol use: Yes     Alcohol/week: 2.0 standard drinks     Types: 2 Cans of beer per week    Drug use: No     Review of Systems   Constitutional:  Negative for fever.   HENT:  Negative for congestion, rhinorrhea, sore throat and trouble swallowing.    Eyes:  Negative for visual disturbance.   Respiratory:  Negative for cough, chest tightness, shortness of breath and wheezing.    Cardiovascular:  Positive for palpitations. Negative for chest pain and leg swelling.   Gastrointestinal:  Negative for abdominal distention, abdominal pain,  constipation, diarrhea, nausea and vomiting.   Genitourinary:  Negative for difficulty urinating, dysuria, flank pain and frequency.   Musculoskeletal:  Negative for arthralgias, back pain, joint swelling and neck pain.   Skin:  Negative for color change and rash.   Neurological:  Negative for dizziness, syncope, speech difficulty, weakness, numbness and headaches.   Psychiatric/Behavioral:  Positive for suicidal ideas.    All other systems reviewed and are negative.    Physical Exam     Initial Vitals [09/22/22 0912]   BP Pulse Resp Temp SpO2   129/76 (!) 136 19 97.8 °F (36.6 °C) 98 %      MAP       --         Physical Exam    Nursing note and vitals reviewed.  Constitutional: He appears well-developed and well-nourished. He is not diaphoretic. No distress.   HENT:   Head: Normocephalic and atraumatic.   Right Ear: External ear normal.   Left Ear: External ear normal.   Nose: Nose normal.   Mouth/Throat: Oropharynx is clear and moist. No oropharyngeal exudate.   Eyes: Conjunctivae and EOM are normal. Pupils are equal, round, and reactive to light. Right eye exhibits no discharge. Left eye exhibits no discharge. No scleral icterus.   Neck: Neck supple. No thyromegaly present. No tracheal deviation present. No JVD present.   Normal range of motion.  Cardiovascular:  Normal heart sounds and intact distal pulses.     Exam reveals no gallop and no friction rub.       No murmur heard.  Irregularly irregular rhythm   Pulmonary/Chest: Breath sounds normal. No stridor. No respiratory distress. He has no wheezes. He has no rhonchi. He has no rales. He exhibits no tenderness.   Abdominal: Abdomen is soft. Bowel sounds are normal. He exhibits no distension and no mass. There is no abdominal tenderness. There is no rebound and no guarding.   Musculoskeletal:         General: No tenderness or edema. Normal range of motion.      Cervical back: Normal range of motion and neck supple.     Lymphadenopathy:     He has no cervical  adenopathy.   Neurological: He is alert and oriented to person, place, and time. He has normal strength. No cranial nerve deficit or sensory deficit.   Skin: Skin is warm and dry. No rash noted. No erythema.       ED Course   Procedures  Labs Reviewed   B-TYPE NATRIURETIC PEPTIDE - Abnormal; Notable for the following components:       Result Value     (*)     All other components within normal limits   CBC W/ AUTO DIFFERENTIAL - Abnormal; Notable for the following components:    Platelets 144 (*)     Lymph # 0.7 (*)     Lymph % 17.4 (*)     All other components within normal limits   COMPREHENSIVE METABOLIC PANEL - Abnormal; Notable for the following components:    CO2 30 (*)     Glucose 123 (*)     Total Bilirubin 1.3 (*)     All other components within normal limits   LACTIC ACID, PLASMA - Abnormal; Notable for the following components:    Lactate (Lactic Acid) 2.7 (*)     All other components within normal limits    Narrative:     LA critical result(s) called and verbal readback obtained from Dr. Browne by [facility] 09/22/2022 10:59   PROTIME-INR - Abnormal; Notable for the following components:    PT 22.3 (*)     All other components within normal limits   LACTIC ACID, PLASMA - Abnormal; Notable for the following components:    Lactate (Lactic Acid) 2.7 (*)     All other components within normal limits    Narrative:     LA critical result(s) called and verbal readback obtained from   Chichi Vegas RN by [facility] 09/22/2022 15:27   CULTURE, BLOOD   CULTURE, BLOOD   SARS-COV-2 RNA AMPLIFICATION, QUAL   MAGNESIUM   TROPONIN I   TSH   URINALYSIS, REFLEX TO URINE CULTURE    Narrative:     Specimen Source->Urine   PROCALCITONIN   PROCALCITONIN        ECG Results              EKG 12-lead (In process)  Result time 09/22/22 12:47:22      In process by Interface, Lab In [facility] (09/22/22 12:47:22)                   Narrative:    Test Reason : I49.9,    Vent. Rate : 073 BPM     Atrial Rate : 073 BPM     P-R Int : 152 ms           QRS Dur : 076 ms      QT Int : 440 ms       P-R-T Axes : 059 -35 -05 degrees     QTc Int : 484 ms    Normal sinus rhythm  Left axis deviation  Voltage criteria for left ventricular hypertrophy  Nonspecific ST abnormality  Prolonged QT  Abnormal ECG  When compared with ECG of 22-SEP-2022 09:32,  Sinus rhythm has replaced Atrial fibrillation  Vent. rate has decreased BY  41 BPM  T wave inversion now evident in Inferior leads    Referred By: ARMANDO   SELF           Confirmed By:                                      EKG 12-lead (In process)  Result time 09/22/22 10:58:57      In process by Interface, Lab In [facility] (09/22/22 10:58:57)                   Narrative:    Test Reason : R00.0,    Vent. Rate : 114 BPM     Atrial Rate : 241 BPM     P-R Int : 000 ms          QRS Dur : 098 ms      QT Int : 356 ms       P-R-T Axes : 000 -25 064 degrees     QTc Int : 490 ms    Atrial fibrillation with rapid ventricular response with premature  ventricular or aberrantly conducted complexes  Voltage criteria for left ventricular hypertrophy  Abnormal ECG  When compared with ECG of 08-JUL-2020 06:54,  Atrial fibrillation has replaced Sinus rhythm  T wave amplitude has decreased in Anterior leads    Referred By: ARMANDO   SELF           Confirmed By:                                   Imaging Results              X-Ray Chest AP Portable (Final result)  Result time 09/22/22 10:20:59      Final result by Yvon Petit MD (09/22/22 10:20:59)                   Narrative:    Chest single view    CLINICAL DATA: Palpitations. Comparison to 2016.    FINDINGS: Heart size is normal. The thoracic aorta is elongated. No infiltrates or effusions are identified. Osseous structures are unremarkable.    IMPRESSION:  1. No acute radiographic abnormalities.    Electronically signed by:  Yvon Petit MD  9/22/2022 10:20 AM CDT Workstation: [id number]                                     Medications   aspirin EC tablet 81 mg (81 mg  Oral Given 9/22/22 1449)   atorvastatin tablet 40 mg (has no administration in time range)   citalopram tablet 10 mg (10 mg Oral Given 9/22/22 1449)   donepeziL tablet 5 mg (5 mg Oral Given 9/22/22 1450)   finasteride tablet 5 mg (5 mg Oral Given 9/22/22 1450)   gabapentin capsule 100 mg (100 mg Oral Given 9/22/22 1449)   hydrOXYzine pamoate capsule 25 mg (has no administration in time range)   losartan tablet 50 mg (50 mg Oral Given 9/22/22 1449)   warfarin split tablet 8 mg (has no administration in time range)   sodium chloride 0.9% flush 10 mL (has no administration in time range)   naloxone 0.4 mg/mL injection 0.02 mg (has no administration in time range)   glucose chewable tablet 16 g (has no administration in time range)   glucose chewable tablet 24 g (has no administration in time range)   glucagon (human recombinant) injection 1 mg (has no administration in time range)   acetaminophen tablet 650 mg (has no administration in time range)   simethicone chewable tablet 80 mg (has no administration in time range)   sodium chloride 0.9% bolus 500 mL (0 mLs Intravenous Stopped 9/22/22 1115)   lactated ringers bolus 1,178 mL (0 mLs Intravenous Stopped 9/22/22 1409)   labetaloL injection 2.5 mg (2.5 mg Intravenous Given 9/22/22 1450)     Medical Decision Making:   History:   Old Medical Records: I decided to obtain old medical records.  Initial Assessment:   Emergent evaluation of an 82-year-old male presenting with hypoxia, hypotension patient is currently normotensive however patient noted to be in atrial fibrillation patient has a history of atrial fibrillation patient is currently on Coumadin patient is under care for suicidal ideation at [facility] patient noted however today to be hypoxic on room air indeed when patient was ambulated in the emergency department patient noted to have oxygen saturations dropping to 86%, patient also noted to have a decrease in his blood pressure on standing,  differential diagnosis includes dehydration, electrolyte abnormality, endocrine dysfunction, infection, ACS                        Clinical Impression:   Final diagnoses:  [R00.0] Tachycardia  [I48.91] Atrial fibrillation with RVR  [I49.9] Arrhythmia  [R09.02] Hypoxia (Primary)        ED Disposition Condition    Observation                 Ruben Browne MD  09/22/22 5540

## 2022-09-22 NOTE — H&P
[facility] Medicine  History & Physical    Patient Name: Bright Spain  MRN: [id number]  Patient Class: OP- Observation  Admission Date: 9/22/2022  Attending Physician: Dion Obregon MD   Primary Care Provider: Olivia Mckeon MD         Patient information was obtained from patient, spouse/SO, relative(s) and ER records.     Subjective:     Principal Problem:Atrial fibrillation with RVR    Chief Complaint:   Chief Complaint   Patient presents with    Hypotension     AFIB REPORTED PER [facility]    Mental Health Problem     SUICIDAL- STATES WANTS TO DIE.         HPI: 82-year-old gentleman with prior history of CAD, AFib on Coumadin, ischemic cardiomyopathy with EF of 25% in the past, hypertension, SVT sent from [facility] due to hypotension and hypoxia.  As per my report from ER provider [facility] and since morning he had multiple readings of low blood pressure, hypoxia and was found to have AFib RVR and was subsequently sent to emergency room.  Upon my interview patient is eating his lunch, denies any fever, chills, headache, dizziness, chest pain palpitations, nausea, vomiting, bladder or bowel symptoms.  Upon arrival to ED he was borderline hypoxic, hypotensive however responded well with IV fluids.  Found to have mild lactic acidosis likely due to hypoxia.  Hospital medicine was consulted for admission.       Past Medical History:   Diagnosis Date    Atrial fibrillation     Gout     Hyperlipidemia     Hypertension        Past Surgical History:   Procedure Laterality Date    CARDIOVERSION  3/15/16    SOPHIA prior       Review of patient's allergies indicates:  No Known Allergies    No current facility-administered medications on file prior to encounter.     Current Outpatient Medications on File Prior to Encounter   Medication Sig    acetaminophen (TYLENOL) 325 MG tablet Take 650 mg by mouth every 6 (six) hours as needed for Pain. Added by MAR  (Medication Administration Report)    allopurinol (ZYLOPRIM) 300 MG tablet Take 300 mg by mouth once daily.    aluminum-magnesium hydroxide-simethicone (MAALOX) 200-200-20 mg/5 mL Susp Take 30 mLs by mouth 4 (four) times daily before meals and nightly. Added by MAR (Medication Administration Report)    aspirin (ECOTRIN) 81 MG EC tablet Take 81 mg by mouth once daily.    atorvastatin (LIPITOR) 40 MG tablet Take 40 mg by mouth once daily.    citalopram (CELEXA) 10 MG tablet Take 10 mg by mouth once daily.    cloNIDine (CATAPRES) 0.1 MG tablet Take 0.1 mg by mouth every 6 (six) hours as needed. Added by MAR (Medication Administration Report)    donepeziL (ARICEPT) 5 MG tablet Take 5 mg by mouth once daily.    finasteride (PROSCAR) 5 mg tablet Take 5 mg by mouth once daily.    gabapentin (NEURONTIN) 100 MG capsule Take 100 mg by mouth 3 (three) times daily.    hydrOXYzine pamoate (VISTARIL) 50 MG Cap Take 25 mg by mouth every 8 (eight) hours as needed. Added  by patient's MAR (Medication Administration Report)    ibuprofen (ADVIL,MOTRIN) 200 MG tablet Take 400 mg by mouth every 6 (six) hours as needed for Pain. Added by MAR (Medication Administration Report)    loperamide (IMODIUM) 2 mg capsule Take 2 mg by mouth 4 (four) times daily as needed for Diarrhea. Added by MAR (Medication Administration Report)    losartan (COZAAR) 50 MG tablet Take 1 tablet (50 mg total) by mouth once daily.    magnesium hydroxide 400 mg/5 ml (MILK OF MAGNESIA) 400 mg/5 mL Susp Take 30 mLs by mouth daily as needed. Added by MAR (Medication Administration Report)    ondansetron (ZOFRAN) 4 MG tablet Take 4 mg by mouth every 6 (six) hours as needed for Nausea. Added by MAR (Medication Administration Report)    tamsulosin (FLOMAX) 0.4 mg Cp24 Take 0.4 mg by mouth once daily.    warfarin (COUMADIN) 4 MG tablet Take 2 tablets (8mg) PO Sunday, Tuesday, Thursday; then take 1.5 tablets (6mg) PO all other days as or as directed by  [facility]. (Patient taking differently: Take 8 mg by mouth Daily. Take 2 tablets (8mg) PO , Tuesday, Thursday; then take 1.5 tablets (6mg) PO all other days as or as directed by [facility]madin Clinic.)    nitroGLYCERIN (NITROSTAT) 0.4 MG SL tablet Place 1 tablet (0.4 mg total) under the tongue every 5 (five) minutes as needed for Chest pain.    [DISCONTINUED] multivitamin with minerals tablet Take 1 tablet by mouth once daily.    [DISCONTINUED] potassium chloride SA (K-DUR,KLOR-CON) 20 MEQ tablet Take 1 tablet (20 mEq total) by mouth once daily.    [DISCONTINUED] sildenafiL (VIAGRA) 50 MG tablet Take 50 mg by mouth daily as needed.     Family History    None       Tobacco Use    Smoking status: Former     Types: Cigarettes     Start date: 1950     Quit date: 1970     Years since quittin.7    Smokeless tobacco: Never   Substance and Sexual Activity    Alcohol use: Yes     Alcohol/week: 2.0 standard drinks     Types: 2 Cans of beer per week    Drug use: No    Sexual activity: Not on file     Review of Systems   Constitutional:  Negative for activity change and appetite change.   HENT:  Negative for congestion and sore throat.    Eyes:  Negative for discharge and visual disturbance.   Respiratory:  Negative for cough and chest tightness.    Cardiovascular:  Negative for chest pain and leg swelling.   Gastrointestinal:  Negative for abdominal pain and nausea.   Genitourinary:  Negative for dysuria and hematuria.   Musculoskeletal:  Positive for back pain. Negative for myalgias.   Skin:  Negative for pallor and rash.   Neurological:  Negative for dizziness and weakness.   Psychiatric/Behavioral:  Negative for behavioral problems. The patient is not nervous/anxious.    All other systems reviewed and are negative.  Objective:     Vital Signs (Most Recent):  Temp: 97.8 °F (36.6 °C) (22 0912)  Pulse: 61 (22 1230)  Resp: 17 (22 1230)  BP: (!) 168/101 (22 1230)  SpO2: 99 %  (09/22/22 1230)   Vital Signs (24h Range):  Temp:  [97.8 °F (36.6 °C)] 97.8 °F (36.6 °C)  Pulse:  [] 61  Resp:  [17-19] 17  SpO2:  [97 %-99 %] 99 %  BP: (103-168)/() 168/101     Weight: 72.6 kg (160 lb)  Body mass index is 31.25 kg/m².    Physical Exam  Vitals and nursing note reviewed.   Constitutional:       Appearance: He is well-developed.   HENT:      Head: Atraumatic.   Neck:      Vascular: No JVD.   Cardiovascular:      Rate and Rhythm: Tachycardia present. Rhythm irregular.      Heart sounds: Normal heart sounds. No murmur heard.    No gallop.   Pulmonary:      Effort: No respiratory distress.      Breath sounds: Normal breath sounds. No wheezing.   Abdominal:      General: Bowel sounds are normal. There is no distension.      Palpations: Abdomen is soft.      Tenderness: There is no guarding or rebound.   Musculoskeletal:         General: Normal range of motion.      Cervical back: Neck supple.   Lymphadenopathy:      Cervical: No cervical adenopathy.   Skin:     General: Skin is warm.      Capillary Refill: Capillary refill takes less than 2 seconds.      Findings: No rash.   Neurological:      Mental Status: He is alert and oriented to person, place, and time.      Cranial Nerves: No cranial nerve deficit.   Psychiatric:         Behavior: Behavior normal.           Significant Labs: All pertinent labs within the past 24 hours have been reviewed.    Significant Imaging: I have reviewed all pertinent imaging results/findings within the past 24 hours.    Assessment/Plan:     82-year-old gentleman with prior history of CAD, AFib on Coumadin, ischemic cardiomyopathy, hypertension sent from [facility] due to hypotension, hypoxia found to have AFib RVR, mild lactic acidosis    Active Hospital Problems    Diagnosis  POA    *Atrial fibrillation with RVR [I48.91]  Yes    Acute hypoxemic respiratory failure [J96.01]  Yes     Priority: 2     Lactic acidosis [E87.2]  Yes     Priority: 3      Hypotension [I95.9]  Yes     Priority: 4     Pulmonary HTN [I27.20]  Yes    Ischemic cardiomyopathy [I25.5]  Yes    Mitral regurgitation [I34.0]  Yes    SVT (supraventricular tachycardia) [I47.1]  Yes    Cardiomyopathy [I42.9]  Yes     EF 20-25%      With severe MR (new)-not seen 2/2016 and 3/2016      Essential hypertension [I10]  Yes    Long-term (current) use of anticoagulants [Z79.01]  Not Applicable     Dx updated per 2019 IMO Load        Resolved Hospital Problems   No resolved problems to display.       Plan:  --Admit to cardiology B-observation  --Overall presentation consistent with prolonged AFib RVR episode resulting into hypotension and hypoxia.  I do not suspect any infection/sepsis.  Lactic acidosis likely due to hypoxia  --Patient was given IV hydration and his blood pressure improved remarkably, during my interview he converted into sinus rhythm  --Stop IV fluids, repeat lactic acid in 4hrs for completeness sake  --Resume essential home medications including Coumadin  --Daily labs including INR  --2D echo as in the past patient had ischemic cardiomyopathy with EF of 20-25% with severe MR  --1:1 sitter  --Continue psych hold.  CEC was issued on 09/21/2022 at 8:10 p.m.  --Serial EKGs, cardiac enzymes  --Close monitoring of QT    VTE Risk Mitigation (From admission, onward)         Ordered     warfarin split tablet 8 mg  Daily         09/22/22 1328     IP VTE HIGH RISK PATIENT  Once         09/22/22 1327     Place sequential compression device  Until discontinued         09/22/22 1327     Reason for No Pharmacological VTE Prophylaxis  Once        Question:  Reasons:  Answer:  Already adequately anticoagulated on oral Anticoagulants    09/22/22 1327                   Dion Obregon MD  Department of Hospital Medicine   [facility] - Emergency Dept

## 2022-09-22 NOTE — HPI
82-year-old gentleman with prior history of CAD, AFib on Coumadin, ischemic cardiomyopathy with EF of 25% in the past, hypertension, SVT sent from [facility] due to hypotension and hypoxia.  As per my report from ER provider [facility] and since morning he had multiple readings of low blood pressure, hypoxia and was found to have AFib RVR and was subsequently sent to emergency room.  Upon my interview patient is eating his lunch, denies any fever, chills, headache, dizziness, chest pain palpitations, nausea, vomiting, bladder or bowel symptoms.  Upon arrival to ED he was borderline hypoxic, hypotensive however responded well with IV fluids.  Found to have mild lactic acidosis likely due to hypoxia.  Hospital medicine was consulted for admission.

## 2022-09-22 NOTE — ED NOTES
Security at bedside to wand pt. Pt under PEC from [facility]. Pt changed into purple gown. Environment clear and safe from harmful objects. Sitter placed at bedside for direct pt observation. Pt is calm and cooperative at this time. Restroom and comfort needs addressed. Pt denies pain or any needs at this time.

## 2022-09-22 NOTE — ED NOTES
Pt remains in purple gown. Environment remains clear and safe from harmful objects. Sitter remains at bedside for direct pt observation. Pt is calm and cooperative at this time. Family at bedside. Restroom and comfort needs addressed. Meal tray delivered to pt. Pt denies pain or any needs at this time.

## 2022-09-22 NOTE — ED NOTES
Pt remains in purple gown. Environment remains clear and safe from harmful objects. Sitter remains at bedside for direct pt observation. Pt is calm and cooperative at this time. Restroom and comfort needs addressed. Pt denies pain or any needs at this time.

## 2022-09-22 NOTE — SUBJECTIVE & OBJECTIVE
Past Medical History:   Diagnosis Date    Atrial fibrillation     Gout     Hyperlipidemia     Hypertension        Past Surgical History:   Procedure Laterality Date    CARDIOVERSION  3/15/16    SOPHIA prior       Review of patient's allergies indicates:  No Known Allergies    No current facility-administered medications on file prior to encounter.     Current Outpatient Medications on File Prior to Encounter   Medication Sig    acetaminophen (TYLENOL) 325 MG tablet Take 650 mg by mouth every 6 (six) hours as needed for Pain. Added by MAR (Medication Administration Report)    allopurinol (ZYLOPRIM) 300 MG tablet Take 300 mg by mouth once daily.    aluminum-magnesium hydroxide-simethicone (MAALOX) 200-200-20 mg/5 mL Susp Take 30 mLs by mouth 4 (four) times daily before meals and nightly. Added by MAR (Medication Administration Report)    aspirin (ECOTRIN) 81 MG EC tablet Take 81 mg by mouth once daily.    atorvastatin (LIPITOR) 40 MG tablet Take 40 mg by mouth once daily.    citalopram (CELEXA) 10 MG tablet Take 10 mg by mouth once daily.    cloNIDine (CATAPRES) 0.1 MG tablet Take 0.1 mg by mouth every 6 (six) hours as needed. Added by MAR (Medication Administration Report)    donepeziL (ARICEPT) 5 MG tablet Take 5 mg by mouth once daily.    finasteride (PROSCAR) 5 mg tablet Take 5 mg by mouth once daily.    gabapentin (NEURONTIN) 100 MG capsule Take 100 mg by mouth 3 (three) times daily.    hydrOXYzine pamoate (VISTARIL) 50 MG Cap Take 25 mg by mouth every 8 (eight) hours as needed. Added  by patient's MAR (Medication Administration Report)    ibuprofen (ADVIL,MOTRIN) 200 MG tablet Take 400 mg by mouth every 6 (six) hours as needed for Pain. Added by MAR (Medication Administration Report)    loperamide (IMODIUM) 2 mg capsule Take 2 mg by mouth 4 (four) times daily as needed for Diarrhea. Added by MAR (Medication Administration Report)    losartan (COZAAR) 50 MG tablet Take 1 tablet (50 mg total) by mouth once daily.     magnesium hydroxide 400 mg/5 ml (MILK OF MAGNESIA) 400 mg/5 mL Susp Take 30 mLs by mouth daily as needed. Added by MAR (Medication Administration Report)    ondansetron (ZOFRAN) 4 MG tablet Take 4 mg by mouth every 6 (six) hours as needed for Nausea. Added by MAR (Medication Administration Report)    tamsulosin (FLOMAX) 0.4 mg Cp24 Take 0.4 mg by mouth once daily.    warfarin (COUMADIN) 4 MG tablet Take 2 tablets (8mg) PO , Tuesday, Thursday; then take 1.5 tablets (6mg) PO all other days as or as directed by [facility]. (Patient taking differently: Take 8 mg by mouth Daily. Take 2 tablets (8mg) PO , Tuesday, Thursday; then take 1.5 tablets (6mg) PO all other days as or as directed by [facility].)    nitroGLYCERIN (NITROSTAT) 0.4 MG SL tablet Place 1 tablet (0.4 mg total) under the tongue every 5 (five) minutes as needed for Chest pain.    [DISCONTINUED] multivitamin with minerals tablet Take 1 tablet by mouth once daily.    [DISCONTINUED] potassium chloride SA (K-DUR,KLOR-CON) 20 MEQ tablet Take 1 tablet (20 mEq total) by mouth once daily.    [DISCONTINUED] sildenafiL (VIAGRA) 50 MG tablet Take 50 mg by mouth daily as needed.     Family History    None       Tobacco Use    Smoking status: Former     Types: Cigarettes     Start date: 1950     Quit date: 1970     Years since quittin.7    Smokeless tobacco: Never   Substance and Sexual Activity    Alcohol use: Yes     Alcohol/week: 2.0 standard drinks     Types: 2 Cans of beer per week    Drug use: No    Sexual activity: Not on file     Review of Systems   Constitutional:  Negative for activity change and appetite change.   HENT:  Negative for congestion and sore throat.    Eyes:  Negative for discharge and visual disturbance.   Respiratory:  Negative for cough and chest tightness.    Cardiovascular:  Negative for chest pain and leg swelling.   Gastrointestinal:  Negative for abdominal pain and nausea.   Genitourinary:  Negative for  dysuria and hematuria.   Musculoskeletal:  Positive for back pain. Negative for myalgias.   Skin:  Negative for pallor and rash.   Neurological:  Negative for dizziness and weakness.   Psychiatric/Behavioral:  Negative for behavioral problems. The patient is not nervous/anxious.    All other systems reviewed and are negative.  Objective:     Vital Signs (Most Recent):  Temp: 97.8 °F (36.6 °C) (09/22/22 0912)  Pulse: 61 (09/22/22 1230)  Resp: 17 (09/22/22 1230)  BP: (!) 168/101 (09/22/22 1230)  SpO2: 99 % (09/22/22 1230)   Vital Signs (24h Range):  Temp:  [97.8 °F (36.6 °C)] 97.8 °F (36.6 °C)  Pulse:  [] 61  Resp:  [17-19] 17  SpO2:  [97 %-99 %] 99 %  BP: (103-168)/() 168/101     Weight: 72.6 kg (160 lb)  Body mass index is 31.25 kg/m².    Physical Exam  Vitals and nursing note reviewed.   Constitutional:       Appearance: He is well-developed.   HENT:      Head: Atraumatic.   Neck:      Vascular: No JVD.   Cardiovascular:      Rate and Rhythm: Tachycardia present. Rhythm irregular.      Heart sounds: Normal heart sounds. No murmur heard.    No gallop.   Pulmonary:      Effort: No respiratory distress.      Breath sounds: Normal breath sounds. No wheezing.   Abdominal:      General: Bowel sounds are normal. There is no distension.      Palpations: Abdomen is soft.      Tenderness: There is no guarding or rebound.   Musculoskeletal:         General: Normal range of motion.      Cervical back: Neck supple.   Lymphadenopathy:      Cervical: No cervical adenopathy.   Skin:     General: Skin is warm.      Capillary Refill: Capillary refill takes less than 2 seconds.      Findings: No rash.   Neurological:      Mental Status: He is alert and oriented to person, place, and time.      Cranial Nerves: No cranial nerve deficit.   Psychiatric:         Behavior: Behavior normal.           Significant Labs: All pertinent labs within the past 24 hours have been reviewed.    Significant Imaging: I have reviewed all  pertinent imaging results/findings within the past 24 hours.

## 2022-09-22 NOTE — ED NOTES
Pt remains in purple gown. Environment remains clear and safe from harmful objects. Sitter remains at bedside for direct pt observation. Pt is calm and cooperative at this time. Family at bedside. Restroom and comfort needs addressed. Pt denies pain or any needs at this time.

## 2022-09-22 NOTE — ED NOTES
Report called to EMMANUEL Feldman. Pt stable and ready for transport. Security with pt during transport.

## 2022-09-23 ENCOUNTER — CLINICAL SUPPORT (OUTPATIENT)
Dept: CARDIOLOGY | Facility: HOSPITAL | Age: 82
End: 2022-09-23
Attending: HOSPITALIST
Payer: MEDICARE

## 2022-09-23 VITALS
DIASTOLIC BLOOD PRESSURE: 75 MMHG | BODY MASS INDEX: 31.41 KG/M2 | WEIGHT: 160 LBS | RESPIRATION RATE: 20 BRPM | HEIGHT: 60 IN | HEART RATE: 58 BPM | TEMPERATURE: 98 F | SYSTOLIC BLOOD PRESSURE: 136 MMHG | OXYGEN SATURATION: 100 %

## 2022-09-23 VITALS — HEIGHT: 60 IN | BODY MASS INDEX: 31.41 KG/M2 | WEIGHT: 160 LBS

## 2022-09-23 PROBLEM — J96.01 ACUTE HYPOXEMIC RESPIRATORY FAILURE: Status: RESOLVED | Noted: 2022-09-22 | Resolved: 2022-09-23

## 2022-09-23 PROBLEM — I95.9 HYPOTENSION: Status: RESOLVED | Noted: 2022-09-22 | Resolved: 2022-09-23

## 2022-09-23 PROBLEM — E87.20 LACTIC ACIDOSIS: Status: RESOLVED | Noted: 2022-09-22 | Resolved: 2022-09-23

## 2022-09-23 LAB
ALBUMIN SERPL BCP-MCNC: 3.2 G/DL (ref 3.5–5.2)
ALP SERPL-CCNC: 66 U/L (ref 55–135)
ALT SERPL W/O P-5'-P-CCNC: 13 U/L (ref 10–44)
ANION GAP SERPL CALC-SCNC: 5 MMOL/L (ref 8–16)
AST SERPL-CCNC: 20 U/L (ref 10–40)
BASOPHILS # BLD AUTO: 0.02 K/UL (ref 0–0.2)
BASOPHILS NFR BLD: 0.5 % (ref 0–1.9)
BILIRUB SERPL-MCNC: 0.8 MG/DL (ref 0.1–1)
BUN SERPL-MCNC: 12 MG/DL (ref 8–23)
CALCIUM SERPL-MCNC: 8.3 MG/DL (ref 8.7–10.5)
CHLORIDE SERPL-SCNC: 106 MMOL/L (ref 95–110)
CO2 SERPL-SCNC: 29 MMOL/L (ref 23–29)
CREAT SERPL-MCNC: 1.1 MG/DL (ref 0.5–1.4)
DIFFERENTIAL METHOD: ABNORMAL
EOSINOPHIL # BLD AUTO: 0.1 K/UL (ref 0–0.5)
EOSINOPHIL NFR BLD: 2.4 % (ref 0–8)
ERYTHROCYTE [DISTWIDTH] IN BLOOD BY AUTOMATED COUNT: 13.4 % (ref 11.5–14.5)
EST. GFR  (NO RACE VARIABLE): >60 ML/MIN/1.73 M^2
GLUCOSE SERPL-MCNC: 89 MG/DL (ref 70–110)
HCT VFR BLD AUTO: 39.2 % (ref 40–54)
HGB BLD-MCNC: 13.1 G/DL (ref 14–18)
IMM GRANULOCYTES # BLD AUTO: 0.01 K/UL (ref 0–0.04)
IMM GRANULOCYTES NFR BLD AUTO: 0.2 % (ref 0–0.5)
INR PPP: 2.3
LACTATE SERPL-SCNC: 1.5 MMOL/L (ref 0.5–1.9)
LYMPHOCYTES # BLD AUTO: 1.5 K/UL (ref 1–4.8)
LYMPHOCYTES NFR BLD: 35.5 % (ref 18–48)
MAGNESIUM SERPL-MCNC: 1.7 MG/DL (ref 1.6–2.6)
MCH RBC QN AUTO: 31.2 PG (ref 27–31)
MCHC RBC AUTO-ENTMCNC: 33.4 G/DL (ref 32–36)
MCV RBC AUTO: 93 FL (ref 82–98)
MONOCYTES # BLD AUTO: 0.5 K/UL (ref 0.3–1)
MONOCYTES NFR BLD: 10.9 % (ref 4–15)
NEUTROPHILS # BLD AUTO: 2.1 K/UL (ref 1.8–7.7)
NEUTROPHILS NFR BLD: 50.5 % (ref 38–73)
NRBC BLD-RTO: 0 /100 WBC
PHOSPHATE SERPL-MCNC: 2.5 MG/DL (ref 2.7–4.5)
PLATELET # BLD AUTO: 128 K/UL (ref 150–450)
PMV BLD AUTO: 10.4 FL (ref 9.2–12.9)
POTASSIUM SERPL-SCNC: 3.8 MMOL/L (ref 3.5–5.1)
PROT SERPL-MCNC: 6 G/DL (ref 6–8.4)
PROTHROMBIN TIME: 24.2 SEC (ref 11.4–13.7)
RBC # BLD AUTO: 4.2 M/UL (ref 4.6–6.2)
SODIUM SERPL-SCNC: 140 MMOL/L (ref 136–145)
WBC # BLD AUTO: 4.11 K/UL (ref 3.9–12.7)

## 2022-09-23 PROCEDURE — G0378 HOSPITAL OBSERVATION PER HR: HCPCS

## 2022-09-23 PROCEDURE — 25000003 PHARM REV CODE 250: Performed by: HOSPITALIST

## 2022-09-23 PROCEDURE — 36415 COLL VENOUS BLD VENIPUNCTURE: CPT | Performed by: HOSPITALIST

## 2022-09-23 PROCEDURE — 85610 PROTHROMBIN TIME: CPT | Performed by: HOSPITALIST

## 2022-09-23 PROCEDURE — 93306 TTE W/DOPPLER COMPLETE: CPT | Mod: 26,,, | Performed by: INTERNAL MEDICINE

## 2022-09-23 PROCEDURE — 83735 ASSAY OF MAGNESIUM: CPT | Performed by: HOSPITALIST

## 2022-09-23 PROCEDURE — 84100 ASSAY OF PHOSPHORUS: CPT | Performed by: HOSPITALIST

## 2022-09-23 PROCEDURE — 80053 COMPREHEN METABOLIC PANEL: CPT | Performed by: HOSPITALIST

## 2022-09-23 PROCEDURE — 83605 ASSAY OF LACTIC ACID: CPT | Performed by: HOSPITALIST

## 2022-09-23 PROCEDURE — 93306 TTE W/DOPPLER COMPLETE: CPT

## 2022-09-23 PROCEDURE — 93306 ECHO (CUPID ONLY): ICD-10-PCS | Mod: 26,,, | Performed by: INTERNAL MEDICINE

## 2022-09-23 PROCEDURE — 85025 COMPLETE CBC W/AUTO DIFF WBC: CPT | Performed by: HOSPITALIST

## 2022-09-23 RX ORDER — LOSARTAN POTASSIUM 50 MG/1
50 TABLET ORAL DAILY
Qty: 30 TABLET | Status: ON HOLD
Start: 2022-09-23 | End: 2023-03-21

## 2022-09-23 RX ORDER — METOPROLOL SUCCINATE 50 MG/1
50 TABLET, EXTENDED RELEASE ORAL DAILY
Qty: 30 TABLET | Refills: 0
Start: 2022-09-23 | End: 2023-03-20 | Stop reason: DRUGHIGH

## 2022-09-23 RX ORDER — SODIUM,POTASSIUM PHOSPHATES 280-250MG
2 POWDER IN PACKET (EA) ORAL ONCE
Status: COMPLETED | OUTPATIENT
Start: 2022-09-23 | End: 2022-09-23

## 2022-09-23 RX ADMIN — ASPIRIN 81 MG: 81 TABLET, COATED ORAL at 09:09

## 2022-09-23 RX ADMIN — METOPROLOL TARTRATE 25 MG: 25 TABLET, FILM COATED ORAL at 08:09

## 2022-09-23 RX ADMIN — ATORVASTATIN CALCIUM 40 MG: 40 TABLET, FILM COATED ORAL at 08:09

## 2022-09-23 RX ADMIN — METOPROLOL TARTRATE 25 MG: 25 TABLET, FILM COATED ORAL at 09:09

## 2022-09-23 RX ADMIN — CITALOPRAM HYDROBROMIDE 10 MG: 10 TABLET ORAL at 09:09

## 2022-09-23 RX ADMIN — POTASSIUM, SODIUM PHOSPHATES 280 MG-160 MG-250 MG ORAL POWDER PACKET 2 PACKET: POWDER IN PACKET at 09:09

## 2022-09-23 RX ADMIN — DONEPEZIL HYDROCHLORIDE 5 MG: 5 TABLET, FILM COATED ORAL at 09:09

## 2022-09-23 RX ADMIN — FINASTERIDE 5 MG: 5 TABLET, FILM COATED ORAL at 09:09

## 2022-09-23 RX ADMIN — GABAPENTIN 100 MG: 100 CAPSULE ORAL at 08:09

## 2022-09-23 RX ADMIN — LOSARTAN POTASSIUM 50 MG: 50 TABLET, FILM COATED ORAL at 09:09

## 2022-09-23 NOTE — PROGRESS NOTES
[facility]  Progress Note    Patient name: Bright Spain  MRN: [id number]  Admit Date: 9/22/2022   LOS: 0 days     SUBJECTIVE:     Principal problem: Atrial fibrillation with RVR    Interval History:  Patient was seen and examined bedside, he is sitting in the bed breathing comfortably on room air, in sinus rhythm, having conversation with multiple family members.  No acute events overnight as per nursing staff, telemetry showed sinus rhythm throughout the night.  Hypotension resolved actually he is little hypertensive today however due for his morning meds    Scheduled Meds:   aspirin  81 mg Oral Daily    atorvastatin  40 mg Oral QHS    citalopram  10 mg Oral Daily    donepeziL  5 mg Oral Daily    finasteride  5 mg Oral Daily    gabapentin  100 mg Oral TID    losartan  50 mg Oral Daily    metoprolol tartrate  25 mg Oral BID    potassium, sodium phosphates  2 packet Oral Once    warfarin  8 mg Oral Daily     Continuous Infusions:  PRN Meds:acetaminophen, glucagon (human recombinant), glucose, glucose, hydrOXYzine pamoate, naloxone, simethicone, sodium chloride 0.9%    Review of patient's allergies indicates:  No Known Allergies    Review of Systems: As per interval history    OBJECTIVE:     Vital Signs (Most Recent)  Temp: 98.6 °F (37 °C) (09/23/22 0438)  Pulse: 65 (09/23/22 0747)  Resp: 20 (09/23/22 0747)  BP: (!) 192/98 (09/23/22 0747)  SpO2: 100 % (09/23/22 0747)    Vital Signs Range (Last 24H):  Temp:  [97.6 °F (36.4 °C)-98.9 °F (37.2 °C)]   Pulse:  []   Resp:  [17-21]   BP: (103-192)/()   SpO2:  [97 %-100 %]     I & O (Last 24H):  Intake/Output Summary (Last 24 hours) at 9/23/2022 0840  Last data filed at 9/23/2022 0500  Gross per 24 hour   Intake 1778 ml   Output 1250 ml   Net 528 ml       Physical Exam:  General: Patient resting comfortably in no acute distress. Appears as stated age. Calm  Eyes: No conjunctival injection. No scleral icterus.  ENT: Hearing grossly  intact. No discharge from ears. No nasal discharge.   Neck: Supple, trachea midline. No JVD  CVS: RRR. No LE edema BL  Lungs:  No tachypnea or accessory muscle use.  Clear to auscultation bilaterally  Abdomen:  Soft, nontender and nondistended.  No organomegaly  Neuro: AOx3. Moves all extremities. Follows commands. Responds appropriately   Skin:  No rash or erythema noted    Laboratory:  I have reviewed all pertinent lab results within the past 24 hours.    Diagnostic Results:  Reviewed all imaging    ASSESSMENT/PLAN:      82-year-old gentleman with prior history of CAD, AFib on Coumadin, ischemic cardiomyopathy, hypertension sent from [facility] due to hypotension, hypoxia found to have AFib RVR, mild lactic acidosis    Active Hospital Problems    Diagnosis  POA    *Atrial fibrillation with RVR [I48.91]  Yes    Acute hypoxemic respiratory failure [J96.01]  Yes     Priority: 2     Lactic acidosis [E87.2]  Yes     Priority: 3     Hypotension [I95.9]  Yes     Priority: 4     Pulmonary HTN [I27.20]  Yes    Ischemic cardiomyopathy [I25.5]  Yes    Mitral regurgitation [I34.0]  Yes    SVT (supraventricular tachycardia) [I47.1]  Yes    Cardiomyopathy [I42.9]  Yes     EF 20-25%      With severe MR (new)-not seen 2/2016 and 3/2016      Essential hypertension [I10]  Yes    Long-term (current) use of anticoagulants [Z79.01]  Not Applicable     Dx updated per 2019 IMO Load        Resolved Hospital Problems   No resolved problems to display.         Plan:   --Overall presentation consistent with prolonged AFib RVR episode at psych facility resulting into hypotension and hypoxia.  I do not suspect any infection/sepsis.  Lactic acidosis likely due to hypoxia however now resolved  --Patient was given IV hydration and his blood pressure improved remarkably, during my interview he converted into sinus rhythm  --Continue essential home medications including Coumadin  --Daily labs including INR  --1:1 sitter  --Continue  psych hold.  CEC was issued on 09/21/2022 at 8:10 p.m.  --Added low-dose metoprolol, remained in sinus rhythm throughout the night    Patient is medically clear for discharge back to psych facility.  Consulted       VTE Risk Mitigation (From admission, onward)           Ordered     warfarin tablet 8 mg  Daily         09/22/22 1643     IP VTE HIGH RISK PATIENT  Once         09/22/22 1327     Place sequential compression device  Until discontinued         09/22/22 1327     Reason for No Pharmacological VTE Prophylaxis  Once        Question:  Reasons:  Answer:  Already adequately anticoagulated on oral Anticoagulants    09/22/22 1327                        Department Hospital Medicine  [facility]  Dion Obregon MD  Date of service: 09/23/2022

## 2022-09-23 NOTE — PLAN OF CARE
Patient cleared for discharge from case management standpoint.    JARRETT faxed discharge orders to Gilda at [facility] intake. Report called per Nurse Hernandez to [facility] charge nurse. [facility] transportation arranged with [facility] 2 pm. CM contacted granddaughter Mrs Sun to notify of discharge.    Chart and discharge orders reviewed.  Patient discharged to [facility] inpatient psyche with no further case management needs.    1526- Updated ETA from [facility] is 6pm per Perry in [facility] patient facilitated transport dept.       09/23/22 1212   Final Note   Assessment Type Final Discharge Note   Anticipated Discharge Disposition Psych [facility]   Hospital Resources/Appts/Education Provided Provided patient/caregiver with written discharge plan information   Post-Acute Status   Discharge Delays (!) Ambulance Transport/Facility Transport

## 2022-09-23 NOTE — PLAN OF CARE
[facility]  Initial Discharge Assessment       Primary Care Provider: Olivia Mckeon MD    Admission Diagnosis: Atrial fibrillation with RVR [I48.91]    Admission Date: 9/22/2022  Expected Discharge Date: 9/23/2022    Discharge Barriers Identified: None    Assessment completed at bedside.  Patient has not addressed advanced directives at this time.  Patient intends to discharge home with assistance of his family.  No needs identified.    Payor: [employer] MANAGED MEDICARE / Plan: [employer] MEDICARE HMO / Product Type: Capitation /     Extended Emergency Contact Information  Primary Emergency Contact: Taniya Spain  Address: [address] [place] [address] [facility]  Home Phone: [phone]  Relation: Spouse  Secondary Emergency Contact: Humble Sun   [place]  Mobile Phone: [phone]  Relation: Grandchild    Discharge Plan A: Home  Discharge Plan B: Home with family      [facility] - [place], [place] - [address]  [address]  [address]  [place] [address]  Phone: [phone] Fax: [phone]    [facility], [place] - [address]  [address] [facility] [address]  Phone: [phone] Fax: [phone]      Initial Assessment (most recent)       Adult Discharge Assessment - 09/23/22 1145          Discharge Assessment    Assessment Type Discharge Planning Assessment     Confirmed/corrected address, phone number and insurance Yes     Confirmed Demographics Correct on Facesheet     Source of Information patient     When was your last doctors appointment? 09/21/22     Reason For Admission afib     Lives With spouse     Facility Arrived From: [facility]     Do you expect to return to your current living situation? Yes     Do you have help at home or someone to help you manage your care at home? Yes     Who are your caregiver(s) and their phone number(s)? Taniya Spain [phone]     Prior to hospitilization cognitive status:  Alert/Oriented     Current cognitive status: Alert/Oriented     Walking or Climbing Stairs Difficulty none     Dressing/Bathing Difficulty none     Equipment Currently Used at Home none     Readmission within 30 days? No     Patient currently being followed by outpatient case management? No     Do you currently have service(s) that help you manage your care at home? No     Do you take prescription medications? Yes     Do you have prescription coverage? Yes     Coverage [employer]     Do you have any problems affording any of your prescribed medications? No     Is the patient taking medications as prescribed? yes     Who is going to help you get home at discharge? family     How do you get to doctors appointments? car, drives self     Are you on dialysis? No     Do you take coumadin? Yes     Who monitors your labs? [facility]     Discharge Plan A Home     Discharge Plan B Home with family     DME Needed Upon Discharge  none     Discharge Plan discussed with: Patient     Discharge Barriers Identified None        Physical Activity    On average, how many days per week do you engage in moderate to strenuous exercise (like a brisk walk)? Patient refused     On average, how many minutes do you engage in exercise at this level? Patient refused        Financial Resource Strain    How hard is it for you to pay for the very basics like food, housing, medical care, and heating? Very hard        Housing Stability    In the last 12 months, was there a time when you were not able to pay the mortgage or rent on time? No     In the last 12 months, how many places have you lived? 1     In the last 12 months, was there a time when you did not have a steady place to sleep or slept in a shelter (including now)? No        Transportation Needs    In the past 12 months, has lack of transportation kept you from medical appointments or from getting medications? No     In the past 12 months, has lack of transportation kept you from  meetings, work, or from getting things needed for daily living? No        Food Insecurity    Within the past 12 months, you worried that your food would run out before you got the money to buy more. Never true     Within the past 12 months, the food you bought just didn't last and you didn't have money to get more. Never true        Stress    Do you feel stress - tense, restless, nervous, or anxious, or unable to sleep at night because your mind is troubled all the time - these days? Not at all        Social Connections    In a typical week, how many times do you talk on the phone with family, friends, or neighbors? More than three times a week     How often do you get together with friends or relatives? More than three times a week     How often do you attend Sabianism or Yazidism services? More than 4 times per year     Do you belong to any clubs or organizations such as Sabianism groups, unions, fraternal or athletic groups, or school groups? Yes     How often do you attend meetings of the clubs or organizations you belong to? More than 4 times per year     Are you , , , , never , or living with a partner?         Alcohol Use    Q1: How often do you have a drink containing alcohol? 4 or more times a week     Q2: How many drinks containing alcohol do you have on a typical day when you are drinking? 1 or 2     Q3: How often do you have six or more drinks on one occasion? Never        Relationship/Environment    Name(s) of Who Lives With Patient Taniya Spain [phone]

## 2022-09-23 NOTE — CONSULTS
I visited patient who requested a  and a rosary.  I provided a rosary and the  came and visited and provided last rites as patient requested.

## 2022-09-24 LAB
AORTIC ROOT ANNULUS: 3.05 CM
AV INDEX (PROSTH): 0.96
AV MEAN GRADIENT: 3 MMHG
AV VALVE AREA: 2.61 CM2
BSA FOR ECHO PROCEDURE: 1.75 M2
CV ECHO LV RWT: 0.57 CM
DOP CALC AO VTI: 25.48 CM
DOP CALC LVOT AREA: 2.7 CM2
DOP CALC LVOT DIAMETER: 1.86 CM
DOP CALC LVOT PEAK VEL: 106.48 M/S
DOP CALC LVOT STROKE VOLUME: 66.46 CM3
DOP CALCLVOT PEAK VEL VTI: 24.47 CM
E WAVE DECELERATION TIME: 223.77 MSEC
E/A RATIO: 1
E/E' RATIO: 10.38 M/S
ECHO LV POSTERIOR WALL: 1.15 CM (ref 0.6–1.1)
EJECTION FRACTION: 50 %
FRACTIONAL SHORTENING: 17 % (ref 28–44)
INTERVENTRICULAR SEPTUM: 1.11 CM (ref 0.6–1.1)
LEFT ATRIUM VOLUME INDEX MOD: 29.8 ML/M2
LEFT ATRIUM VOLUME MOD: 50.67 CM3
LEFT INTERNAL DIMENSION IN SYSTOLE: 3.35 CM (ref 2.1–4)
LEFT VENTRICLE DIASTOLIC VOLUME INDEX: 39.22 ML/M2
LEFT VENTRICLE DIASTOLIC VOLUME: 66.68 ML
LEFT VENTRICLE MASS INDEX: 91 G/M2
LEFT VENTRICLE SYSTOLIC VOLUME INDEX: 22.1 ML/M2
LEFT VENTRICLE SYSTOLIC VOLUME: 37.56 ML
LEFT VENTRICULAR INTERNAL DIMENSION IN DIASTOLE: 4.06 CM (ref 3.5–6)
LEFT VENTRICULAR MASS: 154.95 G
LV LATERAL E/E' RATIO: 6.92 M/S
LV SEPTAL E/E' RATIO: 20.75 M/S
MV PEAK A VEL: 0.83 M/S
MV PEAK E VEL: 0.83 M/S
PISA MRMAX VEL: 495.41 M/S
PISA TR MAX VEL: 2.54 M/S
RIGHT VENTRICULAR END-DIASTOLIC DIMENSION: 3.16 CM
TDI LATERAL: 0.12 M/S
TDI SEPTAL: 0.04 M/S
TDI: 0.08 M/S
TR MAX PG: 26 MMHG
TRICUSPID ANNULAR PLANE SYSTOLIC EXCURSION: 1.21 CM

## 2022-09-24 NOTE — HOSPITAL COURSE
82-year-old gentleman with prior history of CAD, AFib on Coumadin, ischemic cardiomyopathy, hypertension sent from [facility] due to hypotension, hypoxia found to have AFib RVR, mild lactic acidosis.  Overall it appears the patient had prolonged episode of AFib RVR at psych facility resulting into hypotension, mild hypoxia and lactic acidosis.  Since admitted patient converted into sinus rhythm, blood pressure is much better requiring antihypertensives, breathing comfortably on room air.  Today upon my entering the room multiple family members present and patient was having a pleasant conversation, denies any new symptoms, heart rate ranges from 60s to 80s in sinus rhythm.  Already anticoagulated by Coumadin.  He was discharged back to [facility] facility where he was already under psych hold.  Updated multiple family members at bedside.     I have seen the patient on the day of discharge and reviewed the discharge instructions as outlined below. Patient verbalized understanding and is aware to contact primary care physician or return to ED if new or worsening symptoms.

## 2022-09-24 NOTE — DISCHARGE SUMMARY
[facility] Medicine  Discharge Summary      Patient Name: Bright Spain  MRN: [id number]  Patient Class: OP- Observation  Admission Date: 9/22/2022  Hospital Length of Stay: 0 days  Discharge Date and Time: 9/23/2022 10:25 PM  Attending Physician: Myra att. providers found   Discharging Provider: Dion Obregon MD  Primary Care Provider: Olivia Mckeon MD      HPI:   82-year-old gentleman with prior history of CAD, AFib on Coumadin, ischemic cardiomyopathy with EF of 25% in the past, hypertension, SVT sent from [facility] due to hypotension and hypoxia.  As per my report from ER provider [facility] and since morning he had multiple readings of low blood pressure, hypoxia and was found to have AFib RVR and was subsequently sent to emergency room.  Upon my interview patient is eating his lunch, denies any fever, chills, headache, dizziness, chest pain palpitations, nausea, vomiting, bladder or bowel symptoms.  Upon arrival to ED he was borderline hypoxic, hypotensive however responded well with IV fluids.  Found to have mild lactic acidosis likely due to hypoxia.  Hospital medicine was consulted for admission.       * No surgery found *      Hospital Course:   82-year-old gentleman with prior history of CAD, AFib on Coumadin, ischemic cardiomyopathy, hypertension sent from [facility] due to hypotension, hypoxia found to have AFib RVR, mild lactic acidosis.  Overall it appears the patient had prolonged episode of AFib RVR at psych facility resulting into hypotension, mild hypoxia and lactic acidosis.  Since admitted patient converted into sinus rhythm, blood pressure is much better requiring antihypertensives, breathing comfortably on room air.  Today upon my entering the room multiple family members present and patient was having a pleasant conversation, denies any new symptoms, heart rate ranges from 60s to 80s in sinus rhythm.  Already  anticoagulated by Coumadin.  He was discharged back to psych facility where he was already under psych hold.  Updated multiple family members at bedside.     I have seen the patient on the day of discharge and reviewed the discharge instructions as outlined below. Patient verbalized understanding and is aware to contact primary care physician or return to ED if new or worsening symptoms.        Goals of Care Treatment Preferences:  Code Status: Full Code      Consults:   Consults (From admission, onward)        Status Ordering Provider     Inpatient consult to [facility]  Once        Provider:  (Not yet assigned)    MANSOOR Mccann          No new Assessment & Plan notes have been filed under this hospital service since the last note was generated.  Service: Hospital Medicine    Final Active Diagnoses:    Diagnosis Date Noted POA    PRINCIPAL PROBLEM:  Atrial fibrillation with RVR [I48.91] 09/22/2022 Yes    Pulmonary HTN [I27.20] 11/03/2017 Yes    Ischemic cardiomyopathy [I25.5] 03/31/2017 Yes    Mitral regurgitation [I34.0] 04/21/2016 Yes    SVT (supraventricular tachycardia) [I47.1] 04/16/2016 Yes    Cardiomyopathy [I42.9] 04/04/2016 Yes    Essential hypertension [I10] 02/18/2016 Yes    Long-term (current) use of anticoagulants [Z79.01] 02/18/2016 Not Applicable      Problems Resolved During this Admission:    Diagnosis Date Noted Date Resolved POA    Acute hypoxemic respiratory failure [J96.01] 09/22/2022 09/23/2022 Yes    Lactic acidosis [E87.2] 09/22/2022 09/23/2022 Yes    Hypotension [I95.9] 09/22/2022 09/23/2022 Yes       Discharged Condition: good    Disposition: Psych[facility] Hospital    Follow Up:   Follow-up Information     Olivia Mckeon MD Follow up in 2 week(s).    Specialty: Internal Medicine  Contact information:  [address] [facility]  [address]  [facility] [address] [phone]                       Patient Instructions:      Diet Cardiac     Notify your  health care provider if you experience any of the following:  severe uncontrolled pain     Notify your health care provider if you experience any of the following:  persistent dizziness, light-headedness, or visual disturbances     Activity as tolerated       Significant Diagnostic Studies: Labs: All labs within the past 24 hours have been reviewed    Pending Diagnostic Studies:     None         Medications:  Reconciled Home Medications:      Medication List      START taking these medications    metoprolol succinate 50 MG 24 hr tablet  Commonly known as: TOPROL-XL  Take 1 tablet (50 mg total) by mouth once daily.        CHANGE how you take these medications    warfarin 4 MG tablet  Commonly known as: COUMADIN  Take 2 tablets (8mg) PO Sunday, Tuesday, Thursday; then take 1.5 tablets (6mg) PO all other days as or as directed by [facility].  What changed:   · how much to take  · how to take this  · when to take this        CONTINUE taking these medications    acetaminophen 325 MG tablet  Commonly known as: TYLENOL  Take 650 mg by mouth every 6 (six) hours as needed for Pain. Added by MAR (Medication Administration Report)     allopurinoL 300 MG tablet  Commonly known as: ZYLOPRIM  Take 300 mg by mouth once daily.     aluminum-magnesium hydroxide-simethicone 200-200-20 mg/5 mL Susp  Commonly known as: MAALOX  Take 30 mLs by mouth 4 (four) times daily before meals and nightly. Added by MAR (Medication Administration Report)     aspirin 81 MG EC tablet  Commonly known as: ECOTRIN  Take 81 mg by mouth once daily.     atorvastatin 40 MG tablet  Commonly known as: LIPITOR  Take 40 mg by mouth once daily.     citalopram 10 MG tablet  Commonly known as: CeleXA  Take 10 mg by mouth once daily.     cloNIDine 0.1 MG tablet  Commonly known as: CATAPRES  Take 0.1 mg by mouth every 6 (six) hours as needed. Added by MAR (Medication Administration Report)     donepeziL 5 MG tablet  Commonly known as: ARICEPT  Take 5 mg by mouth  once daily.     finasteride 5 mg tablet  Commonly known as: PROSCAR  Take 5 mg by mouth once daily.     gabapentin 100 MG capsule  Commonly known as: NEURONTIN  Take 100 mg by mouth 3 (three) times daily.     hydrOXYzine pamoate 50 MG Cap  Commonly known as: VISTARIL  Take 25 mg by mouth every 8 (eight) hours as needed. Added  by patient's MAR (Medication Administration Report)     ibuprofen 200 MG tablet  Commonly known as: ADVIL,MOTRIN  Take 400 mg by mouth every 6 (six) hours as needed for Pain. Added by MAR (Medication Administration Report)     loperamide 2 mg capsule  Commonly known as: IMODIUM  Take 2 mg by mouth 4 (four) times daily as needed for Diarrhea. Added by MAR (Medication Administration Report)     losartan 50 MG tablet  Commonly known as: COZAAR  Take 1 tablet (50 mg total) by mouth once daily.     MILK OF MAGNESIA 400 mg/5 mL Susp  Generic drug: magnesium hydroxide 400 mg/5 ml  Take 30 mLs by mouth daily as needed. Added by MAR (Medication Administration Report)     nitroGLYCERIN 0.4 MG SL tablet  Commonly known as: NITROSTAT  Place 1 tablet (0.4 mg total) under the tongue every 5 (five) minutes as needed for Chest pain.     ondansetron 4 MG tablet  Commonly known as: ZOFRAN  Take 4 mg by mouth every 6 (six) hours as needed for Nausea. Added by MAR (Medication Administration Report)     tamsulosin 0.4 mg Cap  Commonly known as: FLOMAX  Take 0.4 mg by mouth once daily.        STOP taking these medications    multivitamin with minerals tablet     potassium chloride SA 20 MEQ tablet  Commonly known as: K-DUR,KLOR-CON     sildenafiL 50 MG tablet  Commonly known as: VIAGRA            Indwelling Lines/Drains at time of discharge:   Lines/Drains/Airways     None                 Time spent on the discharge of patient: 32 minutes         Dion Obregon MD  Department of Hospital Medicine  [facility]

## 2022-09-27 LAB
BACTERIA BLD CULT: NORMAL
BACTERIA BLD CULT: NORMAL

## 2022-10-13 ENCOUNTER — ANTI-COAG VISIT (OUTPATIENT)
Dept: CARDIOLOGY | Facility: CLINIC | Age: 82
End: 2022-10-13
Payer: MEDICARE

## 2022-10-13 DIAGNOSIS — Z79.01 LONG TERM CURRENT USE OF ANTICOAGULANT THERAPY: Primary | ICD-10-CM

## 2022-10-13 LAB — INR PPP: 3

## 2022-10-13 PROCEDURE — 93793 ANTICOAG MGMT PT WARFARIN: CPT | Mod: S$GLB,,,

## 2022-10-13 PROCEDURE — 93793 PR ANTICOAGULANT MGMT FOR PT TAKING WARFARIN: ICD-10-PCS | Mod: S$GLB,,,

## 2022-11-11 ENCOUNTER — ANTI-COAG VISIT (OUTPATIENT)
Dept: CARDIOLOGY | Facility: CLINIC | Age: 82
End: 2022-11-11
Payer: MEDICARE

## 2022-11-11 DIAGNOSIS — Z79.01 LONG TERM CURRENT USE OF ANTICOAGULANT THERAPY: Primary | ICD-10-CM

## 2022-11-11 LAB — INR PPP: 4.5

## 2022-11-11 PROCEDURE — 93793 ANTICOAG MGMT PT WARFARIN: CPT | Mod: S$GLB,,,

## 2022-11-11 PROCEDURE — 93793 PR ANTICOAGULANT MGMT FOR PT TAKING WARFARIN: ICD-10-PCS | Mod: S$GLB,,,

## 2022-11-11 NOTE — PROGRESS NOTES
INR not at goal. Medications, chart, and patient findings reviewed. See calendar for adjustments to dose and follow up plan.  As pt denies no changes recommend a larger reduction to put patient back within therapeutic range.  See calendar.  Also, recommend to re-assess within a week per calendar.

## 2022-11-17 ENCOUNTER — ANTI-COAG VISIT (OUTPATIENT)
Dept: CARDIOLOGY | Facility: CLINIC | Age: 82
End: 2022-11-17
Payer: MEDICARE

## 2022-11-17 DIAGNOSIS — Z79.01 LONG TERM CURRENT USE OF ANTICOAGULANT THERAPY: Primary | ICD-10-CM

## 2022-11-17 LAB — INR PPP: 2.9

## 2022-11-17 PROCEDURE — 93793 PR ANTICOAGULANT MGMT FOR PT TAKING WARFARIN: ICD-10-PCS | Mod: ,,,

## 2022-11-17 PROCEDURE — 93793 ANTICOAG MGMT PT WARFARIN: CPT | Mod: ,,,

## 2022-11-17 NOTE — PROGRESS NOTES
.INR at goal. Medications and chart reviewed. No changes noted to necessitate adjustment of warfarin or follow-up plan. See calendar.  INR from 11/15. See findings.  Pt took a lower dose than recommended.  As pt was previously supratherapeutic w/o cause will not adjust dosing to compensate for this.  Plan to f/u next week.

## 2022-11-22 ENCOUNTER — ANTI-COAG VISIT (OUTPATIENT)
Dept: CARDIOLOGY | Facility: CLINIC | Age: 82
End: 2022-11-22
Payer: MEDICARE

## 2022-11-22 DIAGNOSIS — Z79.01 LONG TERM CURRENT USE OF ANTICOAGULANT THERAPY: Primary | ICD-10-CM

## 2022-11-22 LAB — INR PPP: 2.58

## 2022-11-22 PROCEDURE — 93793 ANTICOAG MGMT PT WARFARIN: CPT | Mod: S$GLB,,,

## 2022-11-22 PROCEDURE — 93793 PR ANTICOAGULANT MGMT FOR PT TAKING WARFARIN: ICD-10-PCS | Mod: S$GLB,,,

## 2022-11-28 ENCOUNTER — LAB VISIT (OUTPATIENT)
Dept: LAB | Facility: HOSPITAL | Age: 82
End: 2022-11-28
Payer: MEDICARE

## 2022-11-28 ENCOUNTER — OFFICE VISIT (OUTPATIENT)
Dept: PSYCHIATRY | Facility: CLINIC | Age: 82
End: 2022-11-28
Payer: MEDICARE

## 2022-11-28 VITALS
SYSTOLIC BLOOD PRESSURE: 152 MMHG | BODY MASS INDEX: 33.07 KG/M2 | DIASTOLIC BLOOD PRESSURE: 70 MMHG | HEART RATE: 56 BPM | WEIGHT: 169.31 LBS

## 2022-11-28 DIAGNOSIS — F03.90 MAJOR NEUROCOGNITIVE DISORDER: ICD-10-CM

## 2022-11-28 DIAGNOSIS — F03.90 MAJOR NEUROCOGNITIVE DISORDER: Primary | ICD-10-CM

## 2022-11-28 LAB
FOLATE SERPL-MCNC: 6.7 NG/ML (ref 4–24)
T3 SERPL-MCNC: 72 NG/DL (ref 60–180)
T4 FREE SERPL-MCNC: 0.82 NG/DL (ref 0.71–1.51)
T4 SERPL-MCNC: 5.1 UG/DL (ref 4.5–11.5)
THYROPEROXIDASE IGG SERPL-ACNC: <6 IU/ML
TSH SERPL DL<=0.005 MIU/L-ACNC: 2.62 UIU/ML (ref 0.4–4)
VIT B12 SERPL-MCNC: 231 PG/ML (ref 210–950)

## 2022-11-28 PROCEDURE — 86376 MICROSOMAL ANTIBODY EACH: CPT | Performed by: STUDENT IN AN ORGANIZED HEALTH CARE EDUCATION/TRAINING PROGRAM

## 2022-11-28 PROCEDURE — 84443 ASSAY THYROID STIM HORMONE: CPT | Performed by: STUDENT IN AN ORGANIZED HEALTH CARE EDUCATION/TRAINING PROGRAM

## 2022-11-28 PROCEDURE — 3077F PR MOST RECENT SYSTOLIC BLOOD PRESSURE >= 140 MM HG: ICD-10-PCS | Mod: CPTII,S$GLB,, | Performed by: PSYCHIATRY & NEUROLOGY

## 2022-11-28 PROCEDURE — 82746 ASSAY OF FOLIC ACID SERUM: CPT | Performed by: STUDENT IN AN ORGANIZED HEALTH CARE EDUCATION/TRAINING PROGRAM

## 2022-11-28 PROCEDURE — 99205 OFFICE O/P NEW HI 60 MIN: CPT | Mod: S$GLB,,, | Performed by: PSYCHIATRY & NEUROLOGY

## 2022-11-28 PROCEDURE — 99205 PR OFFICE/OUTPT VISIT, NEW, LEVL V, 60-74 MIN: ICD-10-PCS | Mod: S$GLB,,, | Performed by: PSYCHIATRY & NEUROLOGY

## 2022-11-28 PROCEDURE — 82607 VITAMIN B-12: CPT | Performed by: STUDENT IN AN ORGANIZED HEALTH CARE EDUCATION/TRAINING PROGRAM

## 2022-11-28 PROCEDURE — 84436 ASSAY OF TOTAL THYROXINE: CPT | Performed by: STUDENT IN AN ORGANIZED HEALTH CARE EDUCATION/TRAINING PROGRAM

## 2022-11-28 PROCEDURE — 3077F SYST BP >= 140 MM HG: CPT | Mod: CPTII,S$GLB,, | Performed by: PSYCHIATRY & NEUROLOGY

## 2022-11-28 PROCEDURE — 36415 COLL VENOUS BLD VENIPUNCTURE: CPT | Performed by: STUDENT IN AN ORGANIZED HEALTH CARE EDUCATION/TRAINING PROGRAM

## 2022-11-28 PROCEDURE — 99999 PR PBB SHADOW E&M-EST. PATIENT-LVL II: CPT | Mod: PBBFAC,,, | Performed by: PSYCHIATRY & NEUROLOGY

## 2022-11-28 PROCEDURE — 3078F PR MOST RECENT DIASTOLIC BLOOD PRESSURE < 80 MM HG: ICD-10-PCS | Mod: CPTII,S$GLB,, | Performed by: PSYCHIATRY & NEUROLOGY

## 2022-11-28 PROCEDURE — 84439 ASSAY OF FREE THYROXINE: CPT | Performed by: STUDENT IN AN ORGANIZED HEALTH CARE EDUCATION/TRAINING PROGRAM

## 2022-11-28 PROCEDURE — 3078F DIAST BP <80 MM HG: CPT | Mod: CPTII,S$GLB,, | Performed by: PSYCHIATRY & NEUROLOGY

## 2022-11-28 PROCEDURE — 99999 PR PBB SHADOW E&M-EST. PATIENT-LVL II: ICD-10-PCS | Mod: PBBFAC,,, | Performed by: PSYCHIATRY & NEUROLOGY

## 2022-11-28 PROCEDURE — 84480 ASSAY TRIIODOTHYRONINE (T3): CPT | Performed by: STUDENT IN AN ORGANIZED HEALTH CARE EDUCATION/TRAINING PROGRAM

## 2022-11-28 RX ORDER — RISPERIDONE 0.25 MG/1
0.25 TABLET ORAL 2 TIMES DAILY
Qty: 120 TABLET | Refills: 0 | Status: ON HOLD | OUTPATIENT
Start: 2022-11-28 | End: 2023-03-21 | Stop reason: HOSPADM

## 2022-11-28 RX ORDER — MEMANTINE HYDROCHLORIDE 5 MG/1
5 TABLET ORAL 2 TIMES DAILY
Qty: 120 TABLET | Refills: 0 | Status: SHIPPED | OUTPATIENT
Start: 2022-11-28 | End: 2023-02-07 | Stop reason: SDUPTHER

## 2022-11-28 RX ORDER — CITALOPRAM 10 MG/1
10 TABLET ORAL DAILY
Qty: 60 TABLET | Refills: 0 | Status: SHIPPED | OUTPATIENT
Start: 2022-11-28 | End: 2023-01-27

## 2022-11-28 RX ORDER — CITALOPRAM 20 MG/1
20 TABLET, FILM COATED ORAL DAILY
Qty: 60 TABLET | Refills: 0 | Status: SHIPPED | OUTPATIENT
Start: 2022-11-28 | End: 2023-01-27
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
## 2022-11-29 RX ORDER — ACETAMINOPHEN AND PHENYLEPHRINE HCL 325; 5 MG/1; MG/1
1000 TABLET ORAL DAILY
Qty: 60 TABLET | Refills: 0 | Status: SHIPPED | OUTPATIENT
Start: 2022-11-29 | End: 2022-12-06 | Stop reason: SDUPTHER

## 2022-12-06 ENCOUNTER — TELEPHONE (OUTPATIENT)
Dept: PSYCHIATRY | Facility: CLINIC | Age: 82
End: 2022-12-06
Payer: MEDICARE

## 2022-12-06 RX ORDER — ACETAMINOPHEN AND PHENYLEPHRINE HCL 325; 5 MG/1; MG/1
1000 TABLET ORAL DAILY
Qty: 60 TABLET | Refills: 0 | Status: SHIPPED | OUTPATIENT
Start: 2022-12-06 | End: 2023-04-26 | Stop reason: SDUPTHER

## 2022-12-06 NOTE — TELEPHONE ENCOUNTER
----- Message from Meghan Roberto MA sent at 12/1/2022 10:45 AM CST -----  Good morning,    The patient's granddaughter Humble state per the patient's pharmacy they do no carry the medication cyanocobalamin-cobamamide (B-12 PLUS) 5,000-100 mcg the pharmacy would like to know if you would like to change the medication to the alternative medication that's in stock. Per Humble (granddaughter) if you would prefer the patient take the cyanocobalamin-cobamamide (B-12 PLUS) 5,000-100 mcg can send a script to the pharmacy below    [place] [address] [facility]   Phone:  [phone]  Fax:  [phone]

## 2022-12-06 NOTE — TELEPHONE ENCOUNTER
----- Message from Meghan Roberto MA sent at 12/1/2022 12:59 PM CST -----  I spoke with the pharmacy and was informed there's no alternative for the medication and the only B12 they  would have would be over the counter.   ----- Message -----  From: Wolfgang Peterson Jr., MD  Sent: 12/1/2022  12:48 PM CST  To: Meghan Roberto MA    I don't see a difference.    ----- Message -----  From: Meghan Roberto MA  Sent: 12/1/2022  10:52 AM CST  To: Wolfgang Peterson Jr., MD, Sharif Bradford MD    Good morning,    The patient's granddaughter Humble state per the patient's pharmacy they do no carry the medication cyanocobalamin-cobamamide (B-12 PLUS) 5,000-100 mcg the pharmacy would like to know if you would like to change the medication to the alternative medication that's in stock. Per Jose Mariaclarissa (granddaughter) if you would prefer the patient take the cyanocobalamin-cobamamide (B-12 PLUS) 5,000-100 mcg can send a script to the pharmacy below    [place] [address] [facility]   Phone:  [phone]  Fax:  [phone]

## 2022-12-09 ENCOUNTER — ANTI-COAG VISIT (OUTPATIENT)
Dept: CARDIOLOGY | Facility: CLINIC | Age: 82
End: 2022-12-09
Payer: MEDICARE

## 2022-12-09 DIAGNOSIS — Z79.01 LONG TERM CURRENT USE OF ANTICOAGULANT THERAPY: Primary | ICD-10-CM

## 2022-12-09 LAB — INR PPP: 2.5

## 2022-12-09 PROCEDURE — 93793 PR ANTICOAGULANT MGMT FOR PT TAKING WARFARIN: ICD-10-PCS | Mod: S$GLB,,,

## 2022-12-09 PROCEDURE — 93793 ANTICOAG MGMT PT WARFARIN: CPT | Mod: S$GLB,,,

## 2022-12-09 NOTE — PROGRESS NOTES
INR at goal. Medications and chart reviewed. No changes noted to necessitate adjustment of warfarin or follow-up plan. See calendar.    Spoke to pt's granddaughter.  She was not able to say if pt is experiencing any symptoms (I.e. pain, frequency, urgency, tenderness, etc.).  Advised her to contact [facility] or PCP if pt is experiencing any symptoms or bleeding returns.  She indicated understanding.  She also states that pt's bleeding resolved Wednesday.  Reviewed w/ pt risks of taking blood thinner/warfarin.

## 2022-12-27 DIAGNOSIS — S30.1XXA CONTUSION OF ABDOMINAL WALL: Primary | ICD-10-CM

## 2022-12-28 LAB — INR PPP: 4

## 2022-12-29 ENCOUNTER — ANTI-COAG VISIT (OUTPATIENT)
Dept: CARDIOLOGY | Facility: CLINIC | Age: 82
End: 2022-12-29
Payer: MEDICARE

## 2022-12-29 DIAGNOSIS — Z79.01 LONG TERM CURRENT USE OF ANTICOAGULANT THERAPY: Primary | ICD-10-CM

## 2022-12-29 PROCEDURE — 93793 ANTICOAG MGMT PT WARFARIN: CPT | Mod: S$GLB,,,

## 2022-12-29 PROCEDURE — 93793 PR ANTICOAGULANT MGMT FOR PT TAKING WARFARIN: ICD-10-PCS | Mod: S$GLB,,,

## 2022-12-30 ENCOUNTER — HOSPITAL ENCOUNTER (OUTPATIENT)
Dept: RADIOLOGY | Facility: HOSPITAL | Age: 82
Discharge: HOME | End: 2022-12-30
Attending: PHYSICIAN ASSISTANT
Payer: MEDICARE

## 2022-12-30 DIAGNOSIS — S30.1XXA CONTUSION OF ABDOMINAL WALL: ICD-10-CM

## 2022-12-30 PROCEDURE — 76705 ECHO EXAM OF ABDOMEN: CPT | Mod: TC,PO

## 2023-01-19 ENCOUNTER — ANTI-COAG VISIT (OUTPATIENT)
Dept: CARDIOLOGY | Facility: CLINIC | Age: 83
End: 2023-01-19
Payer: MEDICARE

## 2023-01-19 DIAGNOSIS — Z79.01 LONG TERM CURRENT USE OF ANTICOAGULANT THERAPY: Primary | ICD-10-CM

## 2023-01-19 LAB — INR PPP: 3

## 2023-01-19 PROCEDURE — 93793 PR ANTICOAGULANT MGMT FOR PT TAKING WARFARIN: ICD-10-PCS | Mod: S$GLB,,,

## 2023-01-19 PROCEDURE — 93793 ANTICOAG MGMT PT WARFARIN: CPT | Mod: S$GLB,,,

## 2023-01-19 NOTE — PROGRESS NOTES
INR at goal. Medications and chart reviewed. No changes noted to necessitate adjustment of warfarin or follow-up plan. See calendar.  INR from 1/9 almost 10 days ago.  Recommend to re-assess Monday as tomorrow is Friday.

## 2023-01-20 ENCOUNTER — HOSPITAL ENCOUNTER (EMERGENCY)
Facility: HOSPITAL | Age: 83
Discharge: HOME | End: 2023-01-20
Attending: EMERGENCY MEDICINE
Payer: MEDICARE

## 2023-01-20 VITALS
OXYGEN SATURATION: 98 % | DIASTOLIC BLOOD PRESSURE: 94 MMHG | SYSTOLIC BLOOD PRESSURE: 183 MMHG | HEART RATE: 56 BPM | WEIGHT: 180.31 LBS | TEMPERATURE: 98 F | BODY MASS INDEX: 25.24 KG/M2 | HEIGHT: 71 IN | RESPIRATION RATE: 16 BRPM

## 2023-01-20 DIAGNOSIS — S01.81XA CHIN LACERATION, INITIAL ENCOUNTER: Primary | ICD-10-CM
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
## 2023-01-20 DIAGNOSIS — R79.1 ELEVATED INR: ICD-10-CM

## 2023-01-20 LAB
ANION GAP SERPL CALC-SCNC: 10 MMOL/L (ref 8–16)
BASOPHILS # BLD AUTO: 0.04 K/UL (ref 0–0.2)
BASOPHILS NFR BLD: 1 % (ref 0–1.9)
CALCIUM SERPL-MCNC: 8.6 MG/DL (ref 8.7–10.5)
CHLORIDE SERPL-SCNC: 102 MMOL/L (ref 95–110)
CO2 SERPL-SCNC: 24 MMOL/L (ref 23–29)
CREAT SERPL-MCNC: 1.1 MG/DL (ref 0.5–1.4)
DIFFERENTIAL METHOD: ABNORMAL
EOSINOPHIL # BLD AUTO: 0.1 K/UL (ref 0–0.5)
EOSINOPHIL NFR BLD: 3.4 % (ref 0–8)
ERYTHROCYTE [DISTWIDTH] IN BLOOD BY AUTOMATED COUNT: 13.6 % (ref 11.5–14.5)
EST. GFR  (NO RACE VARIABLE): >60 ML/MIN/1.73 M^2
GLUCOSE SERPL-MCNC: 93 MG/DL (ref 70–110)
HCT VFR BLD AUTO: 37.8 % (ref 40–54)
HGB BLD-MCNC: 12 G/DL (ref 14–18)
IMM GRANULOCYTES # BLD AUTO: 0.01 K/UL (ref 0–0.04)
IMM GRANULOCYTES NFR BLD AUTO: 0.2 % (ref 0–0.5)
INR PPP: 4.1 (ref 0.8–1.2)
LYMPHOCYTES # BLD AUTO: 1.5 K/UL (ref 1–4.8)
LYMPHOCYTES NFR BLD: 36.7 % (ref 18–48)
MCH RBC QN AUTO: 30.8 PG (ref 27–31)
MCHC RBC AUTO-ENTMCNC: 31.7 G/DL (ref 32–36)
MCV RBC AUTO: 97 FL (ref 82–98)
MONOCYTES # BLD AUTO: 0.4 K/UL (ref 0.3–1)
MONOCYTES NFR BLD: 9.4 % (ref 4–15)
NEUTROPHILS # BLD AUTO: 2 K/UL (ref 1.8–7.7)
NEUTROPHILS NFR BLD: 49.3 % (ref 38–73)
NRBC BLD-RTO: 0 /100 WBC
PLATELET # BLD AUTO: 142 K/UL (ref 150–450)
PMV BLD AUTO: 11.1 FL (ref 9.2–12.9)
POTASSIUM SERPL-SCNC: 4.1 MMOL/L (ref 3.5–5.1)
PROTHROMBIN TIME: 40.8 SEC (ref 9–12.5)
RBC # BLD AUTO: 3.89 M/UL (ref 4.6–6.2)
SODIUM SERPL-SCNC: 136 MMOL/L (ref 136–145)
UUN UR-MCNC: 19 MG/DL (ref 2–20)
WBC # BLD AUTO: 4.06 K/UL (ref 3.9–12.7)

## 2023-01-20 PROCEDURE — 25000003 PHARM REV CODE 250: Mod: ER | Performed by: EMERGENCY MEDICINE

## 2023-01-20 PROCEDURE — 12011 RPR F/E/E/N/L/M 2.5 CM/<: CPT | Mod: ER

## 2023-01-20 PROCEDURE — 93005 ELECTROCARDIOGRAM TRACING: CPT | Mod: ER

## 2023-01-20 PROCEDURE — 93010 EKG 12-LEAD: ICD-10-PCS | Mod: ,,, | Performed by: INTERNAL MEDICINE

## 2023-01-20 PROCEDURE — 85610 PROTHROMBIN TIME: CPT | Mod: ER | Performed by: EMERGENCY MEDICINE

## 2023-01-20 PROCEDURE — 63600175 PHARM REV CODE 636 W HCPCS: Mod: ER | Performed by: EMERGENCY MEDICINE

## 2023-01-20 PROCEDURE — 80048 BASIC METABOLIC PNL TOTAL CA: CPT | Mod: ER | Performed by: EMERGENCY MEDICINE

## 2023-01-20 PROCEDURE — 96361 HYDRATE IV INFUSION ADD-ON: CPT | Mod: 59,ER

## 2023-01-20 PROCEDURE — 90715 TDAP VACCINE 7 YRS/> IM: CPT | Mod: ER | Performed by: EMERGENCY MEDICINE

## 2023-01-20 PROCEDURE — 96374 THER/PROPH/DIAG INJ IV PUSH: CPT | Mod: 59,ER

## 2023-01-20 PROCEDURE — 93010 ELECTROCARDIOGRAM REPORT: CPT | Mod: ,,, | Performed by: INTERNAL MEDICINE

## 2023-01-20 PROCEDURE — 85025 COMPLETE CBC W/AUTO DIFF WBC: CPT | Mod: ER | Performed by: EMERGENCY MEDICINE

## 2023-01-20 PROCEDURE — 90471 IMMUNIZATION ADMIN: CPT | Mod: ER | Performed by: EMERGENCY MEDICINE

## 2023-01-20 PROCEDURE — 99285 EMERGENCY DEPT VISIT HI MDM: CPT | Mod: 25,ER

## 2023-01-20 PROCEDURE — 94760 N-INVAS EAR/PLS OXIMETRY 1: CPT | Mod: ER

## 2023-01-20 RX ORDER — HYDRALAZINE HYDROCHLORIDE 20 MG/ML
10 INJECTION INTRAMUSCULAR; INTRAVENOUS
Status: COMPLETED | OUTPATIENT
Start: 2023-01-20 | End: 2023-01-20

## 2023-01-20 RX ORDER — CLONIDINE HYDROCHLORIDE 0.1 MG/1
0.1 TABLET ORAL
Status: COMPLETED | OUTPATIENT
Start: 2023-01-20 | End: 2023-01-20

## 2023-01-20 RX ORDER — LIDOCAINE HYDROCHLORIDE 10 MG/ML
10 INJECTION, SOLUTION EPIDURAL; INFILTRATION; INTRACAUDAL; PERINEURAL
Status: COMPLETED | OUTPATIENT
Start: 2023-01-20 | End: 2023-01-20

## 2023-01-20 RX ADMIN — LIDOCAINE HYDROCHLORIDE 100 MG: 10 INJECTION, SOLUTION EPIDURAL; INFILTRATION; INTRACAUDAL; PERINEURAL at 01:01

## 2023-01-20 RX ADMIN — SODIUM CHLORIDE 250 ML: 0.9 INJECTION, SOLUTION INTRAVENOUS at 01:01

## 2023-01-20 RX ADMIN — CLONIDINE HYDROCHLORIDE 0.1 MG: 0.1 TABLET ORAL at 02:01

## 2023-01-20 RX ADMIN — TETANUS TOXOID, REDUCED DIPHTHERIA TOXOID AND ACELLULAR PERTUSSIS VACCINE, ADSORBED 0.5 ML: 5; 2.5; 8; 8; 2.5 SUSPENSION INTRAMUSCULAR at 01:01

## 2023-01-20 RX ADMIN — Medication 1 ML: at 01:01

## 2023-01-20 RX ADMIN — HYDRALAZINE HYDROCHLORIDE 10 MG: 20 INJECTION, SOLUTION INTRAMUSCULAR; INTRAVENOUS at 01:01

## 2023-01-20 NOTE — ED PROVIDER NOTES
Encounter Date: 2023       History   Encounter Date: 2023        History           Chief Complaint   Patient presents with    Fall       Pt arrives to the ED c/o falling at home. Pt go up to use the restroom and lost his balance and fell and hit his chin on the toilet. Denies LOC. Pt takes Coumadin. LAC under chin. Bleeding controlled.       Bright Spain is a 82 y.o. male who  has a past medical history of Atrial fibrillation, Gout, Hyperlipidemia, and Hypertension.     The patient presents to the ED due to a fall.  Patient is here with his wife and granddaughter.  They state that patient got up to go use the restroom when he lost his balance and fell and hit his chin on the toilet.  Patient did not lose consciousness.  He reports no pain at this time.  Patient of note has a history of dementia however he is answering questions appropriately and is able to participate in the history and physical exam.  He does take Coumadin for atrial fibrillation.  He denies any neck or back pain chest pain trouble breathing abdominal pain or trouble walking.  No recent illnesses.  He normally walks without assistance.  He has no neck or back pain he.  His laceration noted to his chin.  His blood pressure is noted to be elevated.     Review of patient's allergies indicates:  No Known Allergies       Past Medical History:   Diagnosis Date    Atrial fibrillation      Gout      Hyperlipidemia      Hypertension              Past Surgical History:   Procedure Laterality Date    CARDIOVERSION   3/15/16     SOPHIA prior      No family history on file.  Social History            Tobacco Use    Smoking status: Former       Types: Cigarettes       Start date: 1950       Quit date: 1970       Years since quittin.0    Smokeless tobacco: Never   Substance Use Topics    Alcohol use: Yes       Alcohol/week: 2.0 standard drinks       Types: 2 Cans of beer per week    Drug use: No      Review of Systems   Constitutional:   Negative for fever.   HENT:  Negative for sore throat.    Respiratory:  Negative for shortness of breath.    Cardiovascular:  Negative for chest pain.   Gastrointestinal:  Negative for nausea.   Genitourinary:  Negative for dysuria.   Musculoskeletal:  Negative for back pain.   Skin:  Positive for wound. Negative for rash.   Neurological:  Negative for weakness and headaches.   Hematological:  Bruises/bleeds easily.      Physical Exam             Initial Vitals [01/20/23 0035]   BP Pulse Resp Temp SpO2   (!) 207/108 (!) 57 18 97.9 °F (36.6 °C) 98 %       MAP           --              Physical Exam     Nursing note and vitals reviewed.  Constitutional: He appears well-developed and well-nourished. He is not diaphoretic. No distress.   HENT:   Head: Normocephalic and atraumatic.  No apparent trauma to head noted  Mouth/Throat: Oropharynx is clear and moist.   2 cm laceration to mid chin no intraoral laceration  Eyes: EOM are normal. Pupils are equal, round, and reactive to light.   Neck: No tracheal deviation present.   Cardiovascular:  Regular rhythm, normal heart sounds and intact distal pulses.           Pulmonary/Chest: Breath sounds normal. No stridor. No respiratory distress.   Abdominal: Abdomen is soft. He exhibits no distension and no mass. There is no abdominal tenderness.   Musculoskeletal:         General: No edema. Normal range of motion.      Comments: No apparent tenderness to palpation of his bilateral upper or lower extremities      Neurological: He is alert. He has normal strength. No cranial nerve deficit or sensory deficit.   Moving all extremities   Skin: Skin is warm and dry. Capillary refill takes less than 2 seconds. No rash noted.   Psychiatric: He has a normal mood and affect. His behavior is normal. Thought content normal.            ED Course   Lac Repair    Date/Time: 1/20/2023 2:38 AM  Performed by: Bakari Mora Jr., MD  Authorized by: Bakari Mora Jr., MD     Consent:     Consent  obtained:  Verbal    Consent given by:  Patient and spouse  Laceration details:     Location:  Face    Face location:  Chin    Length (cm):  2  Pre-procedure details:     Preparation:  Patient was prepped and draped in usual sterile fashion and imaging obtained to evaluate for foreign bodies  Exploration:     Hemostasis achieved with:  Direct pressure    Imaging outcome: foreign body not noted      Wound exploration: wound explored through full range of motion and entire depth of wound visualized      Wound extent: no foreign bodies/material noted, no muscle damage noted, no nerve damage noted, no tendon damage noted, no underlying fracture noted and no vascular damage noted    Treatment:     Amount of cleaning:  Standard    Irrigation solution:  Sterile saline    Irrigation method:  Pressure wash    Debridement:  None    Undermining:  None  Skin repair:     Repair method:  Sutures    Suture size:  4-0    Suture material:  Prolene    Suture technique:  Simple interrupted    Number of sutures:  4  Approximation:     Approximation:  Close  Repair type:     Repair type:  Simple  Post-procedure details:     Dressing:  Non-adherent dressing    Procedure completion:  Tolerated well, no immediate complications  Labs Reviewed   CBC W/ AUTO DIFFERENTIAL - Abnormal; Notable for the following components:       Result Value    RBC 3.89 (*)     Hemoglobin 12.0 (*)     Hematocrit 37.8 (*)     MCHC 31.7 (*)     Platelets 142 (*)     All other components within normal limits   BASIC METABOLIC PANEL - Abnormal; Notable for the following components:    Calcium 8.6 (*)     All other components within normal limits   PROTIME-INR - Abnormal; Notable for the following components:    Prothrombin Time 40.8 (*)     INR 4.1 (*)     All other components within normal limits          Imaging Results              CT Head Without Contrast (In process)                      CT Cervical Spine Without Contrast (In process)                      CT  Maxillofacial Without Contrast (In process)                      Medications   sodium chloride 0.9% bolus 250 mL 250 mL (250 mLs Intravenous New Bag 1/20/23 0159)   LIDOcaine (PF) 10 mg/ml (1%) injection 100 mg (100 mg Infiltration Given by Provider 1/20/23 0109)   LETS (LIDOcaine-TETRAcaine-EPINEPHrine) gel solution 1 mL (1 mL Topical (Top) Given 1/20/23 0106)   Tdap (BOOSTRIX) vaccine injection 0.5 mL (0.5 mLs Intramuscular Given 1/20/23 0107)   hydrALAZINE injection 10 mg (10 mg Intravenous Given 1/20/23 0114)     Medical Decision Making:   Differential Diagnosis:   Differential Diagnosis includes, but is not limited to:  Polytrauma, fall/syncope, traumatic SAH/intracranial bleed, skull/c-spine/facial fracture, concussion, neck injury, chest trauma, intraabdominal bleed, solid organ injury, pelvic fracture, long bone fracture/dislocation, nerve injury/palsy, vascular injury, hemarthrosis, septic joint, osteoarthritis, compartment syndrome, rhabdomyolysis, soft tissue contusion, muscle strain, ligament tear/sprain, foreign body, laceration, abrasion.    ED Management:  Patient is a pleasant 82-year-old man presenting today after a fall.  Initially unclear patient had a syncopal episode however on further questioning patient was drinking alcohol and fell, suspect mechanical fall.     Patient was observed in the ED without acute event.  His mental status has remained unchanged.  Imaging is negative for acute pathology.  His blood pressure improved with administration of IV hydralazine.     His chin laceration was sutured.    No emergent process was identified and the patient is stable for discharge at this time.  We discussed holding patient's Coumadin until he can follow-up with the [facility]in [facility].    Suture removal in 5-7 days      Strict ED return precautions given for headache change in mental status new pains fevers skin changes around his laceration or any other concerns.    After taking into careful account  the historical factors and physical exam findings of the patient's presentation today, in conjunction with the empirical and objective data obtained on ED workup, no acute emergent medical condition has been identified. The patient appears to be low risk for an emergent medical condition and I feel it is safe and appropriate at this time for the patient to be discharged to follow-up as detailed in their discharge instructions for reevaluation and possible continued outpatient workup and management. I have discussed the specifics of the workup with the patient and the patient has verbalized understanding of the details of the workup, the diagnosis, the treatment plan, and the need for outpatient follow-up.  Although the patient has no emergent etiology today this does not preclude the development of an emergent condition so in addition, I have advised the patient that they can return to the ED and/or activate EMS at any time with worsening of their symptoms, change of their symptoms, or with any other medical complaint.  The patient remained comfortable and stable during their visit in the ED.  Discharge and follow-up instructions discussed with the patient who expressed understanding and willingness to comply with my recommendations.             ED Course as of 01/20/23 0259   Fri Jan 20, 2023   0053 Pleasant 82-year-old man presented today with a fall.  He is on Coumadin.  It sounds like he lost his balance and had a slip and mechanical fall.  As patient takes Coumadin will plan to obtain labs including INR.  He has no pain apparent on exam.  His blood pressure is noted to be elevated. [RN]   0058 EKG:  Rate 57.  Sinus bradycardia.  Upright T-wave in V1.  LVH criteria No STEMI.  No ectopy.  No significant change from EKG 09/22/2022 [RN]   0114 CBC auto differential(!)  Hemoglobin with a 1 g drop compared to lab work from 3 months ago.  Thrombocytopenia noted however this is mildly improved compared to lab work from  3 months ago [RN]   0120 Informed by nursing staff that patient had and drinking beer prior to the fall.  Suspect mechanical fall this time.  Awaiting CT imaging.  [RN]   0122 Basic metabolic panel(!)  No significant abnormality [RN]   0133 Protime-INR(!) [RN]   0133 INR 4.1 super therapeutic.  Will advise patient hold his Coumadin until he can recheck his INR. [RN]   0228 Prelim CT without contrast of head read as negative for acute intracranial pathology [RN]   0236 Prelim read of CT maxillofacial without contrast:  No significant findings [RN]   0259 Prelim read of CT cervical spine without contrast:  No fracture dislocation [RN]      ED Course User Index  [RN] Bakari Mora Jr., MD                 Clinical Impression:   Final diagnoses:  [W19.XXXA] Fall       Portions of this note were dictated using voice recognition software and may contain dictation related errors in spelling/grammar/syntax not found on text review          Bakari Mora Jr., MD  01/20/23 0305       Bakari Mora Jr., MD  01/20/23 0307

## 2023-01-20 NOTE — ED PROVIDER NOTES
Encounter Date: 2023       History     Chief Complaint   Patient presents with    Fall     Pt arrives to the ED c/o falling at home. Pt go up to use the restroom and lost his balance and fell and hit his chin on the toilet. Denies LOC. Pt takes Coumadin. LAC under chin. Bleeding controlled.      Bright [place] is a 82 y.o. male who  has a past medical history of Atrial fibrillation, Gout, Hyperlipidemia, and Hypertension.    The patient presents to the ED due to a fall.  Patient is here with his wife and granddaughter.  They state that patient got up to go use the restroom when he lost his balance and fell on the floor.  Patient did not lose consciousness.  He reports no pain at this time.  Patient of note has a history of dementia however he is answering questions appropriately and is able to participate in the history and physical exam.  He does take Coumadin for atrial fibrillation.  He denies any neck or back pain chest pain trouble breathing abdominal pain or trouble walking.  No recent illnesses.  He normally walks without assistance.  He has no neck or back pain he.  His laceration noted to his chin.  His blood pressure is noted to be elevated.    Review of patient's allergies indicates:  No Known Allergies  Past Medical History:   Diagnosis Date    Atrial fibrillation     Gout     Hyperlipidemia     Hypertension      Past Surgical History:   Procedure Laterality Date    CARDIOVERSION  3/15/16    SOPHIA prior     No family history on file.  Social History     Tobacco Use    Smoking status: Former     Types: Cigarettes     Start date: 1950     Quit date: 1970     Years since quittin.0    Smokeless tobacco: Never   Substance Use Topics    Alcohol use: Yes     Alcohol/week: 2.0 standard drinks     Types: 2 Cans of beer per week    Drug use: No     Review of Systems   Constitutional:  Negative for fever.   HENT:  Negative for sore throat.    Respiratory:  Negative for shortness of breath.     Cardiovascular:  Negative for chest pain.   Gastrointestinal:  Negative for nausea.   Genitourinary:  Negative for dysuria.   Musculoskeletal:  Negative for back pain.   Skin:  Positive for wound. Negative for rash.   Neurological:  Negative for weakness and headaches.   Hematological:  Bruises/bleeds easily.     Physical Exam     Initial Vitals [01/20/23 0035]   BP Pulse Resp Temp SpO2   (!) 207/108 (!) 57 18 97.9 °F (36.6 °C) 98 %      MAP       --         Physical Exam    Nursing note and vitals reviewed.  Constitutional: He appears well-developed and well-nourished. He is not diaphoretic. No distress.   HENT:   Head: Normocephalic and atraumatic.   Mouth/Throat: Oropharynx is clear and moist.   2 cm laceration to mid chin   Eyes: EOM are normal. Pupils are equal, round, and reactive to light.   Neck: No tracheal deviation present.   Cardiovascular:  Regular rhythm, normal heart sounds and intact distal pulses.           Pulmonary/Chest: Breath sounds normal. No stridor. No respiratory distress.   Abdominal: Abdomen is soft. He exhibits no distension and no mass. There is no abdominal tenderness.   Musculoskeletal:         General: No edema. Normal range of motion.      Comments: No apparent tenderness to palpation of his bilateral upper or lower extremities     Neurological: He is alert. He has normal strength. No cranial nerve deficit or sensory deficit.   Moving all extremities   Skin: Skin is warm and dry. Capillary refill takes less than 2 seconds. No rash noted.   Psychiatric: He has a normal mood and affect. His behavior is normal. Thought content normal.       ED Course   Procedures  Labs Reviewed   CBC W/ AUTO DIFFERENTIAL   BASIC METABOLIC PANEL   PROTIME-INR          Imaging Results    None          Medications   LIDOcaine HCL 10 mg/ml (1%) injection 10 mL (has no administration in time range)   LETS (LIDOcaine-TETRAcaine-EPINEPHrine) gel solution 1 mL (has no administration in time range)   Tdap  (BOOSTRIX) vaccine injection 0.5 mL (has no administration in time range)   hydrALAZINE injection 10 mg (has no administration in time range)                              Clinical Impression:   Final diagnoses:  [W19.XXXA] Fall

## 2023-01-20 NOTE — DISCHARGE INSTRUCTIONS
Please hold his Coumadin today.  Please contact [facility]in Clinic regarding instructions when to resume his Coumadin.  Please have him follow-up with his primary care physician in the next couple of days.  Please have his sutures removed in 5-7 days.  Please return if he has any worrisome symptoms including mental status change signs of infection or if you have any other concerns.

## 2023-01-24 NOTE — PROGRESS NOTES
1/24/23 Granddaughter called to report Patient is on his way to get INR drawn today, it was missed 1/23

## 2023-01-26 ENCOUNTER — ANTI-COAG VISIT (OUTPATIENT)
Dept: CARDIOLOGY | Facility: CLINIC | Age: 83
End: 2023-01-26
Payer: MEDICARE

## 2023-01-26 DIAGNOSIS — Z79.01 LONG TERM CURRENT USE OF ANTICOAGULANT THERAPY: Primary | ICD-10-CM

## 2023-01-26 LAB — INR PPP: 3.4

## 2023-01-26 PROCEDURE — 93793 PR ANTICOAGULANT MGMT FOR PT TAKING WARFARIN: ICD-10-PCS | Mod: S$GLB,,,

## 2023-01-26 PROCEDURE — 93793 ANTICOAG MGMT PT WARFARIN: CPT | Mod: S$GLB,,,

## 2023-01-26 NOTE — PROGRESS NOTES
INR not at goal. Medications, chart, and patient findings reviewed. See calendar for adjustments to dose and follow up plan.  Pt recently seen in the ED for fall. Recommend to decrease maintenance dose & re-assess.

## 2023-02-01 ENCOUNTER — ANTI-COAG VISIT (OUTPATIENT)
Dept: CARDIOLOGY | Facility: CLINIC | Age: 83
End: 2023-02-01
Payer: MEDICARE

## 2023-02-01 DIAGNOSIS — Z79.01 LONG TERM CURRENT USE OF ANTICOAGULANT THERAPY: Primary | ICD-10-CM

## 2023-02-01 LAB — INR PPP: 2.7

## 2023-02-01 PROCEDURE — 93793 ANTICOAG MGMT PT WARFARIN: CPT | Mod: S$GLB,,,

## 2023-02-01 PROCEDURE — 93793 PR ANTICOAGULANT MGMT FOR PT TAKING WARFARIN: ICD-10-PCS | Mod: S$GLB,,,

## 2023-02-06 ENCOUNTER — PATIENT MESSAGE (OUTPATIENT)
Dept: PSYCHIATRY | Facility: CLINIC | Age: 83
End: 2023-02-06
Payer: MEDICARE

## 2023-02-07 ENCOUNTER — PATIENT MESSAGE (OUTPATIENT)
Dept: PSYCHIATRY | Facility: CLINIC | Age: 83
End: 2023-02-07
Payer: MEDICARE

## 2023-02-07 RX ORDER — MEMANTINE HYDROCHLORIDE 5 MG/1
5 TABLET ORAL 2 TIMES DAILY
Qty: 180 TABLET | Refills: 0 | Status: SHIPPED | OUTPATIENT
Start: 2023-02-07 | End: 2023-04-26 | Stop reason: SDUPTHER

## 2023-02-28 ENCOUNTER — OUTSIDE PLACE OF SERVICE (OUTPATIENT)
Dept: CARDIOLOGY | Facility: CLINIC | Age: 83
End: 2023-02-28
Payer: MEDICARE

## 2023-02-28 ENCOUNTER — ANTI-COAG VISIT (OUTPATIENT)
Dept: CARDIOLOGY | Facility: CLINIC | Age: 83
End: 2023-02-28
Payer: MEDICARE

## 2023-02-28 DIAGNOSIS — Z79.01 LONG TERM CURRENT USE OF ANTICOAGULANT THERAPY: Primary | ICD-10-CM

## 2023-02-28 LAB — INR PPP: 3.8

## 2023-02-28 PROCEDURE — 93793 ANTICOAG MGMT PT WARFARIN: CPT | Mod: S$GLB,,,

## 2023-02-28 PROCEDURE — 93010 PR ELECTROCARDIOGRAM REPORT: ICD-10-PCS | Mod: ,,, | Performed by: INTERNAL MEDICINE

## 2023-02-28 PROCEDURE — 93793 PR ANTICOAGULANT MGMT FOR PT TAKING WARFARIN: ICD-10-PCS | Mod: S$GLB,,,

## 2023-02-28 PROCEDURE — 93010 ELECTROCARDIOGRAM REPORT: CPT | Mod: ,,, | Performed by: INTERNAL MEDICINE

## 2023-02-28 NOTE — PROGRESS NOTES
INR not at goal. Medications, chart, and patient findings reviewed. See calendar for adjustments to dose and follow up plan.  INR from 2/16. Recommend pt hold warfarin & re-assess ASAP.

## 2023-03-01 LAB — INR PPP: 3.09

## 2023-03-02 ENCOUNTER — ANTI-COAG VISIT (OUTPATIENT)
Dept: CARDIOLOGY | Facility: CLINIC | Age: 83
End: 2023-03-02
Payer: MEDICARE

## 2023-03-02 DIAGNOSIS — Z79.01 LONG TERM CURRENT USE OF ANTICOAGULANT THERAPY: Primary | ICD-10-CM

## 2023-03-02 PROCEDURE — 93793 PR ANTICOAGULANT MGMT FOR PT TAKING WARFARIN: ICD-10-PCS | Mod: S$GLB,,,

## 2023-03-02 PROCEDURE — 93793 ANTICOAG MGMT PT WARFARIN: CPT | Mod: S$GLB,,,

## 2023-03-02 NOTE — PROGRESS NOTES
Spoke with pt's granddaughter, [place]. She reports pt was admitted to [facility] 2/28-3/2 for pneumonia. Per Pharmacist Bright at [facility], pt was prescribed Omnicef 300mg Q12 X 7 days and Z-pack X 3 days. Medication has not been picked up yet but granddaughter will pick it up tonight. Pt had INR drawn at [facility] [place]- [facility] reports PT-38.2 INR 3.09 on 3/1. Hardcopy to be faxed.

## 2023-03-13 LAB — INR PPP: 4.4

## 2023-03-15 ENCOUNTER — ANTI-COAG VISIT (OUTPATIENT)
Dept: CARDIOLOGY | Facility: CLINIC | Age: 83
End: 2023-03-15
Payer: MEDICARE

## 2023-03-15 DIAGNOSIS — Z79.01 LONG TERM CURRENT USE OF ANTICOAGULANT THERAPY: Primary | ICD-10-CM

## 2023-03-15 PROCEDURE — 93793 PR ANTICOAGULANT MGMT FOR PT TAKING WARFARIN: ICD-10-PCS | Mod: S$GLB,,,

## 2023-03-15 PROCEDURE — 93793 ANTICOAG MGMT PT WARFARIN: CPT | Mod: S$GLB,,,

## 2023-03-20 ENCOUNTER — HOSPITAL ENCOUNTER (OUTPATIENT)
Facility: HOSPITAL | Age: 83
Discharge: HOME | End: 2023-03-21
Attending: EMERGENCY MEDICINE | Admitting: INTERNAL MEDICINE
Payer: MEDICARE

## 2023-03-20 DIAGNOSIS — D64.9 NORMOCYTIC ANEMIA: ICD-10-CM

## 2023-03-20 DIAGNOSIS — N17.9 AKI (ACUTE KIDNEY INJURY): ICD-10-CM

## 2023-03-20 DIAGNOSIS — R42 POSTURAL DIZZINESS WITH PRESYNCOPE: ICD-10-CM

## 2023-03-20 DIAGNOSIS — I95.1 ORTHOSTATIC HYPOTENSION: Primary | ICD-10-CM

## 2023-03-20 DIAGNOSIS — R55 SYNCOPE: ICD-10-CM

## 2023-03-20 DIAGNOSIS — R79.1 SUPRATHERAPEUTIC INR: ICD-10-CM

## 2023-03-20 DIAGNOSIS — I10 SUPINE HYPERTENSION: ICD-10-CM

## 2023-03-20 DIAGNOSIS — R55 POSTURAL DIZZINESS WITH PRESYNCOPE: ICD-10-CM

## 2023-03-20 DIAGNOSIS — Z79.01 ANTICOAGULATED WITH WARFARIN: ICD-10-CM

## 2023-03-20 DIAGNOSIS — I48.19 PERSISTENT ATRIAL FIBRILLATION: ICD-10-CM

## 2023-03-20 LAB
ALBUMIN SERPL BCP-MCNC: 4 G/DL (ref 3.5–5.2)
ALP SERPL-CCNC: 128 U/L (ref 38–126)
ALT SERPL W/O P-5'-P-CCNC: 18 U/L (ref 10–44)
ANION GAP SERPL CALC-SCNC: 5 MMOL/L (ref 8–16)
AST SERPL-CCNC: 27 U/L (ref 15–46)
BASOPHILS # BLD AUTO: 0.05 K/UL (ref 0–0.2)
BASOPHILS NFR BLD: 1 % (ref 0–1.9)
BILIRUB SERPL-MCNC: 0.8 MG/DL (ref 0.1–1)
CALCIUM SERPL-MCNC: 8.8 MG/DL (ref 8.7–10.5)
CHLORIDE SERPL-SCNC: 106 MMOL/L (ref 95–110)
CO2 SERPL-SCNC: 28 MMOL/L (ref 23–29)
CREAT SERPL-MCNC: 1.79 MG/DL (ref 0.5–1.4)
DIFFERENTIAL METHOD: ABNORMAL
EOSINOPHIL # BLD AUTO: 0.2 K/UL (ref 0–0.5)
EOSINOPHIL NFR BLD: 3.6 % (ref 0–8)
ERYTHROCYTE [DISTWIDTH] IN BLOOD BY AUTOMATED COUNT: 13.2 % (ref 11.5–14.5)
EST. GFR  (NO RACE VARIABLE): 37.1 ML/MIN/1.73 M^2
GLUCOSE SERPL-MCNC: 95 MG/DL (ref 70–110)
HCT VFR BLD AUTO: 37.9 % (ref 40–54)
HGB BLD-MCNC: 12.2 G/DL (ref 14–18)
IMM GRANULOCYTES # BLD AUTO: 0.01 K/UL (ref 0–0.04)
IMM GRANULOCYTES NFR BLD AUTO: 0.2 % (ref 0–0.5)
INR PPP: 3.8 (ref 0.8–1.2)
LYMPHOCYTES # BLD AUTO: 1.3 K/UL (ref 1–4.8)
LYMPHOCYTES NFR BLD: 25.9 % (ref 18–48)
MCH RBC QN AUTO: 31.4 PG (ref 27–31)
MCHC RBC AUTO-ENTMCNC: 32.2 G/DL (ref 32–36)
MCV RBC AUTO: 97 FL (ref 82–98)
MONOCYTES # BLD AUTO: 0.7 K/UL (ref 0.3–1)
MONOCYTES NFR BLD: 13.6 % (ref 4–15)
NEUTROPHILS # BLD AUTO: 2.8 K/UL (ref 1.8–7.7)
NEUTROPHILS NFR BLD: 55.7 % (ref 38–73)
NRBC BLD-RTO: 0 /100 WBC
PLATELET # BLD AUTO: 171 K/UL (ref 150–450)
PMV BLD AUTO: 11.4 FL (ref 9.2–12.9)
POTASSIUM SERPL-SCNC: 4 MMOL/L (ref 3.5–5.1)
PROT SERPL-MCNC: 7.2 G/DL (ref 6–8.4)
PROTHROMBIN TIME: 38.2 SEC (ref 9–12.5)
RBC # BLD AUTO: 3.89 M/UL (ref 4.6–6.2)
SODIUM SERPL-SCNC: 139 MMOL/L (ref 136–145)
UUN UR-MCNC: 16 MG/DL (ref 2–20)
WBC # BLD AUTO: 5.01 K/UL (ref 3.9–12.7)

## 2023-03-20 PROCEDURE — 93010 EKG 12-LEAD: ICD-10-PCS | Mod: ,,, | Performed by: INTERNAL MEDICINE

## 2023-03-20 PROCEDURE — 63600175 PHARM REV CODE 636 W HCPCS: Mod: ER | Performed by: EMERGENCY MEDICINE

## 2023-03-20 PROCEDURE — 96374 THER/PROPH/DIAG INJ IV PUSH: CPT | Mod: ER

## 2023-03-20 PROCEDURE — 25000003 PHARM REV CODE 250: Mod: ER | Performed by: EMERGENCY MEDICINE

## 2023-03-20 PROCEDURE — 80053 COMPREHEN METABOLIC PANEL: CPT | Mod: ER | Performed by: EMERGENCY MEDICINE

## 2023-03-20 PROCEDURE — 99285 EMERGENCY DEPT VISIT HI MDM: CPT | Mod: ER,25

## 2023-03-20 PROCEDURE — 96376 TX/PRO/DX INJ SAME DRUG ADON: CPT

## 2023-03-20 PROCEDURE — 63600175 PHARM REV CODE 636 W HCPCS: Performed by: STUDENT IN AN ORGANIZED HEALTH CARE EDUCATION/TRAINING PROGRAM

## 2023-03-20 PROCEDURE — 93005 ELECTROCARDIOGRAM TRACING: CPT | Mod: ER

## 2023-03-20 PROCEDURE — G0378 HOSPITAL OBSERVATION PER HR: HCPCS

## 2023-03-20 PROCEDURE — 96361 HYDRATE IV INFUSION ADD-ON: CPT | Mod: ER

## 2023-03-20 PROCEDURE — 85025 COMPLETE CBC W/AUTO DIFF WBC: CPT | Mod: ER | Performed by: EMERGENCY MEDICINE

## 2023-03-20 PROCEDURE — 85610 PROTHROMBIN TIME: CPT | Mod: ER | Performed by: EMERGENCY MEDICINE

## 2023-03-20 PROCEDURE — 93010 ELECTROCARDIOGRAM REPORT: CPT | Mod: ,,, | Performed by: INTERNAL MEDICINE

## 2023-03-20 RX ORDER — HYDRALAZINE HYDROCHLORIDE 20 MG/ML
5 INJECTION INTRAMUSCULAR; INTRAVENOUS ONCE
Status: DISCONTINUED | OUTPATIENT
Start: 2023-03-20 | End: 2023-03-20

## 2023-03-20 RX ORDER — CEFDINIR 300 MG/1
300 CAPSULE ORAL EVERY 12 HOURS
COMMUNITY
Start: 2023-03-02 | End: 2023-03-20

## 2023-03-20 RX ORDER — NITROGLYCERIN 0.4 MG/1
0.4 TABLET SUBLINGUAL
COMMUNITY
End: 2023-03-20 | Stop reason: SDUPTHER

## 2023-03-20 RX ORDER — AZITHROMYCIN 500 MG/1
500 TABLET, FILM COATED ORAL
COMMUNITY
Start: 2023-03-02 | End: 2023-03-20

## 2023-03-20 RX ORDER — METOPROLOL TARTRATE 25 MG/1
25 TABLET, FILM COATED ORAL 2 TIMES DAILY
Status: ON HOLD | COMMUNITY
Start: 2023-02-24 | End: 2023-03-21 | Stop reason: HOSPADM

## 2023-03-20 RX ORDER — CITALOPRAM 10 MG/1
10 TABLET ORAL
COMMUNITY
Start: 2022-11-28 | End: 2023-03-20 | Stop reason: DRUGHIGH

## 2023-03-20 RX ORDER — GABAPENTIN 300 MG/1
300 CAPSULE ORAL 3 TIMES DAILY
COMMUNITY
Start: 2023-01-20

## 2023-03-20 RX ORDER — ATORVASTATIN CALCIUM 40 MG/1
40 TABLET, FILM COATED ORAL NIGHTLY
COMMUNITY

## 2023-03-20 RX ORDER — SODIUM CHLORIDE 0.9 % (FLUSH) 0.9 %
10 SYRINGE (ML) INJECTION
Status: DISCONTINUED | OUTPATIENT
Start: 2023-03-20 | End: 2023-03-21 | Stop reason: HOSPADM

## 2023-03-20 RX ORDER — FOLIC ACID 1 MG/1
1 TABLET ORAL DAILY
COMMUNITY
Start: 2023-03-02 | End: 2023-04-26

## 2023-03-20 RX ORDER — SODIUM CHLORIDE 9 MG/ML
500 INJECTION, SOLUTION INTRAVENOUS
Status: COMPLETED | OUTPATIENT
Start: 2023-03-20 | End: 2023-03-20

## 2023-03-20 RX ORDER — WARFARIN 4 MG/1
TABLET ORAL DAILY
COMMUNITY

## 2023-03-20 RX ORDER — HYDRALAZINE HYDROCHLORIDE 20 MG/ML
10 INJECTION INTRAMUSCULAR; INTRAVENOUS
Status: COMPLETED | OUTPATIENT
Start: 2023-03-20 | End: 2023-03-20

## 2023-03-20 RX ORDER — HYDRALAZINE HYDROCHLORIDE 20 MG/ML
2 INJECTION INTRAMUSCULAR; INTRAVENOUS ONCE
Status: DISCONTINUED | OUTPATIENT
Start: 2023-03-21 | End: 2023-03-20

## 2023-03-20 RX ORDER — POTASSIUM CHLORIDE 750 MG/1
20 TABLET, EXTENDED RELEASE ORAL
COMMUNITY
End: 2023-03-20 | Stop reason: DRUGHIGH

## 2023-03-20 RX ORDER — CITALOPRAM 20 MG/1
20 TABLET, FILM COATED ORAL DAILY
COMMUNITY
Start: 2023-01-02 | End: 2023-04-26 | Stop reason: SDUPTHER

## 2023-03-20 RX ORDER — HYDRALAZINE HYDROCHLORIDE 20 MG/ML
10 INJECTION INTRAMUSCULAR; INTRAVENOUS ONCE
Status: COMPLETED | OUTPATIENT
Start: 2023-03-20 | End: 2023-03-20

## 2023-03-20 RX ORDER — HYDRALAZINE HYDROCHLORIDE 20 MG/ML
5 INJECTION INTRAMUSCULAR; INTRAVENOUS ONCE
Status: DISCONTINUED | OUTPATIENT
Start: 2023-03-21 | End: 2023-03-20

## 2023-03-20 RX ORDER — NAPROXEN SODIUM 220 MG/1
TABLET, FILM COATED ORAL
COMMUNITY
Start: 2022-12-24

## 2023-03-20 RX ORDER — POTASSIUM CHLORIDE 750 MG/1
10 TABLET, EXTENDED RELEASE ORAL 2 TIMES DAILY
COMMUNITY
Start: 2022-10-26 | End: 2023-03-20 | Stop reason: DRUGHIGH

## 2023-03-20 RX ORDER — LOSARTAN POTASSIUM 50 MG/1
50 TABLET ORAL
COMMUNITY
End: 2023-03-20 | Stop reason: SDUPTHER

## 2023-03-20 RX ADMIN — HYDRALAZINE HYDROCHLORIDE 10 MG: 20 INJECTION, SOLUTION INTRAMUSCULAR; INTRAVENOUS at 06:03

## 2023-03-20 RX ADMIN — SODIUM CHLORIDE 500 ML: 0.9 INJECTION, SOLUTION INTRAVENOUS at 03:03

## 2023-03-20 RX ADMIN — SODIUM CHLORIDE 500 ML: 0.9 INJECTION, SOLUTION INTRAVENOUS at 02:03

## 2023-03-20 RX ADMIN — HYDRALAZINE HYDROCHLORIDE 10 MG: 20 INJECTION, SOLUTION INTRAMUSCULAR; INTRAVENOUS at 11:03

## 2023-03-20 NOTE — Clinical Note
Diagnosis: Syncope [[id number]]   Future Attending Provider: PAUL NOGUEIRA [[id number]]   Admitting Provider:: PAUL NOGUEIRA [[id number]]

## 2023-03-20 NOTE — ED NOTES
Pt became very unsteady during orthostatics while standing requiring assistance to get back into bed.

## 2023-03-20 NOTE — ED PROVIDER NOTES
Encounter Date: 3/20/2023       History     Chief Complaint   Patient presents with    Loss of Consciousness     PT was getting blood drawn at doctors office and passed out. PT arrived via [email or web address]     82-year-old male with a history of atrial fibrillation on Coumadin, gout, hyperlipidemia, hypertension presents after patient was getting blood drawn at his doctor's office.  Patient says he was just getting normal blood draws.  He denies chest pain, shortness of breath, headache, abdominal pain, nausea, vomiting, dizziness.  Currently says he feels fine.  Patient says that he is scared of blood.    Review of patient's allergies indicates:  No Known Allergies  Past Medical History:   Diagnosis Date    Atrial fibrillation     Gout     Hyperlipidemia     Hypertension      Past Surgical History:   Procedure Laterality Date    CARDIOVERSION  3/15/16    SOPHIA prior     History reviewed. No pertinent family history.  Social History     Tobacco Use    Smoking status: Former     Types: Cigarettes     Start date: 1950     Quit date: 1970     Years since quittin.2    Smokeless tobacco: Never   Substance Use Topics    Alcohol use: Yes     Alcohol/week: 2.0 standard drinks     Types: 2 Cans of beer per week    Drug use: No     Review of Systems   Constitutional:  Negative for appetite change.   Eyes:  Negative for pain.   Respiratory:  Negative for shortness of breath.    Cardiovascular:  Negative for chest pain.   Gastrointestinal:  Negative for abdominal pain, nausea and vomiting.   Genitourinary:  Negative for frequency.   Musculoskeletal:  Negative for arthralgias and neck pain.   Neurological:  Positive for syncope. Negative for headaches.   Psychiatric/Behavioral:  Negative for confusion.      Physical Exam     Initial Vitals [23 1416]   BP Pulse Resp Temp SpO2   107/67 63 18 97.6 °F (36.4 °C) 98 %      MAP       --         Physical Exam    Nursing note and vitals reviewed.  HENT:   Head: Atraumatic.    Eyes: Conjunctivae and EOM are normal.   Neck:   Normal range of motion.  Cardiovascular:      Exam reveals no gallop and no friction rub.       No murmur heard.  Pulmonary/Chest: Breath sounds normal. No respiratory distress. He has no wheezes. He has no rales.   Abdominal: Abdomen is soft. Bowel sounds are normal. He exhibits no distension. There is no abdominal tenderness.   Musculoskeletal:         General: No edema. Normal range of motion.      Cervical back: Normal range of motion.     Neurological: He is alert and oriented to person, place, and time. He has normal strength. No cranial nerve deficit or sensory deficit.   Skin: Skin is warm and dry.   Psychiatric: He has a normal mood and affect.       ED Course   Procedures  Labs Reviewed   CBC W/ AUTO DIFFERENTIAL - Abnormal; Notable for the following components:       Result Value    RBC 3.89 (*)     Hemoglobin 12.2 (*)     Hematocrit 37.9 (*)     MCH 31.4 (*)     All other components within normal limits   COMPREHENSIVE METABOLIC PANEL - Abnormal; Notable for the following components:    Creatinine 1.79 (*)     Alkaline Phosphatase 128 (*)     Anion Gap 5 (*)     eGFR 37.1 (*)     All other components within normal limits   PROTIME-INR - Abnormal; Notable for the following components:    Prothrombin Time 38.2 (*)     INR 3.8 (*)     All other components within normal limits          Imaging Results    None          Medications   0.9%  NaCl infusion (0 mLs Intravenous Stopped 3/20/23 1537)   0.9%  NaCl infusion (0 mLs Intravenous Stopped 3/20/23 1620)     Medical Decision Making:   Initial Assessment:   82-year-old male presenting after syncopal episode while getting blood drawn.  Vital signs unremarkable.  Neuro intact.  No complaints at this time.           ED Course as of 03/20/23 1650   Mon Mar 20, 2023   1518 Patient orthostatic.  IV fluids started. [SN]   1645 Creatinine(!): 1.79 [SN]   1645 RHINA up from 1.1 in january [SN]   1645 CBC Auto  Differential(!) [SN]   1645 Protime-INR(!) [SN]   1649 After a L of IV fluids, patient still orthostatic with a drop of BP from 180 to 114 systolic.  Will admit for IV fluids. [SN]      ED Course User Index  [SN] Casey Mora MD                   Clinical Impression:   Final diagnoses:  [R55] Syncope (Primary)  [N17.9] RHINA (acute kidney injury)  [I95.1] Orthostatic hypotension        ED Disposition Condition    Observation Stable                Casey Mora MD  03/20/23 1649       Casey Mora MD  03/20/23 1654

## 2023-03-21 VITALS
DIASTOLIC BLOOD PRESSURE: 64 MMHG | BODY MASS INDEX: 22.93 KG/M2 | WEIGHT: 173 LBS | TEMPERATURE: 98 F | HEIGHT: 73 IN | SYSTOLIC BLOOD PRESSURE: 119 MMHG | RESPIRATION RATE: 20 BRPM | HEART RATE: 83 BPM | OXYGEN SATURATION: 98 %

## 2023-03-21 PROBLEM — D64.9 NORMOCYTIC ANEMIA: Status: ACTIVE | Noted: 2023-03-21

## 2023-03-21 PROBLEM — R55 SYNCOPE: Status: RESOLVED | Noted: 2023-03-20 | Resolved: 2023-03-21

## 2023-03-21 PROBLEM — R79.1 SUPRATHERAPEUTIC INR: Status: ACTIVE | Noted: 2023-03-21

## 2023-03-21 PROBLEM — I48.0 PAF (PAROXYSMAL ATRIAL FIBRILLATION): Status: RESOLVED | Noted: 2017-03-31 | Resolved: 2023-03-21

## 2023-03-21 PROBLEM — R55 POSTURAL DIZZINESS WITH PRESYNCOPE: Status: ACTIVE | Noted: 2023-03-21

## 2023-03-21 PROBLEM — R42 POSTURAL DIZZINESS WITH PRESYNCOPE: Status: ACTIVE | Noted: 2023-03-21

## 2023-03-21 LAB
ALBUMIN SERPL BCP-MCNC: 3.1 G/DL (ref 3.5–5.2)
ALP SERPL-CCNC: 117 U/L (ref 55–135)
ALT SERPL W/O P-5'-P-CCNC: 10 U/L (ref 10–44)
ANION GAP SERPL CALC-SCNC: 9 MMOL/L (ref 8–16)
ASCENDING AORTA: 2.98 CM
AST SERPL-CCNC: 14 U/L (ref 10–40)
AV INDEX (PROSTH): 0.75
AV MEAN GRADIENT: 8 MMHG
AV PEAK GRADIENT: 13 MMHG
AV VALVE AREA: 2.69 CM2
AV VELOCITY RATIO: 0.81
BASOPHILS # BLD AUTO: 0.03 K/UL (ref 0–0.2)
BASOPHILS NFR BLD: 0.5 % (ref 0–1.9)
BILIRUB SERPL-MCNC: 0.5 MG/DL (ref 0.1–1)
BSA FOR ECHO PROCEDURE: 2.01 M2
BUN SERPL-MCNC: 15 MG/DL (ref 8–23)
CALCIUM SERPL-MCNC: 8.3 MG/DL (ref 8.7–10.5)
CHLORIDE SERPL-SCNC: 108 MMOL/L (ref 95–110)
CO2 SERPL-SCNC: 23 MMOL/L (ref 23–29)
CREAT SERPL-MCNC: 1.3 MG/DL (ref 0.5–1.4)
CV ECHO LV RWT: 0.7 CM
DIFFERENTIAL METHOD: ABNORMAL
DOP CALC AO PEAK VEL: 1.78 M/S
DOP CALC AO VTI: 36.6 CM
DOP CALC LVOT AREA: 3.6 CM2
DOP CALC LVOT DIAMETER: 2.14 CM
DOP CALC LVOT PEAK VEL: 1.44 M/S
DOP CALC LVOT STROKE VOLUME: 98.5 CM3
DOP CALC MV VTI: 27.1 CM
DOP CALCLVOT PEAK VEL VTI: 27.4 CM
E WAVE DECELERATION TIME: 361.92 MSEC
E/A RATIO: 0.46
E/E' RATIO: 11.8 M/S
ECHO LV POSTERIOR WALL: 1.38 CM (ref 0.6–1.1)
EJECTION FRACTION: 65 %
EOSINOPHIL # BLD AUTO: 0.2 K/UL (ref 0–0.5)
EOSINOPHIL NFR BLD: 2.7 % (ref 0–8)
ERYTHROCYTE [DISTWIDTH] IN BLOOD BY AUTOMATED COUNT: 13.5 % (ref 11.5–14.5)
EST. GFR  (NO RACE VARIABLE): 55 ML/MIN/1.73 M^2
FERRITIN SERPL-MCNC: 75 NG/ML (ref 20–300)
FRACTIONAL SHORTENING: 34 % (ref 28–44)
GLUCOSE SERPL-MCNC: 99 MG/DL (ref 70–110)
HCT VFR BLD AUTO: 34.5 % (ref 40–54)
HGB BLD-MCNC: 11.2 G/DL (ref 14–18)
IMM GRANULOCYTES # BLD AUTO: 0.02 K/UL (ref 0–0.04)
IMM GRANULOCYTES NFR BLD AUTO: 0.4 % (ref 0–0.5)
INR PPP: 3.9 (ref 0.8–1.2)
INTERVENTRICULAR SEPTUM: 1.81 CM (ref 0.6–1.1)
IRON SERPL-MCNC: 58 UG/DL (ref 45–160)
IVC DIAMETER: 1.68 CM
LA MAJOR: 6.05 CM
LA MINOR: 4.51 CM
LA WIDTH: 4 CM
LEFT ATRIUM SIZE: 3.55 CM
LEFT ATRIUM VOLUME INDEX MOD: 28.1 ML/M2
LEFT ATRIUM VOLUME INDEX: 30.9 ML/M2
LEFT ATRIUM VOLUME MOD: 56.75 CM3
LEFT ATRIUM VOLUME: 62.37 CM3
LEFT INTERNAL DIMENSION IN SYSTOLE: 2.58 CM (ref 2.1–4)
LEFT VENTRICLE DIASTOLIC VOLUME INDEX: 33.01 ML/M2
LEFT VENTRICLE DIASTOLIC VOLUME: 66.69 ML
LEFT VENTRICLE MASS INDEX: 124 G/M2
LEFT VENTRICLE SYSTOLIC VOLUME INDEX: 11.9 ML/M2
LEFT VENTRICLE SYSTOLIC VOLUME: 24.13 ML
LEFT VENTRICULAR INTERNAL DIMENSION IN DIASTOLE: 3.92 CM (ref 3.5–6)
LEFT VENTRICULAR MASS: 249.53 G
LV LATERAL E/E' RATIO: 8.43 M/S
LV SEPTAL E/E' RATIO: 19.67 M/S
LVOT MG: 3.84 MMHG
LVOT MV: 0.88 CM/S
LYMPHOCYTES # BLD AUTO: 1.6 K/UL (ref 1–4.8)
LYMPHOCYTES NFR BLD: 28.5 % (ref 18–48)
MAGNESIUM SERPL-MCNC: 1.7 MG/DL (ref 1.6–2.6)
MCH RBC QN AUTO: 30.8 PG (ref 27–31)
MCHC RBC AUTO-ENTMCNC: 32.5 G/DL (ref 32–36)
MCV RBC AUTO: 95 FL (ref 82–98)
MONOCYTES # BLD AUTO: 0.6 K/UL (ref 0.3–1)
MONOCYTES NFR BLD: 10.6 % (ref 4–15)
MV A" WAVE DURATION": 144.62 MSEC
MV MEAN GRADIENT: 1 MMHG
MV PEAK A VEL: 1.28 M/S
MV PEAK E VEL: 0.59 M/S
MV PEAK GRADIENT: 7 MMHG
MV STENOSIS PRESSURE HALF TIME: 104.96 MS
MV VALVE AREA BY CONTINUITY EQUATION: 3.63 CM2
MV VALVE AREA P 1/2 METHOD: 2.1 CM2
NEUTROPHILS # BLD AUTO: 3.1 K/UL (ref 1.8–7.7)
NEUTROPHILS NFR BLD: 57.3 % (ref 38–73)
NRBC BLD-RTO: 0 /100 WBC
PHOSPHATE SERPL-MCNC: 2.6 MG/DL (ref 2.7–4.5)
PISA TR MAX VEL: 2.04 M/S
PLATELET # BLD AUTO: 129 K/UL (ref 150–450)
PMV BLD AUTO: 10.5 FL (ref 9.2–12.9)
POTASSIUM SERPL-SCNC: 3.8 MMOL/L (ref 3.5–5.1)
PROT SERPL-MCNC: 6.1 G/DL (ref 6–8.4)
PROTHROMBIN TIME: 37.1 SEC (ref 9–12.5)
PV MV: 0.83 M/S
PV PEAK VELOCITY: 0.95 CM/S
RA MAJOR: 4.35 CM
RA PRESSURE: 3 MMHG
RA WIDTH: 3.3 CM
RBC # BLD AUTO: 3.64 M/UL (ref 4.6–6.2)
RIGHT VENTRICULAR END-DIASTOLIC DIMENSION: 3.07 CM
SATURATED IRON: 21 % (ref 20–50)
SODIUM SERPL-SCNC: 140 MMOL/L (ref 136–145)
STJ: 3.07 CM
TDI LATERAL: 0.07 M/S
TDI SEPTAL: 0.03 M/S
TDI: 0.05 M/S
TOTAL IRON BINDING CAPACITY: 280 UG/DL (ref 250–450)
TR MAX PG: 17 MMHG
TRANSFERRIN SERPL-MCNC: 189 MG/DL (ref 200–375)
TV REST PULMONARY ARTERY PRESSURE: 20 MMHG
VIT B12 SERPL-MCNC: 509 PG/ML (ref 210–950)
WBC # BLD AUTO: 5.47 K/UL (ref 3.9–12.7)

## 2023-03-21 PROCEDURE — 63600175 PHARM REV CODE 636 W HCPCS

## 2023-03-21 PROCEDURE — 80053 COMPREHEN METABOLIC PANEL: CPT | Performed by: STUDENT IN AN ORGANIZED HEALTH CARE EDUCATION/TRAINING PROGRAM

## 2023-03-21 PROCEDURE — 93005 ELECTROCARDIOGRAM TRACING: CPT

## 2023-03-21 PROCEDURE — 84100 ASSAY OF PHOSPHORUS: CPT | Performed by: STUDENT IN AN ORGANIZED HEALTH CARE EDUCATION/TRAINING PROGRAM

## 2023-03-21 PROCEDURE — 97162 PT EVAL MOD COMPLEX 30 MIN: CPT

## 2023-03-21 PROCEDURE — 83735 ASSAY OF MAGNESIUM: CPT | Performed by: STUDENT IN AN ORGANIZED HEALTH CARE EDUCATION/TRAINING PROGRAM

## 2023-03-21 PROCEDURE — 97530 THERAPEUTIC ACTIVITIES: CPT

## 2023-03-21 PROCEDURE — 36415 COLL VENOUS BLD VENIPUNCTURE: CPT | Performed by: STUDENT IN AN ORGANIZED HEALTH CARE EDUCATION/TRAINING PROGRAM

## 2023-03-21 PROCEDURE — 99900035 HC TECH TIME PER 15 MIN (STAT)

## 2023-03-21 PROCEDURE — 93010 EKG 12-LEAD: ICD-10-PCS | Mod: ,,, | Performed by: INTERNAL MEDICINE

## 2023-03-21 PROCEDURE — 82607 VITAMIN B-12: CPT | Performed by: STUDENT IN AN ORGANIZED HEALTH CARE EDUCATION/TRAINING PROGRAM

## 2023-03-21 PROCEDURE — 25000003 PHARM REV CODE 250: Performed by: STUDENT IN AN ORGANIZED HEALTH CARE EDUCATION/TRAINING PROGRAM

## 2023-03-21 PROCEDURE — G0378 HOSPITAL OBSERVATION PER HR: HCPCS

## 2023-03-21 PROCEDURE — 84466 ASSAY OF TRANSFERRIN: CPT | Performed by: STUDENT IN AN ORGANIZED HEALTH CARE EDUCATION/TRAINING PROGRAM

## 2023-03-21 PROCEDURE — 25000003 PHARM REV CODE 250

## 2023-03-21 PROCEDURE — 85610 PROTHROMBIN TIME: CPT | Performed by: STUDENT IN AN ORGANIZED HEALTH CARE EDUCATION/TRAINING PROGRAM

## 2023-03-21 PROCEDURE — 82728 ASSAY OF FERRITIN: CPT | Performed by: STUDENT IN AN ORGANIZED HEALTH CARE EDUCATION/TRAINING PROGRAM

## 2023-03-21 PROCEDURE — 93010 ELECTROCARDIOGRAM REPORT: CPT | Mod: ,,, | Performed by: INTERNAL MEDICINE

## 2023-03-21 PROCEDURE — 85025 COMPLETE CBC W/AUTO DIFF WBC: CPT | Performed by: STUDENT IN AN ORGANIZED HEALTH CARE EDUCATION/TRAINING PROGRAM

## 2023-03-21 PROCEDURE — 94761 N-INVAS EAR/PLS OXIMETRY MLT: CPT

## 2023-03-21 RX ORDER — SODIUM CHLORIDE, SODIUM LACTATE, POTASSIUM CHLORIDE, CALCIUM CHLORIDE 600; 310; 30; 20 MG/100ML; MG/100ML; MG/100ML; MG/100ML
INJECTION, SOLUTION INTRAVENOUS CONTINUOUS
Status: DISCONTINUED | OUTPATIENT
Start: 2023-03-21 | End: 2023-03-21 | Stop reason: HOSPADM

## 2023-03-21 RX ORDER — FOLIC ACID 1 MG/1
1 TABLET ORAL DAILY
Status: DISCONTINUED | OUTPATIENT
Start: 2023-03-21 | End: 2023-03-21 | Stop reason: HOSPADM

## 2023-03-21 RX ORDER — TAMSULOSIN HYDROCHLORIDE 0.4 MG/1
0.4 CAPSULE ORAL DAILY
Status: DISCONTINUED | OUTPATIENT
Start: 2023-03-21 | End: 2023-03-21 | Stop reason: HOSPADM

## 2023-03-21 RX ORDER — ATORVASTATIN CALCIUM 40 MG/1
40 TABLET, FILM COATED ORAL NIGHTLY
Status: DISCONTINUED | OUTPATIENT
Start: 2023-03-21 | End: 2023-03-21 | Stop reason: HOSPADM

## 2023-03-21 RX ORDER — LOSARTAN POTASSIUM 25 MG/1
25 TABLET ORAL NIGHTLY
Status: DISCONTINUED | OUTPATIENT
Start: 2023-03-21 | End: 2023-03-21 | Stop reason: HOSPADM

## 2023-03-21 RX ORDER — MEMANTINE HYDROCHLORIDE 5 MG/1
5 TABLET ORAL 2 TIMES DAILY
Status: DISCONTINUED | OUTPATIENT
Start: 2023-03-21 | End: 2023-03-21 | Stop reason: HOSPADM

## 2023-03-21 RX ORDER — CITALOPRAM 20 MG/1
20 TABLET, FILM COATED ORAL DAILY
Status: DISCONTINUED | OUTPATIENT
Start: 2023-03-21 | End: 2023-03-21 | Stop reason: HOSPADM

## 2023-03-21 RX ORDER — ALLOPURINOL 100 MG/1
300 TABLET ORAL DAILY
Status: DISCONTINUED | OUTPATIENT
Start: 2023-03-21 | End: 2023-03-21 | Stop reason: HOSPADM

## 2023-03-21 RX ORDER — GABAPENTIN 300 MG/1
300 CAPSULE ORAL 3 TIMES DAILY
Status: DISCONTINUED | OUTPATIENT
Start: 2023-03-21 | End: 2023-03-21 | Stop reason: HOSPADM

## 2023-03-21 RX ORDER — LOSARTAN POTASSIUM 50 MG/1
50 TABLET ORAL NIGHTLY
Status: DISCONTINUED | OUTPATIENT
Start: 2023-03-21 | End: 2023-03-21

## 2023-03-21 RX ORDER — NAPROXEN SODIUM 220 MG/1
81 TABLET, FILM COATED ORAL DAILY
Status: DISCONTINUED | OUTPATIENT
Start: 2023-03-21 | End: 2023-03-21 | Stop reason: HOSPADM

## 2023-03-21 RX ORDER — LOSARTAN POTASSIUM 50 MG/1
50 TABLET ORAL NIGHTLY
Start: 2023-03-21 | End: 2023-12-27

## 2023-03-21 RX ORDER — FINASTERIDE 5 MG/1
5 TABLET, FILM COATED ORAL DAILY
Status: DISCONTINUED | OUTPATIENT
Start: 2023-03-21 | End: 2023-03-21 | Stop reason: HOSPADM

## 2023-03-21 RX ADMIN — SODIUM CHLORIDE, SODIUM LACTATE, POTASSIUM CHLORIDE, AND CALCIUM CHLORIDE: .6; .31; .03; .02 INJECTION, SOLUTION INTRAVENOUS at 02:03

## 2023-03-21 RX ADMIN — ASPIRIN 81 MG CHEWABLE TABLET 81 MG: 81 TABLET CHEWABLE at 08:03

## 2023-03-21 RX ADMIN — MEMANTINE HYDROCHLORIDE 5 MG: 5 TABLET ORAL at 12:03

## 2023-03-21 RX ADMIN — ALLOPURINOL 300 MG: 100 TABLET ORAL at 08:03

## 2023-03-21 RX ADMIN — FINASTERIDE 5 MG: 5 TABLET, FILM COATED ORAL at 09:03

## 2023-03-21 RX ADMIN — TAMSULOSIN HYDROCHLORIDE 0.4 MG: 0.4 CAPSULE ORAL at 12:03

## 2023-03-21 RX ADMIN — FOLIC ACID 1 MG: 1 TABLET ORAL at 08:03

## 2023-03-21 RX ADMIN — GABAPENTIN 300 MG: 300 CAPSULE ORAL at 02:03

## 2023-03-21 RX ADMIN — GABAPENTIN 300 MG: 300 CAPSULE ORAL at 08:03

## 2023-03-21 RX ADMIN — CITALOPRAM HYDROBROMIDE 20 MG: 20 TABLET ORAL at 08:03

## 2023-03-21 NOTE — PLAN OF CARE
Introduced as VN and will be reviewing discharge instructions.  Educated patient on reason for admission, home medication list, and discharge instructions including when to return to ED and the following doctor appointments.  Education per flowsheet.  Opportunity given for questions and questions answered. Nurse  notified of   completion of discharge education. MD messaged to ask about Coumadin resume dose

## 2023-03-21 NOTE — DISCHARGE SUMMARY
[facility] Hospital Medicine Discharge Summary    Primary Team: [facility] Hospitalist Team A  Attending Physician: Megan Rutherford MD  Resident: Dr. Lockwood  Intern: Dr. Arreola     Date of Admit: 3/20/2023  Date of Discharge: 3/21/2023    Discharge to: Home/Self-Care  Condition: Stable    Discharge Diagnoses     Patient Active Problem List   Diagnosis    Supine hypertension    Tobacco use disorder, mild, in sustained remission    Dyspnea    Discomfort in chest    Anticoagulated with warfarin    Cardiomyopathy    Abnormal cardiovascular stress test    SVT (supraventricular tachycardia)    Mitral regurgitation    Coronary artery disease involving native coronary artery with angina pectoris    Coronary artery disease involving native coronary artery of native heart without angina pectoris    Pre-operative cardiovascular examination    Abnormal chest CT    HX: long term anticoagulant use    Ischemic cardiomyopathy    Paroxysmal A-fib    Moderate mitral insufficiency    Pulmonary HTN    Atrial fibrillation with RVR    RHINA (acute kidney injury)    Orthostatic hypotension    Normocytic anemia    Supratherapeutic INR    Postural dizziness with presyncope       Consultants and Procedures     Consultants:  None    Procedures:   None    Imaging:  CTH  TTE    Brief History of Present Illness      Bright [place] is a 82 y.o. male with a history of dementia (dx 1 year ago), persistent Afib s/p DCCV on chronic warfarin, severe CAD (refused recommended CABG/stent) with ischemic cardiomyopathy (prior LVEF 25%, 50% in 9/2022), HTN, HLD, and gout who presented to [facility] ED then transferred to [facility] on 3/20/2023 with a primary complaint of presyncopal event with suspected brief loss of consciousness. Pt w orthostatic hypotension and supine hypertension. Suspect autonomic dysfunction in the setting of neurodegenerative disease vs medication-induced     Patient alert and oriented x2 and aware of fall, but confused about medical  "situation on initial evaluation. Known history of dementia (diagnosed ~1 year ago). History obtained with the assistance of patient's grandchildren and wife at bedside.     Patient was on his way to Coumadin clinic to get regular blood drawn. Patient was feeling lightheaded and a little "out of it" in the morning that acutely worsened as he got out of his car. Patient then fell to floor and caught by family member. Unclear if complete loss of consciousness. They checked his blood pressure at that time and found it very low so patient was brought to the emergency room. He denies chest pain, shortness of breath, palpitations, vision changes, headache. Per family, patient was nauseous and given emesis bag at [facility] ED but do not think he had any episode of emesis. Patient's family states they have been trying to get patient to drink more water lately as they believe it may be insufficient. Patient takes all medications as prescribed. Denies vomiting or diarrhea. Patient has never passed out like this in the past, but did have a fall 4 days prior to admission when he slipped on the bathroom floor and hit his arm. Denies hurting head or loss of consciousness. Patient additionally presented to ED in January 2023 for a fall in the bathroom where he hit his chin after losing balance in the bathroom. Of note, patient was recently hospitalized 2/28-3/2 for pneumonia and was discharged with 1 week course of cefdinir and 3 day course of azithromycin.    For the full HPI please refer to the History & Physical from this admission.    Pt had an uncomplicated hospital course. He felt fine upon arrival to [facility] ED. He was given 2L NS for orthostasis, and 2 doses IV hydralazine 10mg for increased BP (200s/100s) consistent with supine HTN. Initially held all home meds possibly contributing to hypotension. Restarted losartan 25mg (decreased from 50mg), memantine 5mg, flomax 0.4mg, finasteride. Will discontinue risperidal and lopressor at " discharge. Warfarin initially held due to supratherapeutic INR. Pt to resume normal home schedule on discharge and FU with coumadin clinic for titration. He was on continuous cardiac monitoring with low concern for bradycardia or arrythmia. TTE showed EF65% with concentric hypertrophy, normal LV diastolic function, mild aortic stenosis and mild MR. Pt discharged in stable condition, to home with continued  services.    Hospital Course By Problem with Pertinent Findings     Orthostatic Hypotension  Supine Hypertension   Presyncope +/- brief LOC with hypotension  - Normotensive at arrival but orthostatics positive at [facility] ED which increased to 200s/100s after IVF administration prior to transfer  - Initial hospitalist evaluation: /101, HR 55 supine; /93, HR 61 sitting up; asymptomatic  - patient s/p 2L NS and 2 doses IV hydralazine 10mg since presentation  - suspect autonomic dysfunction in the setting of neurodegenerative disease  - with recent fall, known supratherapeutic INR, and possible new autonomic dysfunction, ordered CTH to r/o ICH - negative  - placed nursing communication to keep head of bed elevated to seated position and at least 30-45 degrees at rest to prevent supine hypertension; monitored BP while patient in seated position; will titrate antihypertensives as appropriate  - initially holding all home medications that may contribute to orthostatic hypotension (memantine, risperidone, tamulosin, finasteride);   - losartan restarted at half dose of 25mg, risperidal and lopressor discontinued  - fall precautions in place  - EKG sinus rhythm with L axis deviation and LVH  - continuous cardiac monitoring to r/o arrhythmia or symptomatic bradycardia - low concern for this  - TTE ordered to evaluate cardiac function - results as above     Persistent Atrial Fibrillation s/p DCCV (2016) on Chronic Warfarin Therapy  Supratherapeutic INR  Chronic Warfarin use for persistent Afib since 2016, follows in  Coumadin Clinic  - INR 3.8, has been supratherapeutic over the last >1 week per chart review  - holding warfarin at this time  - well-controlled since DCCV in 2016 (1 episode Afib RVR Sept 2022 in the setting of hypotension and hypoxia, self converted with improvement in BP)  - EKG difficult to assess but appears sinus rhythm, ordered repeat  - continuous cardiac monitoring  - Discontinue Lopressor 25mg BID   - Resume home warfarin schedule and FU with coumadin clinic     Acute Kidney Injury, improving  Cr 1.79 (baseline ~1.1), suspect 2/2 severe hypertension vs hypoperfusion from orthostatic hypotension  - received 2L NS in ED  - Cr. 1.3 today     Recurrent Falls  - patient denies presyncope or any preceding symptoms of prior falls, most recent 4 days PTA and Jan 2023  - orthostatic hypotension workup as above  - Works with home PT twice weekly  - PT eval here for continued therapy     Normocytic Anemia  H/H 12.2/37.9 at admission (around baseline)  - no signs of acute bleed  - folate wnl Nov 2022, iron studies pending     Severe Coronary Artery Disease  Ischemic Cardiomyopathy / Heart Failure with Recovered EF  Dx via 2016 [facility] showed significant global hypokinesis (EF 25-30%) with severe triple vessel disease, recommended CABG but pre-op workup delayed by lung lesion that eventually self-resolved prior to procedure; patient then refused CABG and stenting at that time; has been medically managed since and follows with cardiology  - most recent TTE with LVEF 50% and bradycardia, otherwise unremarkable  - continue home ASA 81mg, atorvastatin 40 daily  - will continue home Losartan and Lopressor if BP and HR can tolerate     Hypertension  Hyperlipidemia  - will titrate antihypertensives as above  - continue home Lipitor 40mg daily     Dementia  Dx 1 year ago, possible vascular dementia per psychiatry note  - holding home memantine and risperidone in the setting of orthostatic hypotension  - continue home citalopram  20mg daily     Gout  - continue home allopurinol    Discharge Medications        Medication List        CONTINUE taking these medications      acetaminophen 325 MG tablet  Commonly known as: TYLENOL     allopurinoL 300 MG tablet  Commonly known as: ZYLOPRIM     aspirin 81 MG Chew     atorvastatin 40 MG tablet  Commonly known as: LIPITOR     citalopram 20 MG tablet  Commonly known as: CeleXA     cyanocobalamin-cobamamide 5,000-100 mcg Subl  Commonly known as: B-12 PLUS  Place 1,000 mcg under the tongue once daily.     finasteride 5 mg tablet  Commonly known as: PROSCAR     folic acid 1 MG tablet  Commonly known as: FOLVITE     gabapentin 300 MG capsule  Commonly known as: NEURONTIN     losartan 50 MG tablet  Commonly known as: COZAAR  Take 1 tablet (50 mg total) by mouth every evening.     memantine 5 MG Tab  Commonly known as: NAMENDA  Take 1 tablet (5 mg total) by mouth 2 (two) times daily.     tamsulosin 0.4 mg Cap  Commonly known as: FLOMAX     warfarin 4 MG tablet  Commonly known as: COUMADIN            STOP taking these medications      metoprolol tartrate 25 MG tablet  Commonly known as: LOPRESSOR     nitroGLYCERIN 0.4 MG SL tablet  Commonly known as: NITROSTAT     risperiDONE 0.25 MG Tab  Commonly known as: RISPERDAL               Where to Get Your Medications        Information about where to get these medications is not yet available    Ask your nurse or doctor about these medications  losartan 50 MG tablet          Discharge Information:   Diet:  Cardiac    Physical Activity:  As tolerated             Instructions:  1. Take all medications as prescribed  2. Keep all follow-up appointments  3. Return to the hospital or call your primary care physicians if any worsening symptoms such as fever, chest pain, shortness of breath, return of symptoms, or any other concerns.    Follow-Up Appointments:  Follow up with PCP and cardiology within 2 weeks     Marilyn Arreola MD  [facility] Internal Medicine HO-I  03/21/2023 11:01  AM

## 2023-03-21 NOTE — H&P
"[facility] Internal Medicine History and Physical - Resident Note    Admitting Team: [facility] Internal Medicine Team A  Attending Physician: Dr. Megan Rutherford   Resident: Dr. Lockwood  Interns: Dr. Arreola    Date of Admit: 3/20/2023    Chief Complaint     Presyncope for several minutes +/- LOC for a few seconds    Subjective:      History of Present Illness:  Bright [place] is a 82 y.o. male with a history of dementia (dx 1 year ago), persistent Afib s/p DCCV on chronic warfarin, severe CAD (refused recommended CABG/stent) with ischemic cardiomyopathy (prior LVEF 25%, 50% in 9/2022), HTN, HLD, and gout who presented to [facility] ED then transferred to [facility] on 3/20/2023 with a primary complaint of presyncopal event with suspected brief loss of consciousness.    Patient alert and oriented x2 and aware of fall, but confused about medical situation. Known history of dementia (diagnosed ~1 year ago). History obtained with the assistance of patient's grandchildren and wife at bedside.    Patient was on his way to Coumadin clinic to get regular blood drawn. Patient was feeling lightheaded and a little "out of it" in the morning that acutely worsened as he got out of his car. Patient then fell to floor and caught by family member. Unclear if complete loss of consciousness. They checked his blood pressure at that time and found it very low so patient was brought to the emergency room. He denies chest pain, shortness of breath, palpitations, vision changes, headache. Per family, patient was nauseous and given emesis bag at [facility] ED but do not think he had any episode of emesis. Patient's family states they have been trying to get patient to drink more water lately as they believe it may be insufficient. Patient takes all medications as prescribed. Denies vomiting or diarrhea. Patient has never passed out like this in the past, but did have a fall 4 days prior to admission when he slipped on the bathroom floor and hit his " arm. Denies hutting head or loss of consciousness. Patient additionally presented to ED in January 2023 for a fall in the bathroom where he hit his chin after losing balance in the bathroom. Of note, patient was recently hospitalized 2/28-3/2 for pneumonia and was discharged with 1 week course of cefdinir and 3 day course of azithromycin.    Past Medical History:  Past Medical History:   Diagnosis Date    Atrial fibrillation     Gout     Hyperlipidemia     Hypertension        Past Surgical History:  Past Surgical History:   Procedure Laterality Date    CARDIOVERSION  3/15/16    SOPHIA prior       Allergies:  Review of patient's allergies indicates:  No Known Allergies    Home Medications:  Prior to Admission medications    Medication Sig Start Date End Date Taking? Authorizing Provider   acetaminophen (TYLENOL) 325 MG tablet Take 650 mg by mouth every 6 (six) hours as needed for Pain. Added by MAR (Medication Administration Report)   Yes Historical Provider   allopurinol (ZYLOPRIM) 300 MG tablet Take 300 mg by mouth once daily.   Yes Historical Provider   aspirin 81 MG Chew CHEW 1 TABLET BY MOUTH EVERY MORNING 12/24/22  Yes Historical Provider   atorvastatin (LIPITOR) 40 MG tablet Take 40 mg by mouth every evening.   Yes Historical Provider   citalopram (CELEXA) 20 MG tablet Take 20 mg by mouth once daily. 1/2/23  Yes Historical Provider   cyanocobalamin-cobamamide (B-12 PLUS) 5,000-100 mcg Subl Place 1,000 mcg under the tongue once daily. 12/6/22  Yes Wolfgang Peterson Jr., MD   finasteride (PROSCAR) 5 mg tablet Take 5 mg by mouth once daily.   Yes Historical Provider   folic acid (FOLVITE) 1 MG tablet Take 1 mg by mouth once daily. 3/2/23  Yes Historical Provider   gabapentin (NEURONTIN) 300 MG capsule Take 300 mg by mouth 3 (three) times daily. 1/20/23  Yes Historical Provider   ibuprofen (ADVIL,MOTRIN) 200 MG tablet Take 400 mg by mouth every 6 (six) hours as needed for Pain. Added by MAR (Medication Administration  Report)   Yes Historical Provider   losartan (COZAAR) 50 MG tablet Take 1 tablet (50 mg total) by mouth once daily.  Patient taking differently: Take 50 mg by mouth every evening. 9/23/22 3/20/23 Yes Dion Obregon MD   memantine (NAMENDA) 5 MG Tab Take 1 tablet (5 mg total) by mouth 2 (two) times daily. 23  Yes Wolfgang Peterson Jr., MD   metoprolol tartrate (LOPRESSOR) 25 MG tablet Take 25 mg by mouth 2 (two) times daily. 23  Yes Historical Provider   nitroGLYCERIN (NITROSTAT) 0.4 MG SL tablet Place 1 tablet (0.4 mg total) under the tongue every 5 (five) minutes as needed for Chest pain. 4/13/16 3/20/23 Yes Sahn Santillan MD   risperiDONE (RISPERDAL) 0.25 MG Tab Take 1 tablet (0.25 mg total) by mouth 2 (two) times daily. 11/28/22 3/20/23 Yes Sharif Bradford MD   tamsulosin (FLOMAX) 0.4 mg Cp24 Take 0.4 mg by mouth once daily.   Yes Historical Provider   warfarin (COUMADIN) 4 MG tablet Take by mouth Daily. Take 1 tablet by mouth on Thursday; and 2 tablets on Friday, Saturday, and Monday; and 1 and 1/2 tablet on  per Coumadin Clinic   Yes Historical Provider       Family History:  History reviewed. No pertinent family history.    Social History:  Social History     Tobacco Use    Smoking status: Former     Types: Cigarettes     Start date: 1950     Quit date: 1970     Years since quittin.2    Smokeless tobacco: Never   Substance Use Topics    Alcohol use: Yes     Alcohol/week: 2.0 standard drinks     Types: 2 Cans of beer per week    Drug use: No       Review of Systems:  Pertinent positives and negatives listed in HPI. All other systems are reviewed and are negative.    Health Maintaince :   Primary Care Physician: Olivia Mckeon  Immunizations:   Immunization History   Administered Date(s) Administered    Tdap 2023     Cancer Screening:  Colonoscopy: unknown if up to date.      Objective:   Last 24 Hour Vital Signs:  BP  Min: 94/51  Max: 219/106  Temp  Av.1 °F (36.2 °C)   "Min: 96.2 °F (35.7 °C)  Max: 97.6 °F (36.4 °C)  Pulse  Av.8  Min: 50  Max: 67  Resp  Av.4  Min: 8  Max: 20  SpO2  Av.1 %  Min: 97 %  Max: 100 %  Height  Av' 1" (185.4 cm)  Min: 6' 1" (185.4 cm)  Max: 6' 1" (185.4 cm)  Weight  Av.3 kg (176 lb 15.2 oz)  Min: 78.9 kg (173 lb 14.4 oz)  Max: 81.6 kg (180 lb)  Body mass index is 22.94 kg/m².  I/O last 3 completed shifts:  In: 1000 [I.V.:1000]  Out: -     Orthostatic Vitals:  Supine 229/101, HR 55  Sitting 167/93, HR 61  Asymptomatic    Physical Examination:  General: Alert and awake in no apparent distress  Head:  Normocephalic and atraumatic  Eyes:  PERRL; EOMI with anicteric sclera and clear conjunctivae  Mouth:  Oropharynx clear and without exudate; moist mucous membranes  Cardio:  Regular rate and rhythm with normal S1 and S2; no murmurs or rubs  Resp:  CTAB; respirations unlabored; no wheezes, crackles or rhonchi  Abdom: Soft, NTND with normoactive bowel sounds  Extrem: Warm and well-perfused with no clubbing, cyanosis or edema  Skin:  Well healing lesion on L forearm; No rashes or color changes  Pulses: 2+ and symmetric distally  Neuro:  Alert and oriented to self and time, did not known location but was recently transferred from [facility]; intermittent confusion about situation; cooperative and pleasant with no focal deficits    Laboratory:  Most Recent Data:  CBC:   Lab Results   Component Value Date    WBC 5.01 2023    HGB 12.2 (L) 2023    HCT 37.9 (L) 2023     2023    MCV 97 2023    RDW 13.2 2023     BMP:   Lab Results   Component Value Date     2023    K 4.0 2023     2023    CO2 28 2023    BUN 16 2023    CREATININE 1.79 (H) 2023    GLU 95 2023    CALCIUM 8.8 2023                 LFTs:   Lab Results   Component Value Date    PROT 7.2 2023    ALBUMIN 4.0 2023    BILITOT 0.8 2023    AST 27 2023    ALKPHOS 128 (H) " 03/20/2023    ALT 18 03/20/2023     Coags:   Lab Results   Component Value Date    INR 3.8 (H) 03/20/2023     Other Results:  EKG (my interpretation): difficult to interpret due to quality, ordered repeat    Radiology:  Imaging Results    None         Assessment:     Bright [place] is a 82 y.o. male with a history of dementia, persistent Afib s/p DCCV on chronic warfarin, severe CAD (refused recommended CABG/stent) with ischemic cardiomyopathy (prior LVEF 25%, 50% in 9/2022), HTN, HLD, and gout who presenting with orthostatic hypotension and supine hypertension. Suspect autonomic dysfunction in the setting of neurodegenerative disease vs medication-induced    Plan:     Orthostatic Hypotension  Supine Hypertension  Presyncope +/- brief LOC associated with hypotension  - Normotensive at arrival but orthostatics positive at [facility] ED which increased to 200s/100s after IVF administration prior to transfer  - Initial hospitalist evaluation: /101, HR 55 supine; /93, HR 61 sitting up; asymptomatic  - patient now s/p 2L NS and 2 doses IV hydralazine 10mg since presentation  - suspect autonomic dysfunction in the setting of neurodegenerative disease  - with recent fall, known supratherapeutic INR, and possible new autonomic dysfunction, ordered CTH to r/o ICH  - placed nursing communication to keep head of bed elevated to seated position and at least 30-45 degrees at rest to prevent supine hypertension; continue to monitor BP while patient in seated position; will titrate antihypertensives as appropriate  - holding all home medications that may contribute to orthostatic hypotension (memantine, risperidone, tamulosin, finasteride); will slowly re-introduce or adjust as appropriate pending acute stabilization in BP  - fall precautions in place  - EKG difficult to assess but appears sinus rhythm, ordered repeat  - continuous cardiac monitoring to r/o arrhythmia or symptomatic bradycardia  - TTE ordered to evaluate  cardiac function    Persistent Atrial Fibrillation s/p DCCV (2016) on Chronic Warfarin Therapy  Supratherapeutic INR  Chronic Warfarin use for persistent Afib since 2016, follows in Coumadin Clinic  - INR 3.8, has been supratherapeutic over the last >1 week per chart review  - holing warfarin at this time, will continue to monitor INR and reintroduce as appropriate  - well-controlled since DCCV in 2016 (1 episode Afib RVR Sept 2022 in the setting of hypotension and hypoxia, self converted with improvement in BP)  - EKG difficult to assess but appears sinus rhythm, ordered repeat  - continuous cardiac monitoring  - will continue home Lopressor 25mg BID if BP and HR can tolerate    Acute Kidney Injury  Cr 1.79 (baseline ~1.1), suspect 2/2 severe hypertension vs hypoperfusion from orthostatic hypotension  - received 2L NS in ED, monitoring BP as above  - daily CMP to monitor kidney function    Recurrent Falls  - patient denies presyncope or any preceding symptoms of prior falls, most recent 4 days PTA and Jan 2023  - orthostatic hypotension workup as above  - will consult PT/OT when patient BP stabilizes    Normocytic Anemia  H/H 12.2/37.9 at admission (around baseline)  - no signs of acute bleed  - folate wnl Nov 2022, will order iron studies    Severe Coronary Artery Disease  Ischemic Cardiomyopathy / Heart Failure with Recovered EF  Dx via 2016 [facility] showed significant global hypokinesis (EF 25-30%) with severe triple vessel disease, recommended CABG but pre-op workup delayed by lung lesion that eventually self-resolved prior to procedure; patient then refused CABG and stenting at that time; has been medically managed since and follows with cardiology  - most recent TTE with LVEF 50% and bradycardia, otherwise unremarkable  - continue home ASA 81mg daily  - will continue home Losartan and Lopressor if BP and HR can tolerate    Hypertension  Hyperlipidemia  - will titrate antihypertensives as above  - continue home  Lipitor 40mg daily    Dementia  Dx 1 year ago, possible vascular dementia per psychiatry note  - holding home memantine and risperidone in the setting of orthostatic hypotension  - continue home citalopram 20mg daily    Gout  - continue home allopurinol      Code Status:     Full Code    Elizabeth Rutledge MD  [employer] Internal Medicine HO-2  [facility] Internal Medicine Service

## 2023-03-21 NOTE — PROGRESS NOTES
"[facility] Medicine Progress Note    Primary Team: \[facility] [place] [facility]\"" Hospitalist Team A  Attending Physician: Megan Rutherford MD  Resident: Mandie  Intern: Maxwell    Subjective:      Sitting up in bed eating breakfast this morning.  Wife at bedside.  Pt has no complaints.  Denies any pain, current lightheadedness/dizziness, CP, SOB.  Does remember feeling lightheaded and weak yesterday while walking from the car to the clinic.  Denies any change in PO intake.  Denies recurrent symptoms of lightheadedness.  Did have 1 fall recently per wife, but mechanical in nature after slipping on urine on the floor in the bathroom.       Objective:     Last 24 Hour Vital Signs:  BP  Min: 94/51  Max: 219/106  Temp  Av.2 °F (36.2 °C)  Min: 96.2 °F (35.7 °C)  Max: 97.6 °F (36.4 °C)  Pulse  Av.5  Min: 50  Max: 67  Resp  Av.4  Min: 8  Max: 20  SpO2  Av.2 %  Min: 97 %  Max: 100 %  Height  Av' 1" (185.4 cm)  Min: 6' 1" (185.4 cm)  Max: 6' 1" (185.4 cm)  Weight  Av.3 kg (176 lb 15.2 oz)  Min: 78.9 kg (173 lb 14.4 oz)  Max: 81.6 kg (180 lb)  I/O last 3 completed shifts:  In: 1000 [I.V.:1000]  Out: 400 [Urine:400]    Physical Examination:  Physical Exam  Constitutional:       General: He is not in acute distress.     Appearance: Normal appearance.   HENT:      Head: Normocephalic and atraumatic.      Nose: Nose normal.      Mouth/Throat:      Mouth: Mucous membranes are moist.      Pharynx: Oropharynx is clear.   Eyes:      Extraocular Movements: Extraocular movements intact.      Conjunctiva/sclera: Conjunctivae normal.      Pupils: Pupils are equal, round, and reactive to light.   Cardiovascular:      Rate and Rhythm: Normal rate and regular rhythm.      Heart sounds: Murmur heard.   Systolic murmur is present with a grade of 3/6.      Comments: 3/6 crescendo/decrescendo murmur at 2nd intercostal space right sternal border with radiation to carotids; 3/6 holosystolic murmur best heard at apex with radiation to " axilla  Pulmonary:      Effort: Pulmonary effort is normal.      Breath sounds: Normal breath sounds.   Abdominal:      General: Bowel sounds are normal.      Palpations: Abdomen is soft.   Musculoskeletal:         General: Normal range of motion.      Cervical back: Normal range of motion.   Skin:     General: Skin is warm and dry.      Capillary Refill: Capillary refill takes less than 2 seconds.   Neurological:      General: No focal deficit present.      Mental Status: He is alert. Mental status is at baseline.   Psychiatric:         Mood and Affect: Mood normal.         Behavior: Behavior normal.        Laboratory:  Laboratory Data   Recent Labs   Lab 03/20/23  1434 03/21/23  0451   WBC 5.01 5.47   HGB 12.2* 11.2*   HCT 37.9* 34.5*    129*   MCV 97 95    140   K 4.0 3.8    108   CO2 28 23   BUN 16 15   CREATININE 1.79* 1.3   GLU 95 99   CALCIUM 8.8 8.3*   PROT 7.2 6.1   ALBUMIN 4.0 3.1*   PHOS  --  2.6*   MG  --  1.7   AST 27 14   ALT 18 10   ALKPHOS 128* 117     Invalid input(s): POCTGLU  No results for input(s): HGBA1C in the last 168 hours.    Microbiology Data   none    Other Results:  EKG (my interpretation):  Sinus rhythm with L axis deviation and LVH.    Radiology Data  CT Head Without Contrast   Final Result      No convincing acute intracranial hemorrhage, finding of major arterial infarct, mass or mass effect.      Prominent deep white matter low density as seen with microvascular chronic ischemic changes.      Senescent atrophic changes.      Remote cerebellar infarcts.         Electronically signed by: Faith Beauchamp   Date:    03/21/2023   Time:    01:07          Current Medications:     Infusions:       Scheduled:   allopurinoL  300 mg Oral Daily    aspirin  81 mg Oral Daily    atorvastatin  40 mg Oral QHS    citalopram  20 mg Oral Daily    folic acid  1 mg Oral Daily    gabapentin  300 mg Oral TID    losartan  25 mg Oral QHS        PRN:  sodium chloride 0.9%    Antibiotics and  Day Number of Therapy:  none    Lines and Day Number of Therapy:  PIV    Assessment:     Bright Spain is a 82 y.o. male with a history of dementia, persistent Afib s/p DCCV on chronic warfarin, severe CAD (refused recommended CABG/stent) with ischemic cardiomyopathy (prior LVEF 25%, 50% in 9/2022), HTN, HLD, and gout who presenting with orthostatic hypotension and supine hypertension. Suspect autonomic dysfunction in the setting of neurodegenerative disease vs medication-induced     Plan:     Orthostatic Hypotension  Supine Hypertension  Presyncope +/- brief LOC associated with hypotension  - Normotensive at arrival but orthostatics positive at [facility] ED which increased to 200s/100s after IVF administration prior to transfer  - Initial hospitalist evaluation: /101, HR 55 supine; /93, HR 61 sitting up; asymptomatic  - patient now s/p 2L NS and 2 doses IV hydralazine 10mg since presentation  - suspect autonomic dysfunction in the setting of neurodegenerative disease  - with recent fall, known supratherapeutic INR, and possible new autonomic dysfunction, ordered CTH to r/o ICH - negative  - placed nursing communication to keep head of bed elevated to seated position and at least 30-45 degrees at rest to prevent supine hypertension; continue to monitor BP while patient in seated position; will titrate antihypertensives as appropriate  - initially holding all home medications that may contribute to orthostatic hypotension (memantine, risperidone, tamulosin, finasteride); will slowly re-introduce or adjust as appropriate pending acute stabilization in BP  - fall precautions in place  - EKG sinus rhythm with L axis deviation and LVH  - continuous cardiac monitoring to r/o arrhythmia or symptomatic bradycardia  - TTE ordered to evaluate cardiac function     Persistent Atrial Fibrillation s/p DCCV (2016) on Chronic Warfarin Therapy  Supratherapeutic INR  Chronic Warfarin use for persistent Afib since 2016, follows  in [facility]  - INR 3.8, has been supratherapeutic over the last >1 week per chart review  - holing warfarin at this time, will continue to monitor INR and reintroduce as appropriate  - well-controlled since DCCV in 2016 (1 episode Afib RVR Sept 2022 in the setting of hypotension and hypoxia, self converted with improvement in BP)  - EKG difficult to assess but appears sinus rhythm, ordered repeat  - continuous cardiac monitoring  - will continue home Lopressor 25mg BID if BP and HR can tolerate     Acute Kidney Injury, improving  Cr 1.79 (baseline ~1.1), suspect 2/2 severe hypertension vs hypoperfusion from orthostatic hypotension  - received 2L NS in ED  - Cr. 1.3 today     Recurrent Falls  - patient denies presyncope or any preceding symptoms of prior falls, most recent 4 days PTA and Jan 2023  - orthostatic hypotension workup as above  - Works with home PT twice weekly  - PT eval here for continued therapy     Normocytic Anemia  H/H 12.2/37.9 at admission (around baseline)  - no signs of acute bleed  - folate wnl Nov 2022, iron studies pending     Severe Coronary Artery Disease  Ischemic Cardiomyopathy / Heart Failure with Recovered EF  Dx via 2016 [facility] showed significant global hypokinesis (EF 25-30%) with severe triple vessel disease, recommended CABG but pre-op workup delayed by lung lesion that eventually self-resolved prior to procedure; patient then refused CABG and stenting at that time; has been medically managed since and follows with cardiology  - most recent TTE with LVEF 50% and bradycardia, otherwise unremarkable  - continue home ASA 81mg, atorvastatin 40 daily  - will continue home Losartan and Lopressor if BP and HR can tolerate     Hypertension  Hyperlipidemia  - will titrate antihypertensives as above  - continue home Lipitor 40mg daily     Dementia  Dx 1 year ago, possible vascular dementia per psychiatry note  - holding home memantine and risperidone in the setting of orthostatic  hypotension  - continue home citalopram 20mg daily     Gout  - continue home allopurinol      Code: Full  Diet: Cardiac  DVT ppx: warfarin (holding in setting of elevated INR)  Dispo: likely discharge home today      Juliana Lockwood MD  [facility], \[facility]\""II      [facility] Medicine Hospitalist Pager numbers:   [facility] Hospitalist Medicine Team A (Hardik/Sangeeta): [phone]  [facility] Hospitalist Medicine Team B (Coby/Jose):  [phone]

## 2023-03-21 NOTE — NURSING
Patient safety maintained. Medications administered per order. Notified team of result of orthostatic blood pressure, cleared patient for discharge. Family at bedside for transportation. Printed documentation provided for discharge, VN to complete patient education. IV and telemetry removed, patient tolerated well.

## 2023-03-21 NOTE — PLAN OF CARE
"Patient seen for physical therapy evaluation on this date.  Spouse present during session.  Patient oriented to self, location, and situation, however states the year is "".  Patient follows simple commands.  Evaluation limited by orthostatic hypotension upon standing, nursing notified.  Full report to follow.  Anticipate patient will be able to return home with Home Health PT/OT for continued rehabilitation.  No DME needs.      Problem: Physical Therapy  Goal: Physical Therapy Goal  Description: Goals to be met by: 2023     Patient will increase functional independence with mobility by performin. Supine to sit with Modified [place]  2. Sit to supine with Modified [place]  3. Rolling to Left and Right with Modified [place].  4. Sit to stand transfer with Modified [place]  5. Bed to chair transfer with Modified [place] using Rolling Walker  6. Gait  x 150 feet with Modified [place] using Rolling Walker.   7. Ascend/descend 2 stair with  Supervision    Outcome: Ongoing, Progressing     "

## 2023-03-21 NOTE — PT/OT/SLP EVAL
"Physical Therapy Evaluation    Patient Name:  Bright Spain   MRN:  [id number]    Recommendations:     Discharge Recommendations: home health PT, home health OT   Discharge Equipment Recommendations: none   Barriers to discharge:  symptomatic orthostatic hypotension limiting functional mobility, fall risk    Assessment:     Bright Spain is a 82 y.o. male admitted with a medical diagnosis of Orthostatic hypotension.  He presents with the following impairments/functional limitations: weakness, gait instability, impaired endurance, impaired balance, decreased lower extremity function, decreased safety awareness, impaired self care skills, impaired cognition, impaired functional mobility     Patient seen for physical therapy evaluation on this date.  Spouse present during session.  Patient oriented to self, location, and situation, however states the year is "1984".  Patient follows simple commands.  Evaluation limited by symptomatic orthostatic hypotension upon standing, nursing notified.  Full report to follow.  Anticipate patient will be able to return home with Home Health PT/OT for continued rehabilitation.  No DME needs.    Rehab Prognosis: Fair; patient would benefit from acute skilled PT services to address these deficits and reach maximum level of function.    Recent Surgery: * No surgery found *      Plan:     During this hospitalization, patient to be seen 3 x/week to address the identified rehab impairments via gait training, therapeutic activities, therapeutic exercises, neuromuscular re-education and progress toward the following goals:    Plan of Care Expires:  04/20/23    Subjective     Chief Complaint: complains of dizziness upon standing   Patient/Family Comments/goals: To return to PLOF  Pain/Comfort:  Pain Rating 1: 0/10  Pain Rating Post-Intervention 1: 0/10    Patients cultural, spiritual, Bahai conflicts given the current situation: no    Living Environment:  Patient lives with spouse in 1 , 2 " "FRANCIS[place], [place] with BIS in bathroom.    Prior to admission, patients level of function was modified independent with gait with RW, independent with ADLs except has Home Health aide 2x/week for assist with bathing.  Equipment used at home: walker, rolling, wheelchair, bedside commode, cane, straight.  DME owned (not currently used): none.  Upon discharge, patient will have assistance from spouse.    Objective:     Communicated with nurse Mackenzie prior to session.  Patient found supine with peripheral IV, bed alarm  upon PT entry to room.    General Precautions: Standard, fall  Orthopedic Precautions:N/A   Braces: N/A  Respiratory Status: Room air    Exams:  Cognitive Exam:  Patient is oriented to Person, Place, Situation.  However, states year is "". Follows simple commands  Gross Motor Coordination:  WFL  Postural Exam:  Patient presented with the following abnormalities:    -       Rounded shoulders  -       Forward head  Sensation:    -       Intact  light/touch B LEs  Skin Integrity/Edema:      -       Skin integrity: Visible skin intact  -       Edema: Mild B LEs  RLE ROM: WFL  RLE Strength: WFL  LLE ROM: WFL  LLE Strength: WFL    Functional Mobility:  Bed Mobility:     Rolling Right: stand by assistance  Supine to Sit: stand by assistance  Sit to Supine: stand by assistance  Transfers:     Sit to Stand:  contact guard assistance with rolling walker, complains of dizziness,  See below for BPs  Balance: Seated:  SBA EOB   Standing:  requires RW, no LOB, however unsteady and reports lightheadedness      AM-PAC 6 CLICK MOBILITY  Total Score:17       Treatment & Education:  Patient agreeable to therapy, follows simple commands.    Vitals:  Supine:  139/84  Sittin/92  Standin/66 with complaints of dizziness  Supine:  166/90    Deferred gait on this date due to symptomatic orthostatic hypotension upon standing with RW.  Patient returned back to supine with SBA.  Patient and spouse educated to have patient " perform leg exercises while in bed.  Nsg notified of above BPs.    Patient left supine with all lines intact, call button in reach, bed alarm on, and nurse notified.    GOALS:   Multidisciplinary Problems       Physical Therapy Goals          Problem: Physical Therapy    Goal Priority Disciplines Outcome Goal Variances Interventions   Physical Therapy Goal     PT, PT/OT Ongoing, Progressing     Description: Goals to be met by: 2023     Patient will increase functional independence with mobility by performin. Supine to sit with Modified [place]  2. Sit to supine with Modified [place]  3. Rolling to Left and Right with Modified [place].  4. Sit to stand transfer with Modified [place]  5. Bed to chair transfer with Modified [place] using Rolling Walker  6. Gait  x 150 feet with Modified [place] using Rolling Walker.   7. Ascend/descend 2 stair with  Supervision                         History:     Past Medical History:   Diagnosis Date    Atrial fibrillation     Gout     Hyperlipidemia     Hypertension        Past Surgical History:   Procedure Laterality Date    CARDIOVERSION  3/15/16    SOPHIA prior       Time Tracking:     PT Received On: 23  PT Start Time: 1116     PT Stop Time: 1135  PT Total Time (min): 19 min     Billable Minutes: Evaluation 10 and Therapeutic Activity 9      2023

## 2023-03-21 NOTE — PHARMACY MED REC
"  Admission Medication History     The home medication history was taken by Ene Gonzalez CPhT.    Medication history obtained from, Patient's Granddaughter Verified    You may go to "Admission" then "Reconcile Home Medications" tabs to review and/or act upon these items.     The home medication list has been updated by the Pharmacy department.   Please read ALL comments highlighted in yellow.   Please address this information as you see fit.    Feel free to contact us if you have any questions or require assistance.      The medications listed below were removed from the home medication list.  Please reorder if appropriate:  Patient reports no longer taking the following medication(s):  Azithromycin 500 mg  Cefdinir 300 mg  Clonidine 0.1 mg  Donepezil 5 mg  Hydroxyzine 50 mg  Loperamide 2 mg  Maalox 200-200-20 mg/5ml susp   mg/5ml susp  Zofran 4 mg  Potassium Chloride 10 meq        Ene Gonzalez CPhT.  Ext [phone]             .        "

## 2023-03-21 NOTE — PLAN OF CARE
Pt is cleared CM pov to d/c home. SW called [facility] [phone], pt will not need new orders as pt was currently under obs status. Pt will have his wife help transport him home at time of d.c. SW requested f/u appts. Rounds completed on pt. All questions addressed. Bedside nurse to discuss d/c medications. Discussed importance to attend all f/u appts and take medications as prescribed. Verbalized understanding. Case Management to sign off.     Ruperto Peter MSBILLIE  [phone]    Future Appointments   Date Time Provider Department Center   4/26/2023  1:30 PM Wolfgang Peterson Jr., MD [facility]        03/21/23 1521   Final Note   Assessment Type Final Discharge Note   Anticipated Discharge Disposition Home-Health   What phone number can be called within the next 1-3 days to see how you are doing after discharge? [phone]   Hospital Resources/Appts/Education Provided Appointments scheduled and added to AVS   Post-Acute Status   Post-Acute Authorization Home Health   Home Health Status Set-up Complete/Auth obtained   Discharge Delays None known at this time

## 2023-03-21 NOTE — NURSING
2256: Manual /104.     2258: Spoke to Dr Rutledge. Notified MD of manual BP. Awaiting orders. Dr Rutledge stated patient had (+) orthostatic BPs at bedside assessment. Patient resting in bed. NADN. Will continue to monitor.

## 2023-03-21 NOTE — PLAN OF CARE
SARWAT met with pt and pt's wife Taniya [phone] at bedside this AM to complete DCA. Pt reported that he lives at home with his wife and will have her help transport him home at time of d.c. Pt stated that he has a RW, WC, and bsc at home. [facility]. SW will request f/u appts. White board updated with CM name and contact information.  Discharge brochure provided.  Pt encouraged to call with any questions or concerns.  Cm will continue to follow pt through transitions of care and assist with any discharge needs.    Ruperto Peter, MSW  [phone]    Future Appointments   Date Time Provider Department Center   4/26/2023  1:30 PM Wolfgang Peterson Jr., MD [facility]        03/21/23 1101   Discharge Planning   Assessment Type Discharge Planning Assessment   Resource/Environmental Concerns none   Support Systems Spouse/significant other;Children   Equipment Currently Used at Home walker, rolling;wheelchair;bedside commode   Current Living Arrangements home   Care Facility Name home   Patient/Family Anticipates Transition to home;home with family   Patient/Family Anticipated Services at Transition none   DME Needed Upon Discharge  none   Discharge Plan A Home Health

## 2023-03-22 ENCOUNTER — ANTI-COAG VISIT (OUTPATIENT)
Dept: CARDIOLOGY | Facility: CLINIC | Age: 83
End: 2023-03-22
Payer: MEDICARE

## 2023-03-22 DIAGNOSIS — Z79.01 ANTICOAGULATED WITH WARFARIN: Primary | ICD-10-CM

## 2023-03-22 PROCEDURE — 93793 PR ANTICOAGULANT MGMT FOR PT TAKING WARFARIN: ICD-10-PCS | Mod: S$GLB,,,

## 2023-03-22 PROCEDURE — 93793 ANTICOAG MGMT PT WARFARIN: CPT | Mod: S$GLB,,,

## 2023-03-22 NOTE — PROGRESS NOTES
Pt went to the ED for yesterday for orthostatic hypotension, RHINA and recurrent falls (most recent 1/2023). As pt's INR has been trending above range recommend to decrease maintenance dose per calendar.

## 2023-03-22 NOTE — PROGRESS NOTES
Pts daughter stated pt got sick at appt yesterday and was sent to the ER and monitored. Pt states ER MD told her to hold pts warfarin lastnight and would like to know where to go from here for pt.

## 2023-03-27 NOTE — PROGRESS NOTES
3/27/23 [facility] called to reschedule today's lab appointment to 3/30/23 due to having an appointment with his PCP at [facility] on 3/30

## 2023-03-31 ENCOUNTER — ANTI-COAG VISIT (OUTPATIENT)
Dept: CARDIOLOGY | Facility: CLINIC | Age: 83
End: 2023-03-31
Payer: MEDICARE

## 2023-03-31 DIAGNOSIS — Z79.01 ANTICOAGULATED WITH WARFARIN: Primary | ICD-10-CM

## 2023-03-31 LAB — INR PPP: 2.4

## 2023-03-31 PROCEDURE — 93793 ANTICOAG MGMT PT WARFARIN: CPT | Mod: S$GLB,,,

## 2023-03-31 PROCEDURE — 93793 PR ANTICOAGULANT MGMT FOR PT TAKING WARFARIN: ICD-10-PCS | Mod: S$GLB,,,

## 2023-03-31 NOTE — PROGRESS NOTES
INR at goal. Medications and chart reviewed. No changes noted to necessitate adjustment of warfarin or follow-up plan. See calendar.  Will advise pt to hydrate well prior to next lab draw.

## 2023-04-19 ENCOUNTER — ANTI-COAG VISIT (OUTPATIENT)
Dept: CARDIOLOGY | Facility: CLINIC | Age: 83
End: 2023-04-19
Payer: MEDICARE

## 2023-04-19 DIAGNOSIS — Z79.01 ANTICOAGULATED WITH WARFARIN: Primary | ICD-10-CM

## 2023-04-19 LAB — INR PPP: 4

## 2023-04-19 PROCEDURE — 93793 ANTICOAG MGMT PT WARFARIN: CPT | Mod: S$GLB,,,

## 2023-04-19 PROCEDURE — 93793 PR ANTICOAGULANT MGMT FOR PT TAKING WARFARIN: ICD-10-PCS | Mod: S$GLB,,,

## 2023-04-26 ENCOUNTER — OFFICE VISIT (OUTPATIENT)
Dept: PSYCHIATRY | Facility: CLINIC | Age: 83
End: 2023-04-26
Payer: MEDICARE

## 2023-04-26 VITALS — HEART RATE: 81 BPM | SYSTOLIC BLOOD PRESSURE: 112 MMHG | DIASTOLIC BLOOD PRESSURE: 68 MMHG

## 2023-04-26 DIAGNOSIS — F03.90 MAJOR NEUROCOGNITIVE DISORDER: Primary | ICD-10-CM

## 2023-04-26 PROCEDURE — 3074F PR MOST RECENT SYSTOLIC BLOOD PRESSURE < 130 MM HG: ICD-10-PCS | Mod: CPTII,S$GLB,, | Performed by: PSYCHIATRY & NEUROLOGY

## 2023-04-26 PROCEDURE — 99213 PR OFFICE/OUTPT VISIT, EST, LEVL III, 20-29 MIN: ICD-10-PCS | Mod: S$GLB,,, | Performed by: PSYCHIATRY & NEUROLOGY

## 2023-04-26 PROCEDURE — 90833 PSYTX W PT W E/M 30 MIN: CPT | Mod: S$GLB,,, | Performed by: PSYCHIATRY & NEUROLOGY

## 2023-04-26 PROCEDURE — 3078F DIAST BP <80 MM HG: CPT | Mod: CPTII,S$GLB,, | Performed by: PSYCHIATRY & NEUROLOGY

## 2023-04-26 PROCEDURE — 99213 OFFICE O/P EST LOW 20 MIN: CPT | Mod: S$GLB,,, | Performed by: PSYCHIATRY & NEUROLOGY

## 2023-04-26 PROCEDURE — 99999 PR PBB SHADOW E&M-EST. PATIENT-LVL III: ICD-10-PCS | Mod: PBBFAC,,, | Performed by: PSYCHIATRY & NEUROLOGY

## 2023-04-26 PROCEDURE — 3074F SYST BP LT 130 MM HG: CPT | Mod: CPTII,S$GLB,, | Performed by: PSYCHIATRY & NEUROLOGY

## 2023-04-26 PROCEDURE — 90833 PR PSYCHOTHERAPY W/PATIENT W/E&M, 30 MIN (ADD ON): ICD-10-PCS | Mod: S$GLB,,, | Performed by: PSYCHIATRY & NEUROLOGY

## 2023-04-26 PROCEDURE — 3078F PR MOST RECENT DIASTOLIC BLOOD PRESSURE < 80 MM HG: ICD-10-PCS | Mod: CPTII,S$GLB,, | Performed by: PSYCHIATRY & NEUROLOGY

## 2023-04-26 PROCEDURE — 99999 PR PBB SHADOW E&M-EST. PATIENT-LVL III: CPT | Mod: PBBFAC,,, | Performed by: PSYCHIATRY & NEUROLOGY

## 2023-04-26 RX ORDER — MEMANTINE HYDROCHLORIDE 5 MG/1
5 TABLET ORAL 2 TIMES DAILY
Qty: 180 TABLET | Refills: 2 | Status: SHIPPED | OUTPATIENT
Start: 2023-04-26 | End: 2023-10-17

## 2023-04-26 RX ORDER — ACETAMINOPHEN AND PHENYLEPHRINE HCL 325; 5 MG/1; MG/1
1000 TABLET ORAL DAILY
Qty: 90 TABLET | Refills: 2 | Status: SHIPPED | OUTPATIENT
Start: 2023-04-26

## 2023-04-26 RX ORDER — CITALOPRAM 20 MG/1
20 TABLET, FILM COATED ORAL DAILY
Qty: 90 TABLET | Refills: 2 | Status: SHIPPED | OUTPATIENT
Start: 2023-04-26 | End: 2023-11-01 | Stop reason: SDUPTHER

## 2023-04-27 ENCOUNTER — ANTI-COAG VISIT (OUTPATIENT)
Dept: CARDIOLOGY | Facility: CLINIC | Age: 83
End: 2023-04-27
Payer: MEDICARE

## 2023-04-27 DIAGNOSIS — Z79.01 ANTICOAGULATED WITH WARFARIN: Primary | ICD-10-CM

## 2023-04-27 LAB — INR PPP: 2.9

## 2023-04-27 PROCEDURE — 93793 PR ANTICOAGULANT MGMT FOR PT TAKING WARFARIN: ICD-10-PCS | Mod: S$GLB,,,

## 2023-04-27 PROCEDURE — 93793 ANTICOAG MGMT PT WARFARIN: CPT | Mod: S$GLB,,,

## 2023-04-30 NOTE — PROGRESS NOTES
"Staff Note:     Pt interviewed and case discussed.  I agree with Resident's findings and treatment plan.  Pt has slowly progressing dementia, probably vascular based on Atrial Fibrillation.  Recent hospitalization resulted in good control of mood and behavioral issues.  Namenda was recommended to target memory problems.  Possible adverse effects of antipsychotics were discussed with Pt and family including EPS, TOLIVIA., NMS, the FDA "black box" warning of increased risk of cardiovascular death in elderly patients with dementia who are on these medicines.    TLK    "

## 2023-04-30 NOTE — PROGRESS NOTES
Outpatient Psychiatry Follow-Up Visit (MD/NP)    4/26/2023    Clinical Status of Patient:  Outpatient (Ambulatory)    Chief Complaint:  Bright Spain is a 82 y.o. male who presents today for follow-up of memory problem and behavior problems.  Met with patient, son, and spouse.      Interval History and Content of Current Session:  Interim Events/Subjective Report/Content of Current Session:  Mr. Spain was initially evaluated on 11/28/22 in the [facility].  He returned for a scheduled visit accompanied by his wife and son.  A granddaughter participated by phone.  Pt was casually dressed and groomed, seated in a wheelchair.  Spontaneous speech was decreased but he spoke in an animated manner when asked about favored topics.  His family reported that memory loss may not have advanced since his previous visit, and mood and behavioral problems treated during his hospitalization have not returned.  Pt has some insight into his illness and does not attempt to drive.  He and family are satisfied with his present improvement.    Medications were reviewed.  Sublingual B12 was added by phone to target a low B12 level of 231.  No adverse effects were identified.  Risks of antipsychotic therapy were carefully covered at his last visit.  His most current B12 level was 509.  Both he and family wish to continue current treatment.  F/u in 6 months was recommended.  Family will call prn problems.    Psychotherapy:  Target symptoms: depression, anxiety , confusion, memory loss  Why chosen therapy is appropriate versus another modality: relevant to diagnosis, patient responds to this modality, evidence based practice  Outcome monitoring methods: self-report, observation, feedback from family  Therapeutic intervention type: behavior modifying psychotherapy, supportive psychotherapy, psychoeducation with family  Topics discussed/themes:  caregiver issues, building skills sets for symptom management, symptom recognition, life stage  transitional issues  The patient's response to the intervention is accepting. The patient's progress toward treatment goals is good.   Duration of intervention: 27 minutes.    Review of Systems   PSYCHIATRIC: Pertinant items are noted in the narrative.  CARDIOVASCULAR: Atrial fibrillation, Cardiomyopathy    Past Medical, Family and Social History: The patient's past medical, family and social history have been reviewed and updated as appropriate within the electronic medical record - see encounter notes.    Compliance: yes    Side effects: None    Risk Parameters:  Patient reports no suicidal ideation  Patient reports no homicidal ideation  Patient reports no self-injurious behavior  Patient reports no violent behavior    Exam (detailed: at least 9 elements; comprehensive: all 15 elements)   Constitutional  Vitals:  Most recent vital signs, dated less than 90 days prior to this appointment, were reviewed.   Vitals:    04/26/23 1327   BP: 112/68   Pulse: 81        General:  age appropriate, well nourished, casually dressed, seated in wheelchair     Musculoskeletal  Muscle Strength/Tone:  no rigidity, no dyskinesia, no dystonia, no tremor   Gait & Station:  in wheelchair     Psychiatric  Speech:  non-spontaneous   Mood & Affect:  euthymic  shallow   Thought Process:  concrete   Associations:  intact   Thought Content:  normal, no suicidality, no homicidality, delusions, or paranoia   Insight:  limited awareness of illness   Judgement: impaired due to memory loss   Orientation:  person, place, situation   Memory: Gaps evident in all spheres   Language: grossly intact   Attention Span & Concentration:  distracted   Fund of Knowledge:  diminished     Assessment and Diagnosis   Status/Progress: Based on the examination today, the patient's problem(s) is/are well controlled.  New problems have not been presented today.   Co-morbidities are complicating management of the primary condition.  The working differential for this  patient includes Atrial fibrillation.     General Impression: Pt has responded well to treatment for memory, mood, and behavioral problems related to vascular dementia of about 1 year duration.      ICD-10-CM ICD-9-CM   1. Major neurocognitive disorder  F03.90 294.20       Intervention/Counseling/Treatment Plan   Medication Management: Continue current medications.  Continue general medical and cardiovascular care, especially anticoagulation therapy of AF.      Return to Clinic: 6 months

## 2023-05-12 ENCOUNTER — ANTI-COAG VISIT (OUTPATIENT)
Dept: CARDIOLOGY | Facility: CLINIC | Age: 83
End: 2023-05-12
Payer: MEDICARE

## 2023-05-12 DIAGNOSIS — Z79.01 ANTICOAGULATED WITH WARFARIN: Primary | ICD-10-CM

## 2023-05-12 LAB — INR PPP: 2.8

## 2023-05-12 PROCEDURE — 93793 ANTICOAG MGMT PT WARFARIN: CPT | Mod: S$GLB,,,

## 2023-05-12 PROCEDURE — 93793 PR ANTICOAGULANT MGMT FOR PT TAKING WARFARIN: ICD-10-PCS | Mod: S$GLB,,,

## 2023-05-24 ENCOUNTER — ANTI-COAG VISIT (OUTPATIENT)
Dept: CARDIOLOGY | Facility: CLINIC | Age: 83
End: 2023-05-24
Payer: MEDICARE

## 2023-05-24 DIAGNOSIS — Z79.01 ANTICOAGULATED WITH WARFARIN: Primary | ICD-10-CM

## 2023-05-24 LAB — INR PPP: 2.3

## 2023-05-24 PROCEDURE — 93793 PR ANTICOAGULANT MGMT FOR PT TAKING WARFARIN: ICD-10-PCS | Mod: S$GLB,,,

## 2023-05-24 PROCEDURE — 93793 ANTICOAG MGMT PT WARFARIN: CPT | Mod: S$GLB,,,

## 2023-06-08 ENCOUNTER — ANTI-COAG VISIT (OUTPATIENT)
Dept: CARDIOLOGY | Facility: CLINIC | Age: 83
End: 2023-06-08
Payer: MEDICARE

## 2023-06-08 DIAGNOSIS — Z79.01 ANTICOAGULATED WITH WARFARIN: Primary | ICD-10-CM

## 2023-06-08 LAB — INR PPP: 3.2

## 2023-06-08 PROCEDURE — 93793 PR ANTICOAGULANT MGMT FOR PT TAKING WARFARIN: ICD-10-PCS | Mod: S$GLB,,,

## 2023-06-08 PROCEDURE — 93793 ANTICOAG MGMT PT WARFARIN: CPT | Mod: S$GLB,,,

## 2023-06-08 NOTE — PROGRESS NOTES
INR not at goal. Medications, chart, and patient findings reviewed. See calendar for adjustments to dose and follow up plan.  Re-challenge dosing & re-assess in 2 weeks.

## 2023-06-26 PROBLEM — N17.9 AKI (ACUTE KIDNEY INJURY): Status: RESOLVED | Noted: 2023-03-20 | Resolved: 2023-06-26

## 2023-06-28 ENCOUNTER — ANTI-COAG VISIT (OUTPATIENT)
Dept: CARDIOLOGY | Facility: CLINIC | Age: 83
End: 2023-06-28
Payer: MEDICARE

## 2023-06-28 DIAGNOSIS — Z79.01 ANTICOAGULATED WITH WARFARIN: Primary | ICD-10-CM

## 2023-06-28 LAB
INR PPP: 3.2
INR PPP: 3.5

## 2023-06-28 PROCEDURE — 93793 ANTICOAG MGMT PT WARFARIN: CPT | Mod: S$GLB,,,

## 2023-06-28 PROCEDURE — 93793 PR ANTICOAGULANT MGMT FOR PT TAKING WARFARIN: ICD-10-PCS | Mod: S$GLB,,,

## 2023-06-28 NOTE — PROGRESS NOTES
6/28/23 Gr Daughter Humble called in a verbal result as: INR -3.5 dated 6/27/23, reports result on 6/21 was 3. And states both results wee entered in [facility] (no result was received in coumadin clinic)

## 2023-07-06 ENCOUNTER — ANTI-COAG VISIT (OUTPATIENT)
Dept: CARDIOLOGY | Facility: CLINIC | Age: 83
End: 2023-07-06
Payer: MEDICARE

## 2023-07-06 DIAGNOSIS — Z79.01 ANTICOAGULATED WITH WARFARIN: Primary | ICD-10-CM

## 2023-07-06 LAB — INR PPP: 2.1

## 2023-07-06 PROCEDURE — 93793 ANTICOAG MGMT PT WARFARIN: CPT | Mod: S$GLB,,,

## 2023-07-06 PROCEDURE — 93793 PR ANTICOAGULANT MGMT FOR PT TAKING WARFARIN: ICD-10-PCS | Mod: S$GLB,,,

## 2023-07-06 NOTE — PROGRESS NOTES
Granddaughter Humble called in a verbal result as: INR -2.1 dated today 7/06/23, she verified correct warfarin dose, reports no changes, Humble was advised of dose for today 7/06

## 2023-07-14 ENCOUNTER — ANTI-COAG VISIT (OUTPATIENT)
Dept: CARDIOLOGY | Facility: CLINIC | Age: 83
End: 2023-07-14
Payer: MEDICARE

## 2023-07-14 DIAGNOSIS — Z79.01 ANTICOAGULATED WITH WARFARIN: Primary | ICD-10-CM

## 2023-07-14 LAB — INR PPP: 2.3

## 2023-07-14 PROCEDURE — 93793 PR ANTICOAGULANT MGMT FOR PT TAKING WARFARIN: ICD-10-PCS | Mod: S$GLB,,,

## 2023-07-14 PROCEDURE — 93793 ANTICOAG MGMT PT WARFARIN: CPT | Mod: S$GLB,,,

## 2023-07-14 NOTE — PROGRESS NOTES
7/14/23 GrDaughter Humble called in a verbal result as INR -2.3 dated 7/13/23, she verified correct warfarin dose, reports no changes

## 2023-07-21 ENCOUNTER — ANTI-COAG VISIT (OUTPATIENT)
Dept: CARDIOLOGY | Facility: CLINIC | Age: 83
End: 2023-07-21
Payer: MEDICARE

## 2023-07-21 DIAGNOSIS — Z79.01 ANTICOAGULATED WITH WARFARIN: Primary | ICD-10-CM

## 2023-07-21 LAB — INR PPP: 2.8

## 2023-07-21 PROCEDURE — 93793 ANTICOAG MGMT PT WARFARIN: CPT | Mod: S$GLB,,,

## 2023-07-21 PROCEDURE — 93793 PR ANTICOAGULANT MGMT FOR PT TAKING WARFARIN: ICD-10-PCS | Mod: S$GLB,,,

## 2023-07-21 NOTE — PROGRESS NOTES
Granddaughter Humble called in a verbal result as: INR 2.8 dated 7/20/23, she verified correct warfarin dose, reports no changes

## 2023-07-21 NOTE — PROGRESS NOTES
Dr Johnson notified pt bp 93/69. Per md hold of the rate.    INR at goal. Medications and chart reviewed. No changes noted to necessitate adjustment of warfarin or follow-up plan. See calendar.

## 2023-09-27 ENCOUNTER — ANTI-COAG VISIT (OUTPATIENT)
Dept: CARDIOLOGY | Facility: CLINIC | Age: 83
End: 2023-09-27
Payer: MEDICARE

## 2023-09-27 DIAGNOSIS — Z79.01 ANTICOAGULATED WITH WARFARIN: Primary | ICD-10-CM

## 2023-09-27 LAB — INR PPP: 2

## 2023-09-27 PROCEDURE — 93793 ANTICOAG MGMT PT WARFARIN: CPT | Mod: S$GLB,,,

## 2023-09-27 PROCEDURE — 93793 PR ANTICOAGULANT MGMT FOR PT TAKING WARFARIN: ICD-10-PCS | Mod: S$GLB,,,

## 2023-10-11 ENCOUNTER — ANTI-COAG VISIT (OUTPATIENT)
Dept: CARDIOLOGY | Facility: CLINIC | Age: 83
End: 2023-10-11
Payer: MEDICARE

## 2023-10-11 DIAGNOSIS — Z79.01 ANTICOAGULATED WITH WARFARIN: Primary | ICD-10-CM

## 2023-10-11 LAB — INR PPP: 3.2

## 2023-10-11 PROCEDURE — 93793 ANTICOAG MGMT PT WARFARIN: CPT | Mod: S$GLB,,,

## 2023-10-11 PROCEDURE — 93793 PR ANTICOAGULANT MGMT FOR PT TAKING WARFARIN: ICD-10-PCS | Mod: S$GLB,,,

## 2023-10-17 RX ORDER — MEMANTINE HYDROCHLORIDE 5 MG/1
5 TABLET ORAL 2 TIMES DAILY
Qty: 60 TABLET | Refills: 0 | Status: SHIPPED | OUTPATIENT
Start: 2023-10-17 | End: 2023-11-01 | Stop reason: SDUPTHER

## 2023-10-25 ENCOUNTER — ANTI-COAG VISIT (OUTPATIENT)
Dept: CARDIOLOGY | Facility: CLINIC | Age: 83
End: 2023-10-25
Payer: MEDICARE

## 2023-10-25 DIAGNOSIS — Z79.01 ANTICOAGULATED WITH WARFARIN: Primary | ICD-10-CM

## 2023-10-25 LAB — INR PPP: 2.3

## 2023-10-25 PROCEDURE — 93793 PR ANTICOAGULANT MGMT FOR PT TAKING WARFARIN: ICD-10-PCS | Mod: S$GLB,,,

## 2023-10-25 PROCEDURE — 93793 ANTICOAG MGMT PT WARFARIN: CPT | Mod: S$GLB,,,

## 2023-11-01 ENCOUNTER — OFFICE VISIT (OUTPATIENT)
Dept: PSYCHIATRY | Facility: CLINIC | Age: 83
End: 2023-11-01
Payer: COMMERCIAL

## 2023-11-01 VITALS — SYSTOLIC BLOOD PRESSURE: 91 MMHG | HEART RATE: 85 BPM | DIASTOLIC BLOOD PRESSURE: 55 MMHG

## 2023-11-01 DIAGNOSIS — F03.90 MAJOR NEUROCOGNITIVE DISORDER: Primary | ICD-10-CM

## 2023-11-01 PROCEDURE — 3078F DIAST BP <80 MM HG: CPT | Mod: CPTII,S$GLB,, | Performed by: PSYCHIATRY & NEUROLOGY

## 2023-11-01 PROCEDURE — 90833 PSYTX W PT W E/M 30 MIN: CPT | Mod: S$GLB,,, | Performed by: PSYCHIATRY & NEUROLOGY

## 2023-11-01 PROCEDURE — 3074F PR MOST RECENT SYSTOLIC BLOOD PRESSURE < 130 MM HG: ICD-10-PCS | Mod: CPTII,S$GLB,, | Performed by: PSYCHIATRY & NEUROLOGY

## 2023-11-01 PROCEDURE — 3074F SYST BP LT 130 MM HG: CPT | Mod: CPTII,S$GLB,, | Performed by: PSYCHIATRY & NEUROLOGY

## 2023-11-01 PROCEDURE — 1159F MED LIST DOCD IN RCRD: CPT | Mod: CPTII,S$GLB,, | Performed by: PSYCHIATRY & NEUROLOGY

## 2023-11-01 PROCEDURE — 1160F RVW MEDS BY RX/DR IN RCRD: CPT | Mod: CPTII,S$GLB,, | Performed by: PSYCHIATRY & NEUROLOGY

## 2023-11-01 PROCEDURE — 99999 PR PBB SHADOW E&M-EST. PATIENT-LVL III: ICD-10-PCS | Mod: PBBFAC,,, | Performed by: PSYCHIATRY & NEUROLOGY

## 2023-11-01 PROCEDURE — 99999 PR PBB SHADOW E&M-EST. PATIENT-LVL III: CPT | Mod: PBBFAC,,, | Performed by: PSYCHIATRY & NEUROLOGY

## 2023-11-01 PROCEDURE — 1159F PR MEDICATION LIST DOCUMENTED IN MEDICAL RECORD: ICD-10-PCS | Mod: CPTII,S$GLB,, | Performed by: PSYCHIATRY & NEUROLOGY

## 2023-11-01 PROCEDURE — 3078F PR MOST RECENT DIASTOLIC BLOOD PRESSURE < 80 MM HG: ICD-10-PCS | Mod: CPTII,S$GLB,, | Performed by: PSYCHIATRY & NEUROLOGY

## 2023-11-01 PROCEDURE — 99213 PR OFFICE/OUTPT VISIT, EST, LEVL III, 20-29 MIN: ICD-10-PCS | Mod: S$GLB,,, | Performed by: PSYCHIATRY & NEUROLOGY

## 2023-11-01 PROCEDURE — 99213 OFFICE O/P EST LOW 20 MIN: CPT | Mod: S$GLB,,, | Performed by: PSYCHIATRY & NEUROLOGY

## 2023-11-01 PROCEDURE — 90833 PR PSYCHOTHERAPY W/PATIENT W/E&M, 30 MIN (ADD ON): ICD-10-PCS | Mod: S$GLB,,, | Performed by: PSYCHIATRY & NEUROLOGY

## 2023-11-01 PROCEDURE — 1160F PR REVIEW ALL MEDS BY PRESCRIBER/CLIN PHARMACIST DOCUMENTED: ICD-10-PCS | Mod: CPTII,S$GLB,, | Performed by: PSYCHIATRY & NEUROLOGY

## 2023-11-01 RX ORDER — CITALOPRAM 20 MG/1
20 TABLET, FILM COATED ORAL DAILY
Qty: 90 TABLET | Refills: 2 | Status: SHIPPED | OUTPATIENT
Start: 2023-11-01 | End: 2024-03-04 | Stop reason: SDUPTHER

## 2023-11-01 RX ORDER — MEMANTINE HYDROCHLORIDE 5 MG/1
5 TABLET ORAL 2 TIMES DAILY
Qty: 180 TABLET | Refills: 2 | Status: SHIPPED | OUTPATIENT
Start: 2023-11-01 | End: 2024-03-04 | Stop reason: SDUPTHER

## 2023-11-01 NOTE — Clinical Note
Mr. Spain began care here in the Geriatric Clinic. He had only 2 staff visits before my FDC.  I suggested he return for f/u in the Geriatric clinic in 6 months while he and his wife find another staff doctor they want to see individually.

## 2023-11-01 NOTE — PROGRESS NOTES
Outpatient Psychiatry Follow-Up Visit (MD/NP)    11/1/2023    Clinical Status of Patient:  Outpatient (Ambulatory)    Chief Complaint:  Bright Spain is a 83 y.o. male who presents today for follow-up of memory problem and behavior problems.  Met with patient and spouse.      Interval History and Content of Current Session:  Interim Events/Subjective Report/Content of Current Session:  Mr. Spain returned casually dressed and groomed, ambulating via wheelchair.  He was alert with euthymic mood and pleasant affect.  In this smaller group he was more spontaneous and he reported his life to be good.  He acknowledged occasional memory gaps but he takes his time and works around them.  Neither he nor his wife reported any depression, anxiety, irritability, or behavioral issues.  My intermediate next week was discussed along with transition of care.    Medications were reviewed.  Sublingual B12 is needed to prevent recurrence of a low B12 level of 231.  Other psychotropics are well tolerated.  No adverse effects were identified.  Risks of antipsychotic therapy have been carefully covered at his last visit.  No changes were requested or recommended.  Because Pt began treatment in the geriatric clinic, f/u through that clinic until he finds another staff psychiatrist he likes was suggested.  His wife was told she can call or message me through the end of the year if needed.    Psychotherapy:  Target symptoms: depression, anxiety , confusion, memory loss  Why chosen therapy is appropriate versus another modality: relevant to diagnosis, patient responds to this modality, evidence based practice  Outcome monitoring methods: self-report, observation, feedback from family  Therapeutic intervention type: behavior modifying psychotherapy, supportive psychotherapy, psychoeducation with family  Topics discussed/themes:  caregiver issues, building skills sets for symptom management, symptom recognition, life stage transitional  issues  The patient's response to the intervention is accepting. The patient's progress toward treatment goals is good.   Duration of intervention: 28 minutes.    Review of Systems   PSYCHIATRIC: Pertinant items are noted in the narrative.  CARDIOVASCULAR: Atrial fibrillation, Cardiomyopathy    Past Medical, Family and Social History: The patient's past medical, family and social history have been reviewed and updated as appropriate within the electronic medical record - see encounter notes.    Compliance: yes    Side effects: None    Risk Parameters:  Patient reports no suicidal ideation  Patient reports no homicidal ideation  Patient reports no self-injurious behavior  Patient reports no violent behavior    Exam (detailed: at least 9 elements; comprehensive: all 15 elements)   Constitutional  Vitals:  Most recent vital signs, dated less than 90 days prior to this appointment, were reviewed.   Vitals:    11/01/23 1316   BP: (!) 91/55   Pulse: 85        General:  age appropriate, well nourished, casually dressed, seated in wheelchair     Musculoskeletal  Muscle Strength/Tone:  no rigidity, no dyskinesia, no dystonia, no tremor   Gait & Station:  in wheelchair     Psychiatric  Speech:  no latency; no press, spontaneous, non-spontaneous   Mood & Affect:  euthymic  shallow   Thought Process:  concrete   Associations:  intact   Thought Content:  normal, no suicidality, no homicidality, delusions, or paranoia   Insight:  limited awareness of illness   Judgement: impaired due to memory loss   Orientation:  person, place, situation   Memory: Gaps evident   Language: grossly intact   Attention Span & Concentration:  able to focus   Fund of Knowledge:  diminished     Assessment and Diagnosis   Status/Progress: Based on the examination today, the patient's problem(s) is/are improved.  New problems have not been presented today.   Co-morbidities are complicating management of the primary condition.  The working differential  for this patient includes Atrial fibrillation.     General Impression: Pt continues to respond well to treatment for memory, mood, and behavioral problems related to vascular dementia.      ICD-10-CM ICD-9-CM   1. Major neurocognitive disorder  F03.90 294.20       Intervention/Counseling/Treatment Plan   Medication Management: Continue current medications.  Continue general medical and cardiovascular care, especially anticoagulation therapy of AF.      Return to Clinic: 6 months in Geriatric clinic.

## 2023-11-09 ENCOUNTER — TELEPHONE (OUTPATIENT)
Dept: CARDIOLOGY | Facility: CLINIC | Age: 83
End: 2023-11-09
Payer: MEDICARE

## 2023-11-09 ENCOUNTER — ANTI-COAG VISIT (OUTPATIENT)
Dept: CARDIOLOGY | Facility: CLINIC | Age: 83
End: 2023-11-09
Payer: MEDICARE

## 2023-11-09 DIAGNOSIS — Z79.01 ANTICOAGULATED WITH WARFARIN: Primary | ICD-10-CM

## 2023-11-09 LAB — INR PPP: 2.2

## 2023-11-09 PROCEDURE — 93793 ANTICOAG MGMT PT WARFARIN: CPT | Mod: S$GLB,,,

## 2023-11-09 PROCEDURE — 93793 PR ANTICOAGULANT MGMT FOR PT TAKING WARFARIN: ICD-10-PCS | Mod: S$GLB,,,

## 2023-11-09 NOTE — TELEPHONE ENCOUNTER
Spoke with granddaughter.  Assisted her with making an appointment to re-establish care with cardiology.    ----- Message from Marcy Kendall sent at 11/9/2023 10:04 AM CST -----  Contact: howie  Type:  Needs Medical Advice    Who Called: pt  Symptoms (please be specific): pt needs to get in to see a new Cardiologist for his AFIB please schedule as soon as possible    Would the patient rather a call back or a response via [facility]? call  Best Call Back Number: [phone]   Additional Information:

## 2023-11-17 ENCOUNTER — ANTI-COAG VISIT (OUTPATIENT)
Dept: CARDIOLOGY | Facility: CLINIC | Age: 83
End: 2023-11-17
Payer: MEDICARE

## 2023-11-17 DIAGNOSIS — Z79.01 ANTICOAGULATED WITH WARFARIN: Primary | ICD-10-CM

## 2023-11-17 LAB — INR PPP: 4

## 2023-11-17 PROCEDURE — 93793 ANTICOAG MGMT PT WARFARIN: CPT | Mod: S$GLB,,,

## 2023-11-17 PROCEDURE — 93793 PR ANTICOAGULANT MGMT FOR PT TAKING WARFARIN: ICD-10-PCS | Mod: S$GLB,,,

## 2023-11-28 ENCOUNTER — ANTI-COAG VISIT (OUTPATIENT)
Dept: CARDIOLOGY | Facility: CLINIC | Age: 83
End: 2023-11-28
Payer: MEDICARE

## 2023-11-28 DIAGNOSIS — Z79.01 ANTICOAGULATED WITH WARFARIN: Primary | ICD-10-CM

## 2023-11-28 LAB — INR PPP: 2.2

## 2023-11-28 PROCEDURE — 93793 PR ANTICOAGULANT MGMT FOR PT TAKING WARFARIN: ICD-10-PCS | Mod: S$GLB,,,

## 2023-11-28 PROCEDURE — 93793 ANTICOAG MGMT PT WARFARIN: CPT | Mod: S$GLB,,,

## 2023-12-13 ENCOUNTER — ANTI-COAG VISIT (OUTPATIENT)
Dept: CARDIOLOGY | Facility: CLINIC | Age: 83
End: 2023-12-13
Payer: MEDICARE

## 2023-12-13 DIAGNOSIS — Z79.01 ANTICOAGULATED WITH WARFARIN: Primary | ICD-10-CM

## 2023-12-13 LAB — INR PPP: 2.2

## 2023-12-13 PROCEDURE — 93793 PR ANTICOAGULANT MGMT FOR PT TAKING WARFARIN: ICD-10-PCS | Mod: S$GLB,,,

## 2023-12-13 PROCEDURE — 93793 ANTICOAG MGMT PT WARFARIN: CPT | Mod: S$GLB,,,

## 2023-12-27 ENCOUNTER — OFFICE VISIT (OUTPATIENT)
Dept: CARDIOLOGY | Facility: CLINIC | Age: 83
End: 2023-12-27
Payer: MEDICARE

## 2023-12-27 ENCOUNTER — ANTI-COAG VISIT (OUTPATIENT)
Dept: CARDIOLOGY | Facility: CLINIC | Age: 83
End: 2023-12-27
Payer: MEDICARE

## 2023-12-27 VITALS
HEIGHT: 73 IN | HEART RATE: 96 BPM | DIASTOLIC BLOOD PRESSURE: 63 MMHG | OXYGEN SATURATION: 98 % | BODY MASS INDEX: 22.06 KG/M2 | WEIGHT: 166.44 LBS | SYSTOLIC BLOOD PRESSURE: 111 MMHG

## 2023-12-27 DIAGNOSIS — I47.10 SVT (SUPRAVENTRICULAR TACHYCARDIA): ICD-10-CM

## 2023-12-27 DIAGNOSIS — I25.5 ISCHEMIC CARDIOMYOPATHY: ICD-10-CM

## 2023-12-27 DIAGNOSIS — E78.00 PURE HYPERCHOLESTEROLEMIA: ICD-10-CM

## 2023-12-27 DIAGNOSIS — I25.119 CORONARY ARTERY DISEASE INVOLVING NATIVE CORONARY ARTERY OF NATIVE HEART WITH ANGINA PECTORIS: Primary | ICD-10-CM

## 2023-12-27 DIAGNOSIS — I10 PRIMARY HYPERTENSION: ICD-10-CM

## 2023-12-27 DIAGNOSIS — I48.0 PAROXYSMAL A-FIB: ICD-10-CM

## 2023-12-27 DIAGNOSIS — Z79.01 ANTICOAGULATED WITH WARFARIN: Primary | ICD-10-CM

## 2023-12-27 PROBLEM — I48.91 ATRIAL FIBRILLATION WITH RVR: Status: RESOLVED | Noted: 2022-09-22 | Resolved: 2023-12-27

## 2023-12-27 PROBLEM — I34.0 MODERATE MITRAL INSUFFICIENCY: Status: RESOLVED | Noted: 2017-11-03 | Resolved: 2023-12-27

## 2023-12-27 LAB — INR PPP: 4.9

## 2023-12-27 PROCEDURE — 3288F FALL RISK ASSESSMENT DOCD: CPT | Mod: CPTII,S$GLB,, | Performed by: PHYSICIAN ASSISTANT

## 2023-12-27 PROCEDURE — 1160F PR REVIEW ALL MEDS BY PRESCRIBER/CLIN PHARMACIST DOCUMENTED: ICD-10-PCS | Mod: CPTII,S$GLB,, | Performed by: PHYSICIAN ASSISTANT

## 2023-12-27 PROCEDURE — 3078F PR MOST RECENT DIASTOLIC BLOOD PRESSURE < 80 MM HG: ICD-10-PCS | Mod: CPTII,S$GLB,, | Performed by: PHYSICIAN ASSISTANT

## 2023-12-27 PROCEDURE — 93793 ANTICOAG MGMT PT WARFARIN: CPT | Mod: S$GLB,,,

## 2023-12-27 PROCEDURE — 1160F RVW MEDS BY RX/DR IN RCRD: CPT | Mod: CPTII,S$GLB,, | Performed by: PHYSICIAN ASSISTANT

## 2023-12-27 PROCEDURE — 99214 OFFICE O/P EST MOD 30 MIN: CPT | Mod: S$GLB,,, | Performed by: PHYSICIAN ASSISTANT

## 2023-12-27 PROCEDURE — 99999 PR PBB SHADOW E&M-EST. PATIENT-LVL V: ICD-10-PCS | Mod: PBBFAC,,, | Performed by: PHYSICIAN ASSISTANT

## 2023-12-27 PROCEDURE — 1126F AMNT PAIN NOTED NONE PRSNT: CPT | Mod: CPTII,S$GLB,, | Performed by: PHYSICIAN ASSISTANT

## 2023-12-27 PROCEDURE — 1101F PT FALLS ASSESS-DOCD LE1/YR: CPT | Mod: CPTII,S$GLB,, | Performed by: PHYSICIAN ASSISTANT

## 2023-12-27 PROCEDURE — 1101F PR PT FALLS ASSESS DOC 0-1 FALLS W/OUT INJ PAST YR: ICD-10-PCS | Mod: CPTII,S$GLB,, | Performed by: PHYSICIAN ASSISTANT

## 2023-12-27 PROCEDURE — 99214 PR OFFICE/OUTPT VISIT, EST, LEVL IV, 30-39 MIN: ICD-10-PCS | Mod: S$GLB,,, | Performed by: PHYSICIAN ASSISTANT

## 2023-12-27 PROCEDURE — 93793 PR ANTICOAGULANT MGMT FOR PT TAKING WARFARIN: ICD-10-PCS | Mod: S$GLB,,,

## 2023-12-27 PROCEDURE — 3078F DIAST BP <80 MM HG: CPT | Mod: CPTII,S$GLB,, | Performed by: PHYSICIAN ASSISTANT

## 2023-12-27 PROCEDURE — 99999 PR PBB SHADOW E&M-EST. PATIENT-LVL V: CPT | Mod: PBBFAC,,, | Performed by: PHYSICIAN ASSISTANT

## 2023-12-27 PROCEDURE — 1126F PR PAIN SEVERITY QUANTIFIED, NO PAIN PRESENT: ICD-10-PCS | Mod: CPTII,S$GLB,, | Performed by: PHYSICIAN ASSISTANT

## 2023-12-27 PROCEDURE — 1159F PR MEDICATION LIST DOCUMENTED IN MEDICAL RECORD: ICD-10-PCS | Mod: CPTII,S$GLB,, | Performed by: PHYSICIAN ASSISTANT

## 2023-12-27 PROCEDURE — 3074F SYST BP LT 130 MM HG: CPT | Mod: CPTII,S$GLB,, | Performed by: PHYSICIAN ASSISTANT

## 2023-12-27 PROCEDURE — 3074F PR MOST RECENT SYSTOLIC BLOOD PRESSURE < 130 MM HG: ICD-10-PCS | Mod: CPTII,S$GLB,, | Performed by: PHYSICIAN ASSISTANT

## 2023-12-27 PROCEDURE — 1159F MED LIST DOCD IN RCRD: CPT | Mod: CPTII,S$GLB,, | Performed by: PHYSICIAN ASSISTANT

## 2023-12-27 PROCEDURE — 3288F PR FALLS RISK ASSESSMENT DOCUMENTED: ICD-10-PCS | Mod: CPTII,S$GLB,, | Performed by: PHYSICIAN ASSISTANT

## 2023-12-27 RX ORDER — EZETIMIBE 10 MG/1
10 TABLET ORAL
COMMUNITY
Start: 2023-12-05

## 2023-12-27 RX ORDER — ZINC GLUCONATE 50 MG
1000 TABLET ORAL
COMMUNITY
Start: 2023-11-06

## 2023-12-27 NOTE — PROGRESS NOTES
General Cardiology Clinic Note  Reason for Visit: Follow up   Last Clinic Visit: 3/11/2020 with Dr. Santillan     HPI:   Bright Spain is a 83 y.o. male who presents to re-establish care.     Problems:  Severe CAD, declined CABG, on medical management  Ischemic cardiomyopathy (EF recovered)  Hypertension  Hyperlipidemia  Paroxysmal atrial fibrillation    SVT  Dementia     HPI  Patient presents to re-establish care with cardiology. He is with his wife and granddaughter who assist with the history. Only concern is that he has occasional hypotension. Most recent episode was a couple weeks ago. He went to the ED at [facility] and was found to be dehydrated. He was reportedly just given IV fluids and discharged. Records are not available. His wife checks his BP regularly at home and usually it is normal. He was taken off Losartan about a year ago for hypotension. He is working on increasing fluid intake. He denies chest pains, SOB, orthopnea, PND, edema, syncope, near syncope, palpitations. He does not exercise. He is in a wheelchair due to leg weakness. Currently c/o URI and cough which is getting better with OTC medicine.     ROS:      Review of Systems   Constitutional: Negative for diaphoresis, malaise/fatigue, weight gain and weight loss.   HENT:  Negative for nosebleeds.    Eyes:  Negative for vision loss in left eye, vision loss in right eye and visual disturbance.   Cardiovascular:  Negative for chest pain, claudication, dyspnea on exertion, irregular heartbeat, leg swelling, near-syncope, orthopnea, palpitations, paroxysmal nocturnal dyspnea and syncope.   Respiratory:  Negative for cough, shortness of breath, sleep disturbances due to breathing, snoring and wheezing.    Hematologic/Lymphatic: Negative for bleeding problem. Does not bruise/bleed easily.   Skin:  Negative for poor wound healing and rash.   Musculoskeletal:  Negative for muscle cramps and myalgias.   Gastrointestinal:  Negative for  bloating, abdominal pain, diarrhea, heartburn, melena, nausea and vomiting.   Genitourinary:  Negative for hematuria and nocturia.   Neurological:  Positive for weakness. Negative for brief paralysis, dizziness, headaches, light-headedness and numbness.   Psychiatric/Behavioral:  Negative for depression.    Allergic/Immunologic: Negative for hives.       PMH:     Past Medical History:   Diagnosis Date    Atrial fibrillation     Gout     Hyperlipidemia     Hypertension      Past Surgical History:   Procedure Laterality Date    CARDIOVERSION  3/15/16    SOPHIA prior     Allergies:   Review of patient's allergies indicates:  No Known Allergies  Medications:     Current Outpatient Medications on File Prior to Visit   Medication Sig Dispense Refill    acetaminophen (TYLENOL) 325 MG tablet Take 650 mg by mouth every 6 (six) hours as needed for Pain. Added by MAR (Medication Administration Report)      allopurinol (ZYLOPRIM) 300 MG tablet Take 300 mg by mouth once daily.      aspirin 81 MG Chew CHEW 1 TABLET BY MOUTH EVERY MORNING      atorvastatin (LIPITOR) 40 MG tablet Take 40 mg by mouth every evening.      citalopram (CELEXA) 20 MG tablet Take 1 tablet (20 mg total) by mouth once daily. 90 tablet 2    cyanocobalamin-cobamamide (B-12 PLUS) 5,000-100 mcg Subl Place 1,000 mcg under the tongue once daily. 90 tablet 2    finasteride (PROSCAR) 5 mg tablet Take 5 mg by mouth once daily.      gabapentin (NEURONTIN) 300 MG capsule Take 300 mg by mouth 3 (three) times daily.      losartan (COZAAR) 50 MG tablet Take 1 tablet (50 mg total) by mouth every evening.      memantine (NAMENDA) 5 MG Tab Take 1 tablet (5 mg total) by mouth 2 (two) times daily. 180 tablet 2    tamsulosin (FLOMAX) 0.4 mg Cp24 Take 0.4 mg by mouth once daily.      warfarin (COUMADIN) 4 MG tablet Take by mouth Daily. Take 1 tablet by mouth on Thursday; and 2 tablets on Friday, Saturday, and Monday; and 1 and 1/2 tablet on Sunday per [facility] Clinic       No  "current facility-administered medications on file prior to visit.     Social History:     Social History     Tobacco Use    Smoking status: Former     Current packs/day: 0.00     Types: Cigarettes     Start date: 1950     Quit date: 1970     Years since quittin.0    Smokeless tobacco: Never   Substance Use Topics    Alcohol use: Yes     Alcohol/week: 2.0 standard drinks of alcohol     Types: 2 Cans of beer per week     Family History:   No family history on file.  Physical Exam:   /63   Pulse 96   Ht 6' 1" (1.854 m)   Wt 75.5 kg (166 lb 7.2 oz)   SpO2 98%   BMI 21.96 kg/m²        Physical Exam  Vitals and nursing note reviewed.   Constitutional:       General: He is not in acute distress.     Appearance: Normal appearance.   HENT:      Head: Normocephalic and atraumatic.      Nose: Nose normal.   Eyes:      General: No scleral icterus.     Extraocular Movements: Extraocular movements intact.      Conjunctiva/sclera: Conjunctivae normal.   Neck:      Thyroid: No thyromegaly.      Vascular: No carotid bruit or JVD.   Cardiovascular:      Rate and Rhythm: Normal rate and regular rhythm.      Pulses: Normal pulses.      Heart sounds: Normal heart sounds. No murmur heard.     No friction rub. No gallop.   Pulmonary:      Effort: Pulmonary effort is normal.      Breath sounds: Normal breath sounds. No wheezing, rhonchi or rales.   Chest:      Chest wall: No tenderness.   Abdominal:      General: Bowel sounds are normal. There is no distension.      Palpations: Abdomen is soft.      Tenderness: There is no abdominal tenderness.   Musculoskeletal:      Cervical back: Neck supple.      Right lower leg: No edema.      Left lower leg: No edema.   Skin:     General: Skin is warm and dry.      Coloration: Skin is not pale.      Findings: No erythema or rash.      Nails: There is no clubbing.   Neurological:      Mental Status: He is alert and oriented to person, place, and time.   Psychiatric:         " Mood and Affect: Mood and affect normal.         Behavior: Behavior normal.          Labs:     Lab Results   Component Value Date     03/21/2023    K 3.8 03/21/2023     03/21/2023    CO2 23 03/21/2023    BUN 15 03/21/2023    CREATININE 1.3 03/21/2023    ANIONGAP 9 03/21/2023     Lab Results   Component Value Date    HGBA1C 5.7 (H) 11/17/2017     Lab Results   Component Value Date     (H) 09/22/2022    Lab Results   Component Value Date    WBC 5.47 03/21/2023    HGB 11.2 (L) 03/21/2023    HCT 34.5 (L) 03/21/2023     (L) 03/21/2023    GRAN 3.1 03/21/2023    GRAN 57.3 03/21/2023     Lab Results   Component Value Date    CHOL 168 11/17/2017    HDL 40 11/17/2017    LDLCALC 104.4 11/17/2017    TRIG 118 11/17/2017          Imaging:   Echocardiograms:   TTE 3/21/2023  Concentric hypertrophy and  The estimated ejection fraction is 65%.  Normal left ventricular diastolic function.  Normal right ventricular size with normal right ventricular systolic function.  There is mild aortic valve stenosis.  Aortic valve area is 2.69 cm2; peak velocity is 1.78 m/s; mean gradient is 8 mmHg.  Mild mitral regurgitation.  Normal central venous pressure (3 mmHg).  The estimated PA systolic pressure is 20 mmHg.    TTE 9/23/2022  The left ventricle is normal in size with concentric remodeling and low normal systolic function.  The estimated ejection fraction is 50%.  Normal left ventricular diastolic function.  Normal right ventricular size with normal right ventricular systolic function.  Mild mitral regurgitation.  Bradycardia.    TTE 2/27/2020  Low normal left ventricular systolic function. The estimated ejection fraction is 50%.  Concentric left ventricular hypertrophy.  Left ventricular diastolic dysfunction.  No wall motion abnormalities.  Normal right ventricular systolic function.  Mild-to-moderate mitral regurgitation.  Mild tricuspid regurgitation.  Mild left atrial enlargement.  Mild right atrial  enlargement.  The estimated PA systolic pressure is 30 mmHg.  Normal central venous pressure (3 mmHg).    TTE 4/11/2016    1 - Severely depressed left ventricular systolic function (EF 25-30%).     2 - Moderately depressed right ventricular systolic function .     3 - Pulmonary hypertension. The estimated PA systolic pressure is 59 mmHg.     4 - Moderate to severe mitral regurgitation.     5 - Mild left atrial enlargement.     6 - Increased central venous pressure.     Stress Tests:   Nuclear stress test 4/11/2016  Impression:   RIGHT SIDE:   1 - 39 % right ICA stenosis.   Heterogeneous plaque in the right internal carotid artery.   Heterogeneous plaque in the right common carotid artery.   Antegrade flow in the right vertebral artery.   LEFT SIDE:   1 - 39% left ICA stenosis.   Heterogeneous plaque in the left internal carotid artery.   Homogeneous plaque in the left common carotid artery.   Antegrade flow in the left vertebral artery.     Caths:   [facility] 4/18/2016  Global hypokinesis with anterior and inferior severe hypokinesis with EF 30%.                . Normal LVEDP 4 mm Hg.                . Mild Mitral regurgitation (1+ to 2+).                . Severe calcific CAD:.                . LM ok.                . LAD proximal 70% eccentric stenosis and long mid stenosis- calcified 80%.                . D1 large caliber with 90% clcified stenosis at origin supplying posterolateral myocardium.                . LCx diminutive with mild disease.                . RCA dominant and tortuous with 70-80% eccentric proximal and 90% short mid vessel stenoses.       Other:  Carotid ultrasound 4/27/2016  Impression:   RIGHT SIDE:   1 - 39 % right ICA stenosis.   Heterogeneous plaque in the right internal carotid artery.   Heterogeneous plaque in the right common carotid artery.   Antegrade flow in the right vertebral artery.   LEFT SIDE:   1 - 39% left ICA stenosis.   Heterogeneous plaque in the left internal carotid artery.    Homogeneous plaque in the left common carotid artery.   Antegrade flow in the left vertebral artery.       Assessment:     1. Coronary artery disease involving native coronary artery of native heart with angina pectoris    2. Ischemic cardiomyopathy    3. Primary hypertension    4. Pure hypercholesterolemia    5. Paroxysmal A-fib    6. SVT (supraventricular tachycardia)      Plan:     CAD  Pt with severe MV CAD on 2016 cath. He declined CABG or stents and has been managed medically   Denies angina  Continue ASA, high intensity statin, Zetia   Increase aerobic exercise with a goal of at least 30 minutes, 5 days a week.    Ischemic cardiomyopathy  TTE from 3/2023 with normal LVEF  No longer on ACE/ARB or BB due to hypotension  No evidence of CHF    Hypertension  Controlled off of antihypertensives. Occasional hypotension likely due to dehydration  Increase fluid intake and eat salty snack if low     Hyperlipidemia  Obtain lipid panel  Continue Lipitor 40 mg daily and Zetia 10 mg daily     Paroxysmal atrial fibrillation   SVT  Asymptomatic  Continue Coumadin for CVA prophylaxis       Follow up in one year.     Signed:  Brittany Norman PA-C  Cardiology   [phone] - General

## 2023-12-27 NOTE — PROGRESS NOTES
INR not at goal. Medications, chart, and patient findings reviewed. See calendar for adjustments to dose and follow up plan.    Reviewed w/ JOHN Norman & her supervising RAND Nguyen can sign off for pt.

## 2023-12-29 ENCOUNTER — ANTI-COAG VISIT (OUTPATIENT)
Dept: CARDIOLOGY | Facility: CLINIC | Age: 83
End: 2023-12-29
Payer: MEDICARE

## 2023-12-29 ENCOUNTER — LAB VISIT (OUTPATIENT)
Dept: LAB | Facility: HOSPITAL | Age: 83
End: 2023-12-29
Attending: PHYSICIAN ASSISTANT
Payer: MEDICARE

## 2023-12-29 DIAGNOSIS — Z79.01 ANTICOAGULATED WITH WARFARIN: Primary | ICD-10-CM

## 2023-12-29 DIAGNOSIS — I25.119 CORONARY ARTERY DISEASE INVOLVING NATIVE CORONARY ARTERY OF NATIVE HEART WITH ANGINA PECTORIS: ICD-10-CM

## 2023-12-29 DIAGNOSIS — Z79.01 ANTICOAGULATED WITH WARFARIN: ICD-10-CM

## 2023-12-29 LAB
CHOLEST SERPL-MCNC: 91 MG/DL (ref 120–199)
CHOLEST/HDLC SERPL: 5.1 {RATIO} (ref 2–5)
HDLC SERPL-MCNC: 18 MG/DL (ref 40–75)
HDLC SERPL: 19.8 % (ref 20–50)
INR PPP: 3.2 (ref 0.8–1.2)
LDLC SERPL CALC-MCNC: 47.6 MG/DL (ref 63–159)
NONHDLC SERPL-MCNC: 73 MG/DL
PROTHROMBIN TIME: 32.2 SEC (ref 9–12.5)
TRIGL SERPL-MCNC: 127 MG/DL (ref 30–150)

## 2023-12-29 PROCEDURE — 85610 PROTHROMBIN TIME: CPT | Mod: PN | Performed by: INTERNAL MEDICINE

## 2023-12-29 PROCEDURE — 93793 ANTICOAG MGMT PT WARFARIN: CPT | Mod: S$GLB,,,

## 2023-12-29 PROCEDURE — 36415 COLL VENOUS BLD VENIPUNCTURE: CPT | Mod: PN | Performed by: INTERNAL MEDICINE

## 2023-12-29 PROCEDURE — 93793 PR ANTICOAGULANT MGMT FOR PT TAKING WARFARIN: ICD-10-PCS | Mod: S$GLB,,,

## 2023-12-29 PROCEDURE — 80061 LIPID PANEL: CPT | Performed by: PHYSICIAN ASSISTANT

## 2023-12-29 NOTE — PROGRESS NOTES
[facility] Anticoagulation Management [facility]    12/29/2023 11:47 AM    Assessment/Plan:    Patient presents today with supratherapeutic INR.    Assessment of patient findings and chart review: Supratherapeutic INR could be due to change in health.     Recommendation for patient's warfarin regimen: Recommend to decrease per calendar & monitor pt closely.     Recommend repeat INR in 1 week  _________________________________________________________________    Bright Spain (83 y.o.) is followed by the [facility].    Anticoagulation Summary  As of 12/29/2023      INR goal:  2.0-3.0   TTR:  64.6 % (7.6 y)   INR used for dosing:  3.2 (12/29/2023)   Warfarin maintenance plan:  8 mg (4 mg x 2) every Mon; 6 mg (4 mg x 1.5) all other days   Weekly warfarin total:  44 mg   Plan last modified:  Robb Kowalski, PharmD (6/28/2023)   Next INR check:  1/3/2024   Target end date:      Indications    Anticoagulated with warfarin [Z79.01]  Atrial fibrillation (Resolved) [I48.91]                 Anticoagulation Episode Summary       INR check location:  Home Draw    Preferred lab:  EXTERNAL    Send INR reminders to:  [facility] COUMADIN HOME MONITOR    Comments:  [employer] home meter(Tuesday) /[facility] ph [phone]  fax [phone] // (see 7/14/22 note [facility]  [place](call for RESULTS--[phone] Acct # [id number]) // 7/14/22 for all meter updated          Anticoagulation Care Providers       Provider Role Specialty Phone number    Janet Ruelas MD [facility] Cardiology [phone]

## 2024-01-01 NOTE — PROGRESS NOTES
INR at goal. Medications and chart reviewed. No changes noted to necessitate adjustment of warfarin or follow-up plan. See calendar.     UAC and UVC placed by Dr. Rico as need for frequent ABGs and need for venous access for medications and fluids. UAC at level of 16cm in good position at level of T 7 and UVC at 11 cm initially in good position but inadvertently retracted and malpositioned on repeat xray and removed prior to fluids being infused.  9/6 UAC in good position     Plan:  Maintain UAC per unit protocol  Follow placement on serially xrays

## 2024-01-04 ENCOUNTER — ANTI-COAG VISIT (OUTPATIENT)
Dept: CARDIOLOGY | Facility: CLINIC | Age: 84
End: 2024-01-04
Payer: MEDICARE

## 2024-01-04 DIAGNOSIS — Z79.01 ANTICOAGULATED WITH WARFARIN: Primary | ICD-10-CM

## 2024-01-04 LAB — INR PPP: 2.3

## 2024-01-04 PROCEDURE — 93793 ANTICOAG MGMT PT WARFARIN: CPT | Mod: S$GLB,,,

## 2024-01-24 ENCOUNTER — ANTI-COAG VISIT (OUTPATIENT)
Dept: CARDIOLOGY | Facility: CLINIC | Age: 84
End: 2024-01-24
Payer: MEDICARE

## 2024-01-24 DIAGNOSIS — Z79.01 ANTICOAGULATED WITH WARFARIN: Primary | ICD-10-CM

## 2024-01-24 LAB — INR PPP: 2.8

## 2024-01-24 PROCEDURE — 93793 ANTICOAG MGMT PT WARFARIN: CPT | Mod: S$GLB,,,

## 2024-02-07 ENCOUNTER — ANTI-COAG VISIT (OUTPATIENT)
Dept: CARDIOLOGY | Facility: CLINIC | Age: 84
End: 2024-02-07
Payer: COMMERCIAL

## 2024-02-07 DIAGNOSIS — Z79.01 ANTICOAGULATED WITH WARFARIN: Primary | ICD-10-CM

## 2024-02-07 LAB — INR PPP: 2

## 2024-02-07 PROCEDURE — 93793 ANTICOAG MGMT PT WARFARIN: CPT | Mod: S$GLB,,,

## 2024-02-08 NOTE — PROGRESS NOTES
[facility]    2024 9:52 AM    Assessment/Plan:    Patient presents today with therapeutic INR.    Assessment of patient findings and chart review: no changes noted since last INR    Recommendation for patient's warfarin regimen: Continue current maintenance dose    Recommend repeat INR in 2 weeks (will skip testing next week  holiday)  _________________________________________________________________    Bright Spain (83 y.o.) is followed by the [facility].    Anticoagulation Summary  As of 2024      INR goal:  2.0-3.0   TTR:  65.1 % (7.7 y)   INR used for dosin.0 (2024)   Warfarin maintenance plan:  4 mg (4 mg x 1) every Mon; 6 mg (4 mg x 1.5) all other days   Weekly warfarin total:  40 mg   Plan last modified:  Robb Kowalski, PharmD (2024)   Next INR check:  2024   Target end date:      Indications    Anticoagulated with warfarin [Z79.01]  Atrial fibrillation (Resolved) [I48.91]                 Anticoagulation Episode Summary       INR check location:  Home Draw    Preferred lab:  EXTERNAL    Send INR reminders to:  [facility] COUMADIN HOME MONITOR    Comments:  [employer] home meter(Tuesday) /[facility] ph [phone]  fax [phone] // (see 22 note [facility]  [place](call for RESULTS--[phone] Acct # [id number]) // 22 for all meter updated          Anticoagulation Care Providers       Provider Role Specialty Phone number    Janet Ruelas MD [facility] Cardiology [phone]

## 2024-02-21 ENCOUNTER — ANTI-COAG VISIT (OUTPATIENT)
Dept: CARDIOLOGY | Facility: CLINIC | Age: 84
End: 2024-02-21
Payer: MEDICARE

## 2024-02-21 DIAGNOSIS — Z79.01 ANTICOAGULATED WITH WARFARIN: Primary | ICD-10-CM

## 2024-02-21 LAB — INR PPP: 1.9

## 2024-02-21 PROCEDURE — 93793 ANTICOAG MGMT PT WARFARIN: CPT | Mod: S$GLB,,,

## 2024-02-21 NOTE — PROGRESS NOTES
[facility]    2024 8:45 AM    Assessment/Plan:    Patient presents today with subtherapeutic  INR.    Recommendation for patient's warfarin regimen: Continue current regimen & re-assess in 2 weeks.     Recommend repeat INR in 2 weeks  _________________________________________________________________    Bright Spain (83 y.o.) is followed by the [facility].    Anticoagulation Summary  As of 2024      INR goal:  2.0-3.0   TTR:  64.8 % (7.7 y)   INR used for dosin.9 (2024)   Warfarin maintenance plan:  4 mg (4 mg x 1) every Mon; 6 mg (4 mg x 1.5) all other days   Weekly warfarin total:  40 mg   Plan last modified:  Robb Kowalski, PharmD (2024)   Next INR check:  3/5/2024   Target end date:      Indications    Anticoagulated with warfarin [Z79.01]  Atrial fibrillation (Resolved) [I48.91]                 Anticoagulation Episode Summary       INR check location:  Home Draw    Preferred lab:  EXTERNAL    Send INR reminders to:  [facility] COUMADIN HOME MONITOR    Comments:  [employer] home meter(Tuesday) /[facility] ph [phone]  fax [phone] // (see 22 note [facility] -  [place](call for RESULTS--[phone] Acct # [id number]) // 22 for all meter updated          Anticoagulation Care Providers       Provider Role Specialty Phone number    Janet Ruelas MD [facility] Cardiology [phone]

## 2024-03-04 ENCOUNTER — CLINICAL SUPPORT (OUTPATIENT)
Dept: PSYCHIATRY | Facility: CLINIC | Age: 84
End: 2024-03-04
Payer: MEDICARE

## 2024-03-04 VITALS
DIASTOLIC BLOOD PRESSURE: 70 MMHG | HEART RATE: 72 BPM | SYSTOLIC BLOOD PRESSURE: 114 MMHG | BODY MASS INDEX: 21.11 KG/M2 | WEIGHT: 160 LBS

## 2024-03-04 DIAGNOSIS — F03.90 MAJOR NEUROCOGNITIVE DISORDER: Primary | ICD-10-CM

## 2024-03-04 PROCEDURE — 99417 PROLNG OP E/M EACH 15 MIN: CPT | Mod: S$GLB,,, | Performed by: PSYCHIATRY & NEUROLOGY

## 2024-03-04 PROCEDURE — 99215 OFFICE O/P EST HI 40 MIN: CPT | Mod: S$GLB,,, | Performed by: PSYCHIATRY & NEUROLOGY

## 2024-03-04 PROCEDURE — 99999 PR PBB SHADOW E&M-EST. PATIENT-LVL II: CPT | Mod: PBBFAC,,,

## 2024-03-04 RX ORDER — CITALOPRAM 20 MG/1
20 TABLET, FILM COATED ORAL DAILY
Qty: 90 TABLET | Refills: 2 | Status: SHIPPED | OUTPATIENT
Start: 2024-03-04

## 2024-03-04 RX ORDER — MEMANTINE HYDROCHLORIDE 5 MG/1
5 TABLET ORAL 2 TIMES DAILY
Qty: 180 TABLET | Refills: 2 | Status: SHIPPED | OUTPATIENT
Start: 2024-03-04

## 2024-03-04 NOTE — PROGRESS NOTES
STAFF NOTE:  Patient's case was formally presented to me by Dr. Fulton and patient was seen by me in supervision with his family members present.  I agree with the assessment and plan as specified.      Pineda Stephenson MD  Psychiatry Staff   [facility]

## 2024-03-04 NOTE — PROGRESS NOTES
I called patient to inform the referral for a  was placed. Patient proceeded to tell me that the Baclofen is not working and he doubled up on it. I informed patient that he should not be doubling up on medication unless his doctor advises him to change the dose and stated he's already on a lot of medication. Patient informed me that he hasn't been taking his medication, for at least 2 days since his wife was admitted yesterday, he cannot see well to read the bottles and his home health nurse did not put his meds in his pill box for the week. Patient stated he's in bad shape. I noted patient did not sound like he usually does when I've spoken w/him in the past, almost as if he was out of it. I asked him if he would mind holding and I spoke w/Dr. Markie Puckett and Nicolle Branch MA regarding situation. MA noted patient missed his [facility] appt yesterday and due to patient's current state, the decision to call 911 was advised. I informed patient that Dr. Markie Puckett wants patient to go to the hospital and advised that I call 911. Patient was agreeable. I put his call on hold and called 911. I stayed on the phone with patient til the ambulance arrived.     Last seen 6/28/2023  Next appt Visit date not found Outpatient Psychiatry Follow-Up Visit (MD)    3/4/2024    Clinical Status of Patient:  Outpatient (Ambulatory)    Chief Complaint:  Bright Spain is a 83 y.o. male who presents today for follow-up of memory problem and behavior problems.  Met with patient and spouse, granddaughter, and granddaughter's mother in law.    Interval History and Content of Current Session:  Interim Events/Subjective Report/Content of Current Session:  Mr. Spain returned casually dressed and groomed, ambulating via wheelchair.  He was alert with euthymic mood and pleasant affect. He acknowledged occasional memory gaps but he takes his time and works around them.  Neither he nor his wife reported any depression, anxiety, irritability, or behavioral issues. Lives at home with wife, granddaughter helps with medical management and medications.    Medications were reviewed.  Sublingual B12 is needed to prevent recurrence of a low B12 level.  Other psychotropics are well tolerated.  No adverse effects or medication side effects. No changes were requested or recommended.     Psychotherapy:   Target symptoms: depression, anxiety , confusion, memory loss  Why chosen therapy is appropriate versus another modality: relevant to diagnosis, patient responds to this modality, evidence based practice  Outcome monitoring methods: self-report, observation, feedback from family  Therapeutic intervention type: behavior modifying psychotherapy, supportive psychotherapy, psychoeducation with family  Topics discussed/themes:  caregiver issues, building skills sets for symptom management, symptom recognition, life stage transitional issues  The patient's response to the intervention is accepting. The patient's progress toward treatment goals is good.   Duration of intervention: 40 minutes.    Review of Systems   PSYCHIATRIC: Pertinant items are noted in the narrative.  CARDIOVASCULAR: Atrial fibrillation, Cardiomyopathy    Past Medical, Family and Social History:  The patient's past medical, family and social history have been reviewed and updated as appropriate within the electronic medical record - see encounter notes.    Compliance: yes    Side effects: None    Risk Parameters:  Patient reports no suicidal ideation  Patient reports no homicidal ideation  Patient reports no self-injurious behavior  Patient reports no violent behavior    Exam (detailed: at least 9 elements; comprehensive: all 15 elements)   Constitutional  Vitals:  Most recent vital signs, dated less than 90 days prior to this appointment, were reviewed.   Vitals:    03/04/24 0926   BP: 114/70   Pulse: 72   Weight: 72.6 kg (160 lb)        General:  age appropriate, well nourished, casually dressed, seated in wheelchair     Musculoskeletal  Muscle Strength/Tone:  no rigidity, no dyskinesia, no dystonia, no tremor   Gait & Station:  in wheelchair     Psychiatric  Speech:  no latency; no press, spontaneous, non-spontaneous   Mood & Affect:  euthymic  shallow   Thought Process:  concrete   Associations:  intact   Thought Content:  normal, no suicidality, no homicidality, delusions, or paranoia   Insight:  limited awareness of illness   Judgement: impaired due to memory loss   Orientation:  person, place, situation   Memory: Gaps evident   Language: grossly intact   Attention Span & Concentration:  able to focus   Fund of Knowledge:  diminished     Assessment and Diagnosis   Status/Progress: Based on the examination today, the patient's problem(s) is/are improved.  New problems have not been presented today.   Co-morbidities are complicating management of the primary condition.  The working differential for this patient includes Major neurocognitive disorder.     General Impression: Pt continues to respond well to treatment for memory, mood, and behavioral problems related to vascular dementia.      ICD-10-CM ICD-9-CM   1. Major neurocognitive disorder  F03.90 294.20         Intervention/Counseling/Treatment Plan    Medication Management: Continue current medications.  Continue general medical and cardiovascular care, especially anticoagulation therapy of AF.      Return to Clinic: 6 months in Geriatric clinic.    Discussed with patient informed consent including diagnosis, risks and benefits of proposed treatment above vs. alternative treatments vs. no treatment, as well as serious and common side effects of these treatments, and the inherent unpredictability of individual responses to these treatments. The patient expresses understanding of the above and displays the capacity to agree with this current plan. Patient also agrees that, currently, the benefits outweigh the risks and would like to pursue treatment at this time, and had no other questions.     Instructions:  Take all medications as prescribed.    Abstain from recreational drugs and alcohol.  Present to ED or call 911 for SI/HI plan or intent, psychosis, or medical emergency.    Case discussed with Dr. Sachin VIDAL.      Mary Fulton MD  Psychiatry  [facility]

## 2024-03-06 ENCOUNTER — ANTI-COAG VISIT (OUTPATIENT)
Dept: CARDIOLOGY | Facility: CLINIC | Age: 84
End: 2024-03-06
Payer: MEDICARE

## 2024-03-06 DIAGNOSIS — Z79.01 ANTICOAGULATED WITH WARFARIN: Primary | ICD-10-CM

## 2024-03-06 LAB — INR PPP: 2.6

## 2024-03-06 PROCEDURE — 93793 ANTICOAG MGMT PT WARFARIN: CPT | Mod: S$GLB,,,

## 2024-03-08 ENCOUNTER — LAB VISIT (OUTPATIENT)
Dept: LAB | Facility: HOSPITAL | Age: 84
End: 2024-03-08
Attending: INTERNAL MEDICINE
Payer: MEDICARE

## 2024-03-08 DIAGNOSIS — E11.9 TYPE 2 DIABETES MELLITUS WITHOUT COMPLICATIONS: Primary | ICD-10-CM

## 2024-03-08 LAB
ALBUMIN SERPL BCP-MCNC: 3.7 G/DL (ref 3.5–5.2)
ALP SERPL-CCNC: 132 U/L (ref 38–126)
ALT SERPL W/O P-5'-P-CCNC: 13 U/L (ref 10–44)
ANION GAP SERPL CALC-SCNC: 8 MMOL/L (ref 8–16)
AST SERPL-CCNC: 26 U/L (ref 15–46)
BASOPHILS # BLD AUTO: 0.03 K/UL (ref 0–0.2)
BASOPHILS NFR BLD: 0.6 % (ref 0–1.9)
BILIRUB SERPL-MCNC: 0.7 MG/DL (ref 0.1–1)
CALCIUM SERPL-MCNC: 8.8 MG/DL (ref 8.7–10.5)
CHLORIDE SERPL-SCNC: 106 MMOL/L (ref 95–110)
CHOLEST SERPL-MCNC: 117 MG/DL (ref 120–199)
CHOLEST/HDLC SERPL: 3.9 {RATIO} (ref 2–5)
CO2 SERPL-SCNC: 27 MMOL/L (ref 23–29)
CREAT SERPL-MCNC: 1.19 MG/DL (ref 0.5–1.4)
DIFFERENTIAL METHOD BLD: ABNORMAL
EOSINOPHIL # BLD AUTO: 0.1 K/UL (ref 0–0.5)
EOSINOPHIL NFR BLD: 2.7 % (ref 0–8)
ERYTHROCYTE [DISTWIDTH] IN BLOOD BY AUTOMATED COUNT: 13.4 % (ref 11.5–14.5)
EST. GFR  (NO RACE VARIABLE): >60 ML/MIN/1.73 M^2
ESTIMATED AVG GLUCOSE: 120 MG/DL (ref 68–131)
GLUCOSE SERPL-MCNC: 93 MG/DL (ref 70–110)
HBA1C MFR BLD: 5.8 % (ref 4–5.6)
HCT VFR BLD AUTO: 39.1 % (ref 40–54)
HDLC SERPL-MCNC: 30 MG/DL (ref 40–75)
HDLC SERPL: 25.6 % (ref 20–50)
HGB BLD-MCNC: 12.6 G/DL (ref 14–18)
IMM GRANULOCYTES # BLD AUTO: 0.02 K/UL (ref 0–0.04)
IMM GRANULOCYTES NFR BLD AUTO: 0.4 % (ref 0–0.5)
LDLC SERPL CALC-MCNC: 59.4 MG/DL (ref 63–159)
LYMPHOCYTES # BLD AUTO: 1.5 K/UL (ref 1–4.8)
LYMPHOCYTES NFR BLD: 31.1 % (ref 18–48)
MCH RBC QN AUTO: 31.7 PG (ref 27–31)
MCHC RBC AUTO-ENTMCNC: 32.2 G/DL (ref 32–36)
MCV RBC AUTO: 98 FL (ref 82–98)
MONOCYTES # BLD AUTO: 0.6 K/UL (ref 0.3–1)
MONOCYTES NFR BLD: 11.7 % (ref 4–15)
NEUTROPHILS # BLD AUTO: 2.6 K/UL (ref 1.8–7.7)
NEUTROPHILS NFR BLD: 53.5 % (ref 38–73)
NONHDLC SERPL-MCNC: 87 MG/DL
NRBC BLD-RTO: 0 /100 WBC
PLATELET # BLD AUTO: 163 K/UL (ref 150–450)
PMV BLD AUTO: 11.2 FL (ref 9.2–12.9)
POTASSIUM SERPL-SCNC: 3.9 MMOL/L (ref 3.5–5.1)
PROT SERPL-MCNC: 7 G/DL (ref 6–8.4)
RBC # BLD AUTO: 3.98 M/UL (ref 4.6–6.2)
SODIUM SERPL-SCNC: 141 MMOL/L (ref 136–145)
TRIGL SERPL-MCNC: 138 MG/DL (ref 30–150)
TSH SERPL DL<=0.005 MIU/L-ACNC: 2.03 UIU/ML (ref 0.4–4)
UUN UR-MCNC: 14 MG/DL (ref 2–20)
WBC # BLD AUTO: 4.86 K/UL (ref 3.9–12.7)

## 2024-03-08 PROCEDURE — 36415 COLL VENOUS BLD VENIPUNCTURE: CPT | Mod: PN | Performed by: INTERNAL MEDICINE

## 2024-03-08 PROCEDURE — 83036 HEMOGLOBIN GLYCOSYLATED A1C: CPT | Performed by: INTERNAL MEDICINE

## 2024-03-08 PROCEDURE — 85025 COMPLETE CBC W/AUTO DIFF WBC: CPT | Mod: PN | Performed by: INTERNAL MEDICINE

## 2024-03-08 PROCEDURE — 80061 LIPID PANEL: CPT | Performed by: INTERNAL MEDICINE

## 2024-03-08 PROCEDURE — 84443 ASSAY THYROID STIM HORMONE: CPT | Mod: PN | Performed by: INTERNAL MEDICINE

## 2024-03-08 PROCEDURE — 80053 COMPREHEN METABOLIC PANEL: CPT | Mod: PN | Performed by: INTERNAL MEDICINE

## 2024-03-20 ENCOUNTER — ANTI-COAG VISIT (OUTPATIENT)
Dept: CARDIOLOGY | Facility: CLINIC | Age: 84
End: 2024-03-20
Payer: MEDICARE

## 2024-03-20 DIAGNOSIS — Z79.01 ANTICOAGULATED WITH WARFARIN: Primary | ICD-10-CM

## 2024-03-20 LAB — INR PPP: 3.5

## 2024-03-20 PROCEDURE — 93793 ANTICOAG MGMT PT WARFARIN: CPT | Mod: S$GLB,,,

## 2024-03-20 NOTE — PROGRESS NOTES
[facility]    03/20/2024 4:23 PM    Assessment/Plan:    Patient presents today with supratherapeutic INR.    Assessment of patient findings and chart review: Pt's daughter denies any changes.     Recommendation for patient's warfarin regimen: Hold warfarin 3/20 & re-challenge dosing.    Recommend repeat INR in 1 week  _________________________________________________________________    Bright Spain (83 y.o.) is followed by the [facility].    Anticoagulation Summary  As of 3/20/2024      INR goal:  2.0-3.0   TTR:  64.8 % (7.8 y)   INR used for dosing:  3.5 (3/19/2024)   Warfarin maintenance plan:  4 mg (4 mg x 1) every Mon; 6 mg (4 mg x 1.5) all other days   Weekly warfarin total:  40 mg   Plan last modified:  Robb Kowalski, PharmD (1/4/2024)   Next INR check:     Target end date:      Indications    Anticoagulated with warfarin [Z79.01]  Atrial fibrillation (Resolved) [I48.91]                 Anticoagulation Episode Summary       INR check location:  Home Draw    Preferred lab:  EXTERNAL    Send INR reminders to:  [facility] COUMADIN HOME MONITOR    Comments:  [employer] home meter(Tuesday) /[facility] ph [phone]  fax [phone] // (see 7/14/22 note [facility]  [place](call for RESULTS--[phone] Acct # [id number]) // 7/14/22 for all meter updated          Anticoagulation Care Providers       Provider Role Specialty Phone number    Janet Ruelas MD [facility] Cardiology [phone]            Patient Findings       Negatives:  Signs/symptoms of thrombosis, Signs/symptoms of bleeding, Laboratory test error suspected, Change in health, Change in alcohol use, Change in activity, Upcoming invasive procedure, Emergency department visit, Upcoming dental procedure, Missed doses, Extra doses, Change in medications, Change in diet/appetite, Hospital admission, Bruising, Other complaints     Comments:  3/20/24 dose reviewed and no changes reported by pt's granddaughter.

## 2024-03-22 NOTE — PROGRESS NOTES
INR at goal. Medications and chart reviewed. No changes noted to necessitate adjustment of warfarin or follow-up plan. See calendar.         63

## 2024-03-27 ENCOUNTER — ANTI-COAG VISIT (OUTPATIENT)
Dept: CARDIOLOGY | Facility: CLINIC | Age: 84
End: 2024-03-27
Payer: MEDICARE

## 2024-03-27 DIAGNOSIS — Z79.01 ANTICOAGULATED WITH WARFARIN: Primary | ICD-10-CM

## 2024-03-27 LAB — INR PPP: 2.8

## 2024-03-27 PROCEDURE — 93793 ANTICOAG MGMT PT WARFARIN: CPT | Mod: S$GLB,,,

## 2024-04-10 ENCOUNTER — ANTI-COAG VISIT (OUTPATIENT)
Dept: CARDIOLOGY | Facility: CLINIC | Age: 84
End: 2024-04-10
Payer: MEDICARE

## 2024-04-10 DIAGNOSIS — Z79.01 ANTICOAGULATED WITH WARFARIN: Primary | ICD-10-CM

## 2024-04-10 LAB — INR PPP: 2.8

## 2024-04-10 PROCEDURE — 93793 ANTICOAG MGMT PT WARFARIN: CPT | Mod: S$GLB,,,

## 2024-04-24 ENCOUNTER — ANTI-COAG VISIT (OUTPATIENT)
Dept: CARDIOLOGY | Facility: CLINIC | Age: 84
End: 2024-04-24
Payer: MEDICARE

## 2024-04-24 DIAGNOSIS — Z79.01 ANTICOAGULATED WITH WARFARIN: Primary | ICD-10-CM

## 2024-04-24 LAB — INR PPP: 2.3

## 2024-04-24 PROCEDURE — 93793 ANTICOAG MGMT PT WARFARIN: CPT | Mod: S$GLB,,,

## 2024-04-24 NOTE — PROGRESS NOTES
[facility]    2024 9:01 AM    Assessment/Plan:    Patient presents today with therapeutic INR.    Assessment of patient findings and chart review: Reviewed     Recommendation for patient's warfarin regimen: Continue current maintenance dose    Recommend repeat INR in 2 weeks  _________________________________________________________________    Bright Spain (83 y.o.) is followed by the [facility].    Anticoagulation Summary  As of 2024      INR goal:  2.0-3.0   TTR:  65.0% (7.9 y)   INR used for dosin.3 (2024)   Warfarin maintenance plan:  4 mg (4 mg x 1) every Mon; 6 mg (4 mg x 1.5) all other days   Weekly warfarin total:  40 mg   Plan last modified:  Robb Kowalski, PharmD (2024)   Next INR check:  2024   Target end date:      Indications    Anticoagulated with warfarin [Z79.01]  Atrial fibrillation (Resolved) [I48.91]                 Anticoagulation Episode Summary       INR check location:  Home Draw    Preferred lab:  EXTERNAL    Send INR reminders to:  [facility] COUMADIN HOME MONITOR    Comments:  [employer] home meter(Tuesday) /[facility] ph [phone]  fax [phone] // (see 22 note [facility] -  [place](call for RESULTS--[phone] Acct # [id number]) // 22 for all meter updated          Anticoagulation Care Providers       Provider Role Specialty Phone number    Janet Ruelas MD [facility] Cardiology [phone]

## 2024-05-08 ENCOUNTER — ANTI-COAG VISIT (OUTPATIENT)
Dept: CARDIOLOGY | Facility: CLINIC | Age: 84
End: 2024-05-08
Payer: MEDICARE

## 2024-05-08 DIAGNOSIS — Z79.01 ANTICOAGULATED WITH WARFARIN: Primary | ICD-10-CM

## 2024-05-08 LAB — INR PPP: 2

## 2024-05-08 PROCEDURE — 93793 ANTICOAG MGMT PT WARFARIN: CPT | Mod: S$GLB,,,

## 2024-05-22 ENCOUNTER — ANTI-COAG VISIT (OUTPATIENT)
Dept: CARDIOLOGY | Facility: CLINIC | Age: 84
End: 2024-05-22
Payer: MEDICARE

## 2024-05-22 DIAGNOSIS — Z79.01 ANTICOAGULATED WITH WARFARIN: Primary | ICD-10-CM

## 2024-05-22 LAB — INR PPP: 2.4

## 2024-05-22 PROCEDURE — 93793 ANTICOAG MGMT PT WARFARIN: CPT | Mod: S$GLB,,,

## 2024-05-22 NOTE — PROGRESS NOTES
[facility]    2024 8:56 AM    Assessment/Plan:    Patient presents today with therapeutic INR.    Recommendation for patient's warfarin regimen: Continue current maintenance dose    Recommend repeat INR in 2 weeks  ________________________________________________________________Ailin Spain (83 y.o.) is followed by the [facility].    Anticoagulation Summary  As of 2024      INR goal:  2.0-3.0   TTR:  65.4% (7.9 y)   INR used for dosin.4 (2024)   Warfarin maintenance plan:  4 mg (4 mg x 1) every Mon; 6 mg (4 mg x 1.5) all other days   Weekly warfarin total:  40 mg   Plan last modified:  Robb Kowalski, PharmD (2024)   Next INR check:  2024   Target end date:      Indications    Anticoagulated with warfarin [Z79.01]  Atrial fibrillation (Resolved) [I48.91]                 Anticoagulation Episode Summary       INR check location:  Home Draw    Preferred lab:  EXTERNAL    Send INR reminders to:  [facility] COUMADIN HOME MONITOR    Comments:  [facility] home meter(Tuesday) /[facility] ph [phone]  fax [phone] // (see 22 note [facility] -  [place](call for RESULTS--[phone] Acct # [id number]) // 22 for all meter updated          Anticoagulation Care Providers       Provider Role Specialty Phone number    Janet Ruelas MD Responsible Cardiology [phone]

## 2024-06-05 ENCOUNTER — ANTI-COAG VISIT (OUTPATIENT)
Dept: CARDIOLOGY | Facility: CLINIC | Age: 84
End: 2024-06-05
Payer: MEDICARE

## 2024-06-05 DIAGNOSIS — Z79.01 ANTICOAGULATED WITH WARFARIN: Primary | ICD-10-CM

## 2024-06-05 LAB — INR PPP: 2.6

## 2024-06-05 PROCEDURE — 93793 ANTICOAG MGMT PT WARFARIN: CPT | Mod: S$GLB,,,

## 2024-06-05 NOTE — PROGRESS NOTES
[facility]    2024 9:07 AM    Assessment/Plan:    Patient presents today with therapeutic INR.    Assessment of patient findings and chart review: Reviewed     Recommendation for patient's warfarin regimen: Continue current maintenance dose    Recommend repeat INR in 2 weeks  _________________________________________________________________    Bright Spain (83 y.o.) is followed by the [facility]tico[facility].    Anticoagulation Summary  As of 2024      INR goal:  2.0-3.0   TTR:  65.5% (8 y)   INR used for dosin.6 (2024)   Warfarin maintenance plan:  4 mg (4 mg x 1) every Mon; 6 mg (4 mg x 1.5) all other days   Weekly warfarin total:  40 mg   Plan last modified:  Robb Kowalski, PharmD (2024)   Next INR check:  2024   Target end date:      Indications    Anticoagulated with warfarin [Z79.01]  Atrial fibrillation (Resolved) [I48.91]                 Anticoagulation Episode Summary       INR check location:  Home Draw    Preferred lab:  EXTERNAL    Send INR reminders to:  [facility] COUMADIN HOME MONITOR    Comments:  [facility] home meter(Tuesday) /[facility] ph [phone]  fax [phone] // (see 22 note [facility] -  [place](call for RESULTS--[phone] Acct # [id number]) // 22 for all meter updated          Anticoagulation Care Providers       Provider Role Specialty Phone number    Janet Ruelas MD Responsible Cardiology [phone]

## 2024-06-19 ENCOUNTER — ANTI-COAG VISIT (OUTPATIENT)
Dept: CARDIOLOGY | Facility: CLINIC | Age: 84
End: 2024-06-19
Payer: MEDICARE

## 2024-06-19 DIAGNOSIS — Z79.01 ANTICOAGULATED WITH WARFARIN: Primary | ICD-10-CM

## 2024-06-19 LAB — INR PPP: 3.9

## 2024-06-19 PROCEDURE — 93793 ANTICOAG MGMT PT WARFARIN: CPT | Mod: S$GLB,,,

## 2024-06-19 NOTE — PROGRESS NOTES
[facility]    06/19/2024 2:08 PM    Assessment/Plan:    Patient presents today with supratherapeutic INR.    Assessment of patient findings and chart review: Likely due to increase in EtOH intake.     Recommendation for patient's warfarin regimen: Hold dose today then resume current maintenance dose    Recommend repeat INR in 2 weeks  _________________________________________________________________    Bright Spain (83 y.o.) is followed by the [facility].    Anticoagulation Summary  As of 6/19/2024      INR goal:  2.0-3.0   TTR:  65.4% (8 y)   INR used for dosing:  3.9 (6/18/2024)   Warfarin maintenance plan:  4 mg (4 mg x 1) every Mon; 6 mg (4 mg x 1.5) all other days   Weekly warfarin total:  40 mg   Plan last modified:  Robb Kowalski, PharmD (1/4/2024)   Next INR check:  7/2/2024   Target end date:      Indications    Anticoagulated with warfarin [Z79.01]  Atrial fibrillation (Resolved) [I48.91]                 Anticoagulation Episode Summary       INR check location:  Home Draw    Preferred lab:  EXTERNAL    Send INR reminders to:  [facility] COUMADIN HOME MONITOR    Comments:  [employer] home meter(Tuesday) /[facility] ph [phone]  fax [phone] // (see 7/14/22 note [facility] -  [place](call for RESULTS--[phone] Acct # [id number]) // 7/14/22 for all meter updated          Anticoagulation Care Providers       Provider Role Specialty Phone number    Janet Ruelas MD [facility] Cardiology [phone]

## 2024-07-03 ENCOUNTER — ANTI-COAG VISIT (OUTPATIENT)
Dept: CARDIOLOGY | Facility: CLINIC | Age: 84
End: 2024-07-03
Payer: MEDICARE

## 2024-07-03 DIAGNOSIS — Z79.01 ANTICOAGULATED WITH WARFARIN: Primary | ICD-10-CM

## 2024-07-03 LAB — INR PPP: 2.6

## 2024-07-03 PROCEDURE — 93793 ANTICOAG MGMT PT WARFARIN: CPT | Mod: S$GLB,,,

## 2024-07-03 NOTE — PROGRESS NOTES
[facility]    2024 10:50 AM    Assessment/Plan:    Patient presents today with therapeutic INR.    Assessment of patient findings and chart review: Reviewed     Recommendation for patient's warfarin regimen: Continue current maintenance dose    Recommend repeat INR in 2 weeks  _________________________________________________________________    Bright Spain (83 y.o.) is followed by the [facility] [facility] [facility].    Anticoagulation Summary  As of 7/3/2024      INR goal:  2.0-3.0   TTR:  65.2% (8.1 y)   INR used for dosin.6 (2024)   Warfarin maintenance plan:  4 mg (4 mg x 1) every Mon; 6 mg (4 mg x 1.5) all other days   Weekly warfarin total:  40 mg   Plan last modified:  Robb Kowalski, PharmD (2024)   Next INR check:  2024   Target end date:      Indications    Anticoagulated with warfarin [Z79.01]  Atrial fibrillation (Resolved) [I48.91]                 Anticoagulation Episode Summary       INR check location:  Home Draw    Preferred lab:  EXTERNAL    Send INR reminders to:  [facility] COUMADIN HOME MONITOR    Comments:  [facility] home meter(Tuesday) /[facility] ph [phone]  fax [phone] // (see 22 note [facility] -  [place](call for RESULTS--[phone] Acct # [id number]) // 22 for all meter updated          Anticoagulation Care Providers       Provider Role Specialty Phone number    Janet Ruelas MD [facility] Cardiology [phone]

## 2024-07-17 LAB — INR PPP: 2.1

## 2024-07-18 ENCOUNTER — ANTI-COAG VISIT (OUTPATIENT)
Dept: CARDIOLOGY | Facility: CLINIC | Age: 84
End: 2024-07-18
Payer: MEDICARE

## 2024-07-18 DIAGNOSIS — Z79.01 ANTICOAGULATED WITH WARFARIN: Primary | ICD-10-CM

## 2024-07-18 PROCEDURE — 93793 ANTICOAG MGMT PT WARFARIN: CPT | Mod: S$GLB,,,

## 2024-07-18 NOTE — PROGRESS NOTES
[facility]    2024 10:17 AM    Assessment/Plan:    Patient presents today with therapeutic INR.    Assessment of patient findings and chart review: Reviewed     Recommendation for patient's warfarin regimen: Continue current maintenance dose    Recommend repeat INR in 2 weeks  _________________________________________________________________    Bright Spain (83 y.o.) is followed by the [employer] [facility].    Anticoagulation Summary  As of 2024      INR goal:  2.0-3.0   TTR:  65.4% (8.1 y)   INR used for dosin.1 (2024)   Warfarin maintenance plan:  4 mg (4 mg x 1) every Mon; 6 mg (4 mg x 1.5) all other days   Weekly warfarin total:  40 mg   Plan last modified:  Robb Kowalski, PharmD (2024)   Next INR check:  2024   Target end date:      Indications    Anticoagulated with warfarin [Z79.01]  Atrial fibrillation (Resolved) [I48.91]                 Anticoagulation Episode Summary       INR check location:  Home Draw    Preferred lab:  EXTERNAL    Send INR reminders to:  [facility] COUMADIN HOME MONITOR    Comments:  [facility] home meter(Tuesday) /[facility] ph [phone]  fax [phone] // (see 22 note [facility] -  [place](call for RESULTS--[phone] Acct # [id number]) // 22 for all meter updated          Anticoagulation Care Providers       Provider Role Specialty Phone number    Janet Ruelas MD [facility] Cardiology [phone]

## 2024-08-01 ENCOUNTER — ANTI-COAG VISIT (OUTPATIENT)
Dept: CARDIOLOGY | Facility: CLINIC | Age: 84
End: 2024-08-01
Payer: MEDICARE

## 2024-08-01 DIAGNOSIS — Z79.01 ANTICOAGULATED WITH WARFARIN: Primary | ICD-10-CM

## 2024-08-01 LAB — INR PPP: 3.3

## 2024-08-01 PROCEDURE — 93793 ANTICOAG MGMT PT WARFARIN: CPT | Mod: S$GLB,,,

## 2024-08-01 NOTE — PROGRESS NOTES
[facility]    08/01/2024 9:48 AM    Assessment/Plan:    Patient presents today with supratherapeutic INR.    Assessment of patient findings and chart review: Supratherapeutic likely due to increase in alcohol intake.     Recommendation for patient's warfarin regimen: Lower dose today to 4mg then resume current maintenance dose    Recommend repeat INR in 2 weeks  _________________________________________________________________    Bright Spain (83 y.o.) is followed by the [employer] [facility].    Anticoagulation Summary  As of 8/1/2024      INR goal:  2.0-3.0   TTR:  65.4% (8.1 y)   INR used for dosing:  3.3 (7/31/2024)   Warfarin maintenance plan:  4 mg (4 mg x 1) every Mon; 6 mg (4 mg x 1.5) all other days   Weekly warfarin total:  40 mg   Plan last modified:  Robb Kowalski, PharmD (1/4/2024)   Next INR check:     Target end date:      Indications    Anticoagulated with warfarin [Z79.01]  Atrial fibrillation (Resolved) [I48.91]                 Anticoagulation Episode Summary       INR check location:  Home Draw    Preferred lab:  EXTERNAL    Send INR reminders to:  [facility] COUMADIN HOME MONITOR    Comments:  [employer] home meter(Tuesday) /[facility] ph [phone]  fax [phone] // (see 7/14/22 note [facility]  [place](call for RESULTS--[phone] Acct # [id number]) // 7/14/22 for all meter updated          Anticoagulation Care Providers       Provider Role Specialty Phone number    Janet Ruelas MD [facility] Cardiology [phone]

## 2024-08-14 ENCOUNTER — ANTI-COAG VISIT (OUTPATIENT)
Dept: CARDIOLOGY | Facility: CLINIC | Age: 84
End: 2024-08-14
Payer: MEDICARE

## 2024-08-14 DIAGNOSIS — Z79.01 ANTICOAGULATED WITH WARFARIN: Primary | ICD-10-CM

## 2024-08-14 LAB — INR PPP: 3.1

## 2024-08-14 PROCEDURE — 93793 ANTICOAG MGMT PT WARFARIN: CPT | Mod: S$GLB,,,

## 2024-08-14 NOTE — PROGRESS NOTES
[facility]    08/14/2024 4:17 PM    Assessment/Plan:    Patient presents today with supratherapeutic INR.    Assessment of patient findings and chart review: Pt continues to hover above therapeutic range after re-challenging dose     Recommendation for patient's warfarin regimen: Decrease maintenance dose    Recommend repeat INR in 1 week  _________________________________________________________________    B[place] (83 y.o.) is followed by the [facility].    Anticoagulation Summary  As of 8/14/2024      INR goal:  2.0-3.0   TTR:  65.1% (8.2 y)   INR used for dosing:  3.1 (8/14/2024)   Warfarin maintenance plan:  4 mg (4 mg x 1) every Mon, Fri; 6 mg (4 mg x 1.5) all other days   Weekly warfarin total:  38 mg   Plan last modified:  Robb Kowalski, PharmD (8/14/2024)   Next INR check:  8/21/2024   Target end date:      Indications    Anticoagulated with warfarin [Z79.01]  Atrial fibrillation (Resolved) [I48.91]                 Anticoagulation Episode Summary       INR check location:  Home Draw    Preferred lab:  EXTERNAL    Send INR reminders to:  [facility] COUMADIN HOME MONITOR    Comments:  [employer] home meter(Tuesday) /[facility] ph [phone]  fax [phone] // (see 7/14/22 note [facility]  [place](call for RESULTS--[phone] Acct # [id number]) // 7/14/22 for all meter updated          Anticoagulation Care Providers       Provider Role Specialty Phone number    Janet Ruelas MD [facility] Cardiology [phone]

## 2024-08-22 ENCOUNTER — ANTI-COAG VISIT (OUTPATIENT)
Dept: CARDIOLOGY | Facility: CLINIC | Age: 84
End: 2024-08-22
Payer: MEDICARE

## 2024-08-22 DIAGNOSIS — Z79.01 ANTICOAGULATED WITH WARFARIN: Primary | ICD-10-CM

## 2024-08-22 LAB — INR PPP: 2.5

## 2024-08-22 PROCEDURE — 93793 ANTICOAG MGMT PT WARFARIN: CPT | Mod: S$GLB,,,

## 2024-08-22 NOTE — PROGRESS NOTES
[facility]    2024 9:51 AM    Assessment/Plan:    Patient presents today with therapeutic INR.    Assessment of patient findings and chart review: Reviewed     Recommendation for patient's warfarin regimen: Continue current maintenance dose    Recommend repeat INR in 2 weeks  _________________________________________________________________    [place] (84 y.o.) is followed by the [facility].    Anticoagulation Summary  As of 2024      INR goal:  2.0-3.0   TTR:  65.2% (8.2 y)   INR used for dosin.5 (2024)   Warfarin maintenance plan:  4 mg (4 mg x 1) every Mon, Fri; 6 mg (4 mg x 1.5) all other days   Weekly warfarin total:  38 mg   Plan last modified:  Robb Kowalski, PharmD (2024)   Next INR check:  2024   Target end date:      Indications    Anticoagulated with warfarin [Z79.01]  Atrial fibrillation (Resolved) [I48.91]                 Anticoagulation Episode Summary       INR check location:  Home Draw    Preferred lab:  EXTERNAL    Send INR reminders to:  [facility] COUMADIN HOME MONITOR    Comments:  [employer] home meter(Tuesday) /[facility] ph [phone]  fax [phone] // (see 22 note [facility]  [place](call for RESULTS--[phone] Acct # [id number]) // 22 for all meter updated          Anticoagulation Care Providers       Provider Role Specialty Phone number    Janet Ruelas MD [facility] Cardiology [phone]

## 2024-09-05 ENCOUNTER — ANTI-COAG VISIT (OUTPATIENT)
Dept: CARDIOLOGY | Facility: CLINIC | Age: 84
End: 2024-09-05
Payer: MEDICARE

## 2024-09-05 DIAGNOSIS — Z79.01 ANTICOAGULATED WITH WARFARIN: Primary | ICD-10-CM

## 2024-09-05 LAB — INR PPP: 2.1

## 2024-09-05 PROCEDURE — 93793 ANTICOAG MGMT PT WARFARIN: CPT | Mod: S$GLB,,,

## 2024-09-05 NOTE — PROGRESS NOTES
[facility]    2024 10:19 AM    Assessment/Plan:    Patient presents today with therapeutic INR.    Assessment of patient findings and chart review: Reviewed     Recommendation for patient's warfarin regimen: Continue current maintenance dose    Recommend repeat INR in 2 weeks  _________________________________________________________________    [place] (84 y.o.) is followed by the [facility].    Anticoagulation Summary  As of 2024      INR goal:  2.0-3.0   TTR:  65.3% (8.2 y)   INR used for dosin.1 (2024)   Warfarin maintenance plan:  4 mg (4 mg x 1) every Mon, Fri; 6 mg (4 mg x 1.5) all other days   Weekly warfarin total:  38 mg   Plan last modified:  Robb Kowalski, PharmD (2024)   Next INR check:  2024   Target end date:      Indications    Anticoagulated with warfarin [Z79.01]  Atrial fibrillation (Resolved) [I48.91]                 Anticoagulation Episode Summary       INR check location:  Home Draw    Preferred lab:  EXTERNAL    Send INR reminders to:  [facility] COUMADIN HOME MONITOR    Comments:  [employer] home meter(Tuesday) /[facility] ph [phone]  fax [phone] // (see 22 note [facility] -  [place](call for RESULTS--[phone] Acct # [id number]) // 22 for all meter updated          Anticoagulation Care Providers       Provider Role Specialty Phone number    Janet Ruelas MD [facility] Cardiology [phone]

## 2024-09-19 ENCOUNTER — ANTI-COAG VISIT (OUTPATIENT)
Dept: CARDIOLOGY | Facility: CLINIC | Age: 84
End: 2024-09-19
Payer: MEDICARE

## 2024-09-19 DIAGNOSIS — Z79.01 ANTICOAGULATED WITH WARFARIN: Primary | ICD-10-CM

## 2024-09-19 LAB — INR PPP: 3

## 2024-09-19 PROCEDURE — 93793 ANTICOAG MGMT PT WARFARIN: CPT | Mod: S$GLB,,,

## 2024-09-19 NOTE — PROGRESS NOTES
[facility]    09/19/2024 8:47 AM    Assessment/Plan:    Patient presents today with therapeutic INR.    Assessment of patient findings and chart review: Reviewed     Recommendation for patient's warfarin regimen: Continue current maintenance dose    Recommend repeat INR in 2 weeks  _________________________________________________________________    [place] (84 y.o.) is followed by the [facility].    Anticoagulation Summary  As of 9/19/2024      INR goal:  2.0-3.0   TTR:  65.5% (8.3 y)   INR used for dosing:  3.0 (9/18/2024)   Warfarin maintenance plan:  4 mg (4 mg x 1) every Mon, Fri; 6 mg (4 mg x 1.5) all other days   Weekly warfarin total:  38 mg   Plan last modified:  Robb Kowalski, PharmD (8/14/2024)   Next INR check:  10/2/2024   Target end date:      Indications    Anticoagulated with warfarin [Z79.01]  Atrial fibrillation (Resolved) [I48.91]                 Anticoagulation Episode Summary       INR check location:  Home Draw    Preferred lab:  EXTERNAL    Send INR reminders to:  [facility] COUMADIN HOME MONITOR    Comments:  [employer] home meter(Tuesday) /[facility] ph [phone]  fax [phone] // (see 7/14/22 note [facility] -  [place](call for RESULTS--[phone] Acct # [id number]) // 7/14/22 for all meter updated          Anticoagulation Care Providers       Provider Role Specialty Phone number    Janet Ruelas MD [facility] Cardiology [phone]

## 2024-10-03 ENCOUNTER — ANTI-COAG VISIT (OUTPATIENT)
Dept: CARDIOLOGY | Facility: CLINIC | Age: 84
End: 2024-10-03
Payer: MEDICARE

## 2024-10-03 DIAGNOSIS — Z79.01 ANTICOAGULATED WITH WARFARIN: Primary | ICD-10-CM

## 2024-10-03 LAB — INR PPP: 2.8

## 2024-10-03 PROCEDURE — 93793 ANTICOAG MGMT PT WARFARIN: CPT | Mod: S$GLB,,,

## 2024-10-03 NOTE — PROGRESS NOTES
[facility]    10/03/2024 8:51 AM    Assessment/Plan:    Patient presents today with therapeutic INR.    Assessment of patient findings and chart review: Reviewed     Recommendation for patient's warfarin regimen: Continue current maintenance dose    Recommend repeat INR in 2 weeks  _________________________________________________________________    [place] (84 y.o.) is followed by the [employer] [facility].    Anticoagulation Summary  As of 10/3/2024      INR goal:  2.0-3.0   TTR:  65.6% (8.3 y)   INR used for dosin.8 (10/2/2024)   Warfarin maintenance plan:  4 mg (4 mg x 1) every Mon, Fri; 6 mg (4 mg x 1.5) all other days   Weekly warfarin total:  38 mg   Plan last modified:  Robb Kowalski, PharmD (2024)   Next INR check:  10/16/2024   Target end date:  --    Indications    Anticoagulated with warfarin [Z79.01]  Atrial fibrillation (Resolved) [I48.91]                 Anticoagulation Episode Summary       INR check location:  Home Draw    Preferred lab:  EXTERNAL    Send INR reminders to:  [facility] COUMADIN HOME MONITOR    Comments:  [employer] home meter(Tuesday) /[facility] ph [phone]  fax [phone] // (see 22 note [facility] -  [place](call for RESULTS--[phone] Acct # [id number]) // 22 for all meter updated          Anticoagulation Care Providers       Provider Role Specialty Phone number    Janet Ruelas MD [facility] Cardiology [phone]

## 2024-10-16 LAB — INR PPP: 2.8

## 2024-10-17 ENCOUNTER — ANTI-COAG VISIT (OUTPATIENT)
Dept: CARDIOLOGY | Facility: CLINIC | Age: 84
End: 2024-10-17
Payer: MEDICARE

## 2024-10-17 DIAGNOSIS — Z79.01 ANTICOAGULATED WITH WARFARIN: Primary | ICD-10-CM

## 2024-10-17 PROCEDURE — 93793 ANTICOAG MGMT PT WARFARIN: CPT | Mod: S$GLB,,,

## 2024-10-17 NOTE — PROGRESS NOTES
[facility]    10/17/2024 11:10 AM    Assessment/Plan:    Patient presents today with therapeutic INR.    Assessment of patient findings and chart review: Reviewed     Recommendation for patient's warfarin regimen: Continue current maintenance dose    Recommend repeat INR in 2 weeks  _________________________________________________________________    [place] (84 y.o.) is followed by the [facility].    Anticoagulation Summary  As of 10/17/2024      INR goal:  2.0-3.0   TTR:  65.8% (8.4 y)   INR used for dosin.8 (10/16/2024)   Warfarin maintenance plan:  4 mg (4 mg x 1) every Mon, Fri; 6 mg (4 mg x 1.5) all other days   Weekly warfarin total:  38 mg   Plan last modified:  Robb Kowalski, PharmD (2024)   Next INR check:  10/30/2024   Target end date:  --    Indications    Anticoagulated with warfarin [Z79.01]  Atrial fibrillation (Resolved) [I48.91]                 Anticoagulation Episode Summary       INR check location:  Home Draw    Preferred lab:  EXTERNAL    Send INR reminders to:  [facility] COUMADIN HOME MONITOR    Comments:  [employer] home meter(Tuesday) /[facility] ph [phone]  fax [phone] // (see 22 note [facility] -  [place](call for RESULTS--[phone] Acct # [id number]) // 22 for all meter updated          Anticoagulation Care Providers       Provider Role Specialty Phone number    Janet Ruelas MD [facility] Cardiology [phone]

## 2024-10-31 ENCOUNTER — ANTI-COAG VISIT (OUTPATIENT)
Dept: CARDIOLOGY | Facility: CLINIC | Age: 84
End: 2024-10-31
Payer: MEDICARE

## 2024-10-31 DIAGNOSIS — Z79.01 ANTICOAGULATED WITH WARFARIN: Primary | ICD-10-CM

## 2024-10-31 LAB — INR PPP: 2.9

## 2024-10-31 PROCEDURE — 93793 ANTICOAG MGMT PT WARFARIN: CPT | Mod: S$GLB,,,

## 2024-11-13 LAB — INR PPP: 2.2

## 2024-11-14 ENCOUNTER — ANTI-COAG VISIT (OUTPATIENT)
Dept: CARDIOLOGY | Facility: CLINIC | Age: 84
End: 2024-11-14
Payer: MEDICARE

## 2024-11-14 DIAGNOSIS — Z79.01 ANTICOAGULATED WITH WARFARIN: Primary | ICD-10-CM

## 2024-11-14 PROCEDURE — 93793 ANTICOAG MGMT PT WARFARIN: CPT | Mod: S$GLB,,,

## 2024-11-26 LAB — INR PPP: 1.8

## 2024-11-27 ENCOUNTER — ANTI-COAG VISIT (OUTPATIENT)
Dept: CARDIOLOGY | Facility: CLINIC | Age: 84
End: 2024-11-27
Payer: MEDICARE

## 2024-11-27 DIAGNOSIS — Z79.01 ANTICOAGULATED WITH WARFARIN: Primary | ICD-10-CM

## 2024-11-27 PROCEDURE — 93793 ANTICOAG MGMT PT WARFARIN: CPT | Mod: S$GLB,,,

## 2024-11-27 NOTE — PROGRESS NOTES
[facility]    2024 8:47 AM    Assessment/Plan:    Patient presents today with subtherapeutic  INR.    Assessment of patient findings and chart review: Reviewed     Recommendation for patient's warfarin regimen: Boost dose today to 8mg then resume current maintenance dose    Recommend repeat INR in 2 weeks  _________________________________________________________________    [place] (84 y.o.) is followed by the [employer] [facility].    Anticoagulation Summary  As of 2024      INR goal:  2.0-3.0   TTR:  66.0% (8.5 y)   INR used for dosin.8 (2024)   Warfarin maintenance plan:  4 mg (4 mg x 1) every Mon, Fri; 6 mg (4 mg x 1.5) all other days   Weekly warfarin total:  38 mg   Plan last modified:  Robb Kowalski, PharmD (2024)   Next INR check:  2024   Target end date:  --    Indications    Anticoagulated with warfarin [Z79.01]  Atrial fibrillation (Resolved) [I48.91]                 Anticoagulation Episode Summary       INR check location:  Home Draw    Preferred lab:  EXTERNAL    Send INR reminders to:  [facility] COUMADIN HOME MONITOR    Comments:  [employer] home meter(Tuesday) /[facility] ph [phone]  fax [phone] // (see 22 note [facility] -  [place](call for RESULTS--[phone] Acct # [id number]) // 22 for all meter updated          Anticoagulation Care Providers       Provider Role Specialty Phone number    Janet Ruelas MD [facility] Cardiology [phone]

## 2024-12-11 LAB — INR PPP: 2.2

## 2024-12-12 ENCOUNTER — ANTI-COAG VISIT (OUTPATIENT)
Dept: CARDIOLOGY | Facility: CLINIC | Age: 84
End: 2024-12-12
Payer: MEDICARE

## 2024-12-12 DIAGNOSIS — Z79.01 ANTICOAGULATED WITH WARFARIN: Primary | ICD-10-CM

## 2024-12-12 PROCEDURE — 93793 ANTICOAG MGMT PT WARFARIN: CPT | Mod: S$GLB,,,

## 2024-12-12 NOTE — PROGRESS NOTES
[facility]    2024 9:51 AM    Assessment/Plan:    Patient presents today with therapeutic INR.    Assessment of patient findings and chart review: Reviewed    Recommendation for patient's warfarin regimen: Continue current maintenance dose    Recommend repeat INR in 3 weeks  _________________________________________________________________    [place] (84 y.o.) is followed by the [employer] [facility].    Anticoagulation Summary  As of 2024      INR goal:  2.0-3.0   TTR:  66.0% (8.5 y)   INR used for dosin.2 (2024)   Warfarin maintenance plan:  4 mg (4 mg x 1) every Mon, Fri; 6 mg (4 mg x 1.5) all other days   Weekly warfarin total:  38 mg   Plan last modified:  Robb Kowalski, PharmD (2024)   Next INR check:  2024   Target end date:  --    Indications    Anticoagulated with warfarin [Z79.01]  Atrial fibrillation (Resolved) [I48.91]                 Anticoagulation Episode Summary       INR check location:  Home Draw    Preferred lab:  EXTERNAL    Send INR reminders to:  [facility] COUMADIN HOME MONITOR    Comments:  [employer] home meter(Tuesday) /[facility] ph [phone]  fax [phone] // (see 22 note [facility] -  [place](call for RESULTS--[phone] Acct # [id number]) // 22 for all meter updated          Anticoagulation Care Providers       Provider Role Specialty Phone number    Janet Ruelas MD [facility] Cardiology [phone]

## 2024-12-16 NOTE — ED NOTES
COLONOSCOPY  DIET:  Resume your usual diet.    ACTIVITY:  Rest quietly at home for the remainder of the day. You may resume normal activities tomorrow.  Do not do anything that requires coordination the day of the procedure, such as climbing ladders, using a sharp knife or operating machiner  Do not drive until tomorrow morning.                       Resume normal medications.     WHAT TO EXPECT:  Mild abdominal discomfort, bloating and gas pains.   A scant amount of rectal bleeding following a biopsy, polypectomy or if you have hemorrhoids, is normal.  Return of your usual bowel movements in 1-2 days.  If  you had a polyp removed or biopsy performed, these specimens will be sent to the pathology laboratory.        Patient/Family given For your Well Being Teaching Sheet:  __X_ Care After Anesthesia/ Sedation  __X_ Pain Management Tips     PROBLEMS TO WATCH FOR--NOTIFY YOUR SURGEON IF YOU HAVE ANY OF THESE SYMPTOMS:  Severe abdominal pain or bloating.   Chills or temperature over 101 degrees.  Large amount of bright red rectal bleeding, blood clots or black colored stool.  Vomiting blood or black colored liquid.         Recommendations:  Awaiting pathology results due to biopsy(s) performed., If polyp is adenomatous, patient will need a repeat colonoscopy in 3 year(s)., Start benifiber daily., and Cross-sectional imaging over the next few weeks should be interpreted with caution as large polyp removed from Ascending colon and submucosal injection was done    If you have any problems or questions concerning your surgical procedure, please do not hesitate to call your doctor's office or the day surgery dept [phone] (mon - fri)   86-88% RA WITH MARCHING IN PLACE. MD ADVISED.

## 2024-12-30 LAB — INR PPP: 2.9

## 2024-12-31 ENCOUNTER — ANTI-COAG VISIT (OUTPATIENT)
Dept: CARDIOLOGY | Facility: CLINIC | Age: 84
End: 2024-12-31
Payer: MEDICARE

## 2024-12-31 DIAGNOSIS — Z79.01 ANTICOAGULATED WITH WARFARIN: Primary | ICD-10-CM

## 2024-12-31 PROCEDURE — 93793 ANTICOAG MGMT PT WARFARIN: CPT | Mod: S$GLB,,,

## 2024-12-31 NOTE — PROGRESS NOTES
[facility]    2024 10:09 AM    Assessment/Plan:    Patient presents today with therapeutic INR.    Assessment of patient findings and chart review: Reviewed    Recommendation for patient's warfarin regimen: Continue current maintenance dose    Recommend repeat INR in 2 weeks  _________________________________________________________________    [place] (84 y.o.) is followed by the [facility].    Anticoagulation Summary  As of 2024      INR goal:  2.0-3.0   TTR:  66.2% (8.6 y)   INR used for dosin.9 (2024)   Warfarin maintenance plan:  4 mg (4 mg x 1) every Mon, Fri; 6 mg (4 mg x 1.5) all other days   Weekly warfarin total:  38 mg   Plan last modified:  Robb Kowalski, PharmD (2024)   Next INR check:  2025   Target end date:  --    Indications    Anticoagulated with warfarin [Z79.01]  Atrial fibrillation (Resolved) [I48.91]                 Anticoagulation Episode Summary       INR check location:  Home Draw    Preferred lab:  EXTERNAL    Send INR reminders to:  [facility] COUMADIN HOME MONITOR    Comments:  [employer] home meter(Tuesday) /[facility] ph [phone]  fax [phone] // (see 22 note [facility] -  [place](call for RESULTS--[phone] Acct # [id number]) // 22 for all meter updated          Anticoagulation Care Providers       Provider Role Specialty Phone number    Janet Ruelas MD [facility] Cardiology [phone]

## 2025-01-14 LAB — INR PPP: 2.8

## 2025-01-15 ENCOUNTER — ANTI-COAG VISIT (OUTPATIENT)
Dept: CARDIOLOGY | Facility: CLINIC | Age: 85
End: 2025-01-15
Payer: MEDICARE

## 2025-01-15 DIAGNOSIS — Z79.01 ANTICOAGULATED WITH WARFARIN: Primary | ICD-10-CM

## 2025-01-15 PROCEDURE — 93793 ANTICOAG MGMT PT WARFARIN: CPT | Mod: S$GLB,,,

## 2025-01-15 NOTE — PROGRESS NOTES
[facility]    01/15/2025 10:34 AM    Assessment/Plan:    Patient presents today with therapeutic INR.    Assessment of patient findings and chart review: Reviewed    Recommendation for patient's warfarin regimen: Continue current maintenance dose    Recommend repeat INR in 2 weeks  _________________________________________________________________    [place] (84 y.o.) is followed by the [employer] [facility].    Anticoagulation Summary  As of 1/15/2025      INR goal:  2.0-3.0   TTR:  66.3% (8.6 y)   INR used for dosin.8 (2025)   Warfarin maintenance plan:  4 mg (4 mg x 1) every Mon, Fri; 6 mg (4 mg x 1.5) all other days   Weekly warfarin total:  38 mg   Plan last modified:  Robb Kowalski, PharmD (2024)   Next INR check:  2025   Target end date:  --    Indications    Anticoagulated with warfarin [Z79.01]  Atrial fibrillation (Resolved) [I48.91]                 Anticoagulation Episode Summary       INR check location:  Home Draw    Preferred lab:  EXTERNAL    Send INR reminders to:  [facility] COUMADIN HOME MONITOR    Comments:  [employer] home meter(Tuesday) /[facility] ph [phone]  fax [phone] // (see 22 note [facility] -  [place](call for RESULTS--[phone] Acct # [id number]) // 22 for all meter updated          Anticoagulation Care Providers       Provider Role Specialty Phone number    Janet Ruelas MD [facility] Cardiology [phone]

## 2025-01-31 NOTE — PROGRESS NOTES
1/31- Pt's granddaughter reported the pt had to have new strips ordered, so they will test next week when they come in.

## 2025-02-06 ENCOUNTER — ANTI-COAG VISIT (OUTPATIENT)
Dept: CARDIOLOGY | Facility: CLINIC | Age: 85
End: 2025-02-06
Payer: MEDICARE

## 2025-02-06 DIAGNOSIS — Z79.01 ANTICOAGULATED WITH WARFARIN: Primary | ICD-10-CM

## 2025-02-06 LAB — INR PPP: 2.3

## 2025-02-06 PROCEDURE — 93793 ANTICOAG MGMT PT WARFARIN: CPT | Mod: S$GLB,,,

## 2025-02-07 NOTE — PROGRESS NOTES
[facility]    2025 8:49 AM    Assessment/Plan:    Patient presents today with therapeutic INR.    Assessment of patient findings and chart review: Reviewed    Recommendation for patient's warfarin regimen: Continue current maintenance dose    Recommend repeat INR in 2 weeks  _________________________________________________________________    [place] (84 y.o.) is followed by the [employer] [facility].    Anticoagulation Summary  As of 2025      INR goal:  2.0-3.0   TTR:  66.6% (8.7 y)   INR used for dosin.3 (2025)   Warfarin maintenance plan:  4 mg (4 mg x 1) every Mon, Fri; 6 mg (4 mg x 1.5) all other days   Weekly warfarin total:  38 mg   Plan last modified:  Robb Kowalski, PharmD (2024)   Next INR check:  2025   Target end date:  --    Indications    Anticoagulated with warfarin [Z79.01]  Atrial fibrillation (Resolved) [I48.91]                 Anticoagulation Episode Summary       INR check location:  Home Draw    Preferred lab:  EXTERNAL    Send INR reminders to:  [facility] COUMADIN HOME MONITOR    Comments:  [employer] home meter(Tuesday) /[facility] ph [phone]  fax [phone] // (see 22 note [facility] -  [place](call for RESULTS--[phone] Acct # [id number]) // 22 for all meter updated          Anticoagulation Care Providers       Provider Role Specialty Phone number    Janet Ruelas MD [facility] Cardiology [phone]

## 2025-02-21 ENCOUNTER — ANTI-COAG VISIT (OUTPATIENT)
Dept: CARDIOLOGY | Facility: CLINIC | Age: 85
End: 2025-02-21
Payer: MEDICARE

## 2025-02-21 DIAGNOSIS — Z79.01 ANTICOAGULATED WITH WARFARIN: Primary | ICD-10-CM

## 2025-02-21 LAB — INR PPP: 2.7

## 2025-02-21 NOTE — PROGRESS NOTES
[facility]    2025 8:54 AM    Assessment/Plan:    Patient presents today with therapeutic INR.    Assessment of patient findings and chart review: Reviewed    Recommendation for patient's warfarin regimen: Continue current maintenance dose    Recommend repeat INR in 2 weeks  _________________________________________________________________    [place] (84 y.o.) is followed by the [employer] [facility].    Anticoagulation Summary  As of 2025      INR goal:  2.0-3.0   TTR:  66.7% (8.7 y)   INR used for dosin.7 (2025)   Warfarin maintenance plan:  4 mg (4 mg x 1) every Mon, Fri; 6 mg (4 mg x 1.5) all other days   Weekly warfarin total:  38 mg   Plan last modified:  Robb Kowalski, PharmD (2024)   Next INR check:  3/6/2025   Target end date:  --    Indications    Anticoagulated with warfarin [Z79.01]  Atrial fibrillation (Resolved) [I48.91]                 Anticoagulation Episode Summary       INR check location:  Home Draw    Preferred lab:  EXTERNAL    Send INR reminders to:  [facility] COUMADIN HOME MONITOR    Comments:  [employer] home meter(Tuesday) /[facility] ph [phone]  fax [phone] // (see 22 note [facility] -  [place](call for RESULTS--[phone] Acct # [id number]) // 22 for all meter updated          Anticoagulation Care Providers       Provider Role Specialty Phone number    Janet Ruelas MD [facility] Cardiology [phone]

## 2025-03-06 LAB — INR PPP: 2.5

## 2025-03-07 ENCOUNTER — ANTI-COAG VISIT (OUTPATIENT)
Dept: CARDIOLOGY | Facility: CLINIC | Age: 85
End: 2025-03-07
Payer: MEDICARE

## 2025-03-07 DIAGNOSIS — Z79.01 ANTICOAGULATED WITH WARFARIN: Primary | ICD-10-CM

## 2025-03-07 NOTE — PROGRESS NOTES
[facility]    2025 8:48 AM    Assessment/Plan:    Patient presents today with therapeutic INR.    Assessment of patient findings and chart review: Reviewed    Recommendation for patient's warfarin regimen: Continue current maintenance dose    Recommend repeat INR in 2 weeks  _________________________________________________________________    [place] (84 y.o.) is followed by the [employer] [facility] Management [facility].    Anticoagulation Summary  As of 3/7/2025      INR goal:  2.0-3.0   TTR:  66.9% (8.7 y)   INR used for dosin.5 (3/6/2025)   Warfarin maintenance plan:  4 mg (4 mg x 1) every Mon, Fri; 6 mg (4 mg x 1.5) all other days   Weekly warfarin total:  38 mg   Plan last modified:  Robb Kowalski, PharmD (2024)   Next INR check:  3/20/2025   Target end date:  --    Indications    Anticoagulated with warfarin [Z79.01]  Atrial fibrillation (Resolved) [I48.91]                 Anticoagulation Episode Summary       INR check location:  Home Draw    Preferred lab:  EXTERNAL    Send INR reminders to:  [facility] COUMADIN HOME MONITOR    Comments:  [employer] home meter(Tuesday) /[facility] ph [phone]  fax [phone] // (see 22 note [facility] -  [place](call for RESULTS--[phone] Acct # [id number]) // 22 for all meter updated          Anticoagulation Care Providers       Provider Role Specialty Phone number    Janet Ruelas MD [facility] Cardiology [phone]

## 2025-03-21 ENCOUNTER — ANTI-COAG VISIT (OUTPATIENT)
Dept: CARDIOLOGY | Facility: CLINIC | Age: 85
End: 2025-03-21
Payer: MEDICARE

## 2025-03-21 DIAGNOSIS — Z79.01 ANTICOAGULATED WITH WARFARIN: Primary | ICD-10-CM

## 2025-03-21 LAB — INR PPP: 3.2

## 2025-03-21 NOTE — PROGRESS NOTES
[facility]    03/21/2025 9:09 AM    Assessment/Plan:    Patient presents today with supratherapeutic INR.    Assessment of patient findings and chart review: Reviewed    Recommendation for patient's warfarin regimen: Continue current maintenance dose + serving of greens 3/21    Recommend repeat INR in 2 weeks  _________________________________________________________________    [place] (84 y.o.) is followed by the [employer] [facility]ticoagulation Management Program.    Anticoagulation Summary  As of 3/21/2025      INR goal:  2.0-3.0   TTR:  66.9% (8.8 y)   INR used for dosing:  3.2 (3/20/2025)   Warfarin maintenance plan:  4 mg (4 mg x 1) every Mon, Fri; 6 mg (4 mg x 1.5) all other days   Weekly warfarin total:  38 mg   Plan last modified:  Robb Kowalski, PharmD (8/14/2024)   Next INR check:  --   Target end date:  --    Indications    Anticoagulated with warfarin [Z79.01]  Atrial fibrillation (Resolved) [I48.91]                 Anticoagulation Episode Summary       INR check location:  Home Draw    Preferred lab:  EXTERNAL    Send INR reminders to:  [facility] COUMADIN HOME MONITOR    Comments:  [employer] home meter(Tuesday) /[facility] ph [phone]  fax [phone] // (see 7/14/22 note [facility] -  [place](call for RESULTS--[phone] Acct # [id number]) // 7/14/22 for all meter updated          Anticoagulation Care Providers       Provider Role Specialty Phone number    Janet Ruelas MD [facility] Cardiology [phone]

## 2025-04-03 ENCOUNTER — ANTI-COAG VISIT (OUTPATIENT)
Dept: CARDIOLOGY | Facility: CLINIC | Age: 85
End: 2025-04-03
Payer: MEDICARE

## 2025-04-03 DIAGNOSIS — Z79.01 ANTICOAGULATED WITH WARFARIN: Primary | ICD-10-CM

## 2025-04-03 LAB — INR PPP: 2.3

## 2025-04-03 PROCEDURE — 93793 ANTICOAG MGMT PT WARFARIN: CPT | Mod: S$GLB,,,

## 2025-04-03 NOTE — PROGRESS NOTES
[facility]    2025 10:45 AM    Assessment/Plan:    Patient presents today with therapeutic INR.    Assessment of patient findings and chart review: Reviewed    Recommendation for patient's warfarin regimen: Continue current maintenance dose    Recommend repeat INR in 2 weeks  _________________________________________________________________    Bright [place] (84 y.o.) is followed by the [facility].    Anticoagulation Summary  As of 4/3/2025      INR goal:  2.0-3.0   TTR:  66.9% (8.8 y)   INR used for dosin.3 (2025)   Warfarin maintenance plan:  4 mg (4 mg x 1) every Mon, Fri; 6 mg (4 mg x 1.5) all other days   Weekly warfarin total:  38 mg   Plan last modified:  Robb Kowalski, PharmD (2024)   Next INR check:  --   Target end date:  --    Indications    Anticoagulated with warfarin [Z79.01]  Atrial fibrillation (Resolved) [I48.91]                 Anticoagulation Episode Summary       INR check location:  Home Draw    Preferred lab:  EXTERNAL    Send INR reminders to:  [facility] COUMADIN HOME MONITOR    Comments:  [facility] home meter(Tuesday) /[facility] ph [phone]  fax [phone] // (see 22 note [facility]  [place](call for RESULTS--[phone] Acct # [id number]) // 22 for all meter updated          Anticoagulation Care Providers       Provider Role Specialty Phone number    Janet Ruelas MD [facility] Cardiology [phone]

## 2025-04-16 ENCOUNTER — ANTI-COAG VISIT (OUTPATIENT)
Dept: CARDIOLOGY | Facility: CLINIC | Age: 85
End: 2025-04-16
Payer: MEDICARE

## 2025-04-16 DIAGNOSIS — Z79.01 ANTICOAGULATED WITH WARFARIN: Primary | ICD-10-CM

## 2025-04-16 LAB — INR PPP: 3

## 2025-04-16 PROCEDURE — 93793 ANTICOAG MGMT PT WARFARIN: CPT | Mod: S$GLB,,,

## 2025-04-16 NOTE — PROGRESS NOTES
[facility]    04/16/2025 11:57 AM    Assessment/Plan:    Patient presents today with therapeutic INR.    Assessment of patient findings and chart review: Reviewed    Recommendation for patient's warfarin regimen: Continue current maintenance dose    Recommend repeat INR in 2 weeks  _________________________________________________________________    Bright [place] (84 y.o.) is followed by the [facility].    Anticoagulation Summary  As of 4/16/2025      INR goal:  2.0-3.0   TTR:  67.1% (8.8 y)   INR used for dosing:  3.0 (4/15/2025)   Warfarin maintenance plan:  4 mg (4 mg x 1) every Mon, Fri; 6 mg (4 mg x 1.5) all other days   Weekly warfarin total:  38 mg   Plan last modified:  Robb Kowalski, PharmD (8/14/2024)   Next INR check:  4/30/2025   Target end date:  --    Indications    Anticoagulated with warfarin [Z79.01]  Atrial fibrillation (Resolved) [I48.91]                 Anticoagulation Episode Summary       INR check location:  Home Draw    Preferred lab:  EXTERNAL    Send INR reminders to:  [facility] COUMADIN HOME MONITOR    Comments:  [employer] home meter(Tuesday) /[facility] ph [phone]  fax [phone] // (see 7/14/22 note [facility]  [place](call for RESULTS--[phone] Acct # [id number]) // 7/14/22 for all meter updated          Anticoagulation Care Providers       Provider Role Specialty Phone number    Janet Ruelas MD [facility] Cardiology [phone]

## 2025-05-02 ENCOUNTER — ANTI-COAG VISIT (OUTPATIENT)
Dept: CARDIOLOGY | Facility: CLINIC | Age: 85
End: 2025-05-02
Payer: MEDICARE

## 2025-05-02 DIAGNOSIS — R79.1 SUPRATHERAPEUTIC INR: ICD-10-CM

## 2025-05-02 DIAGNOSIS — Z79.01 ANTICOAGULATED WITH WARFARIN: Primary | ICD-10-CM

## 2025-05-02 LAB — INR PPP: 1.9

## 2025-05-15 LAB — INR PPP: 2.8

## 2025-05-16 ENCOUNTER — ANTI-COAG VISIT (OUTPATIENT)
Dept: CARDIOLOGY | Facility: CLINIC | Age: 85
End: 2025-05-16
Payer: MEDICARE

## 2025-05-16 DIAGNOSIS — Z79.01 ANTICOAGULATED WITH WARFARIN: Primary | ICD-10-CM

## 2025-05-16 NOTE — PROGRESS NOTES
[facility]    2025 8:15 AM    Assessment/Plan:    Patient presents today with therapeutic INR.    Assessment of patient findings and chart review: no changes noted since last INR    Recommendation for patient's warfarin regimen: Continue current maintenance dose    Recommend repeat INR in 2 weeks  _________________________________________________________________    Bright [place] (84 y.o.) is followed by the [facility].    Anticoagulation Summary  As of 2025      INR goal:  2.0-3.0   TTR:  67.3% (8.9 y)   INR used for dosin.8 (5/15/2025)   Warfarin maintenance plan:  4 mg (4 mg x 1) every Mon, Fri; 6 mg (4 mg x 1.5) all other days   Weekly warfarin total:  38 mg   Plan last modified:  Robb Kowalski, PharmD (2024)   Next INR check:  2025   Target end date:  --    Indications    Anticoagulated with warfarin [Z79.01]  Atrial fibrillation (Resolved) [I48.91]                 Anticoagulation Episode Summary       INR check location:  Home Draw    Preferred lab:  EXTERNAL    Send INR reminders to:  [facility] COUMADIN HOME MONITOR    Comments:  [employer] home meter(Tuesday) /[facility] ph [phone]  fax [phone] // (see 22 note [facility]  [place](call for RESULTS--[phone] Acct # [id number]) // 22 for all meter updated          Anticoagulation Care Providers       Provider Role Specialty Phone number    Janet Ruelas MD [facility] Cardiology [phone]

## 2025-05-28 ENCOUNTER — ANTI-COAG VISIT (OUTPATIENT)
Dept: CARDIOLOGY | Facility: CLINIC | Age: 85
End: 2025-05-28
Payer: MEDICARE

## 2025-05-28 DIAGNOSIS — Z79.01 ANTICOAGULATED WITH WARFARIN: Primary | ICD-10-CM

## 2025-05-28 LAB — INR PPP: 2.7

## 2025-05-28 PROCEDURE — 93793 ANTICOAG MGMT PT WARFARIN: CPT | Mod: S$GLB,,,

## 2025-05-28 NOTE — PROGRESS NOTES
[email or web address]    2025 8:32 AM    Assessment/Plan:    Patient presents today with therapeutic INR.    Assessment of patient findings and chart review: Reviewed    Recommendation for patient's warfarin regimen: Continue current maintenance dose    Recommend repeat INR in 2 weeks  _________________________________________________________________    Bright [place] (84 y.o.) is followed by the [facility].    Anticoagulation Summary  As of 2025      INR goal:  2.0-3.0   TTR:  67.4% (9 y)   INR used for dosin.7 (2025)   Warfarin maintenance plan:  4 mg (4 mg x 1) every Mon, Fri; 6 mg (4 mg x 1.5) all other days   Weekly warfarin total:  38 mg   Plan last modified:  Robb Kowalski, PharmD (2024)   Next INR check:  2025   Target end date:  --    Indications    Anticoagulated with warfarin [Z79.01]  Atrial fibrillation (Resolved) [I48.91]                 Anticoagulation Episode Summary       INR check location:  Home Draw    Preferred lab:  EXTERNAL    Send INR reminders to:  [facility] COUMADIN HOME MONITOR    Comments:  [employer] home meter(Tuesday) /[facility] ph [phone]  fax [phone] // (see 22 note [facility]  [place](call for RESULTS--[phone] Acct # [id number]) // 22 for all meter updated          Anticoagulation Care Providers       Provider Role Specialty Phone number    Janet Ruelas MD [facility] Cardiology [phone]

## 2025-06-17 ENCOUNTER — ANTI-COAG VISIT (OUTPATIENT)
Dept: CARDIOLOGY | Facility: CLINIC | Age: 85
End: 2025-06-17
Payer: MEDICARE

## 2025-06-17 DIAGNOSIS — Z79.01 ANTICOAGULATED WITH WARFARIN: Primary | ICD-10-CM

## 2025-06-17 LAB — INR PPP: 4.2

## 2025-06-17 PROCEDURE — 93793 ANTICOAG MGMT PT WARFARIN: CPT | Mod: S$GLB,,,

## 2025-06-17 NOTE — PROGRESS NOTES
[facility]    2025 3:28 PM    Assessment/Plan:    Patient presents today with supratherapeutic INR.    Assessment of patient findings and chart review: Pt had a decrease in vit k intake.     Recommendation for patient's warfarin regimen: Hold dose today then resume current maintenance dose.  Resume normal diet.     Recommend repeat INR in 1 week  _________________________________________________________________    [place] (84 y.o.) is followed by the [facility].    Anticoagulation Summary  As of 2025      INR goal:  2.0-3.0   TTR:  67.1% (9 y)   INR used for dosin.2 (2025)   Warfarin maintenance plan:  4 mg (4 mg x 1) every Mon, Fri; 6 mg (4 mg x 1.5) all other days   Weekly warfarin total:  38 mg   Plan last modified:  Robb Kowalski, PharmD (2024)   Next INR check:  --   Target end date:  --    Indications    Anticoagulated with warfarin [Z79.01]  Atrial fibrillation (Resolved) [I48.91]                 Anticoagulation Episode Summary       INR check location:  Home Draw    Preferred lab:  EXTERNAL    Send INR reminders to:  [facility] COUMADIN HOME MONITOR    Comments:  [employer] home meter(Tuesday) /[facility] ph [phone]  fax [phone] // (see 22 note [facility]  [place](call for RESULTS--[phone] Acct # [id number]) // 22 for all meter updated          Anticoagulation Care Providers       Provider Role Specialty Phone number    Janet Ruelas MD [facility] Cardiology [phone]

## 2025-06-25 ENCOUNTER — ANTI-COAG VISIT (OUTPATIENT)
Dept: CARDIOLOGY | Facility: CLINIC | Age: 85
End: 2025-06-25
Payer: MEDICARE

## 2025-06-25 DIAGNOSIS — Z79.01 ANTICOAGULATED WITH WARFARIN: Primary | ICD-10-CM

## 2025-06-25 LAB — INR PPP: 4

## 2025-06-25 PROCEDURE — 93793 ANTICOAG MGMT PT WARFARIN: CPT | Mod: S$GLB,,,

## 2025-06-25 NOTE — PROGRESS NOTES
[facility]    2025 9:32 AM    Assessment/Plan:    Patient presents today with supratherapeutic INR.    Assessment of patient findings and chart review: Pt resuming normal diet has not brought INR down. Recommend to decrease maintenance dose.     Recommendation for patient's warfarin regimen: Hold warfarin & decrease maintenance dose per calendar.     Recommend repeat INR Monday.   _________________________________________________________________    [place] (84 y.o.) is followed by the [facility].    Anticoagulation Summary  As of 2025      INR goal:  2.0-3.0   TTR:  66.9% (9 y)   INR used for dosin.0 (2025)   Warfarin maintenance plan:  4 mg (4 mg x 1) every Mon, Wed, Fri; 6 mg (4 mg x 1.5) all other days   Weekly warfarin total:  36 mg   Plan last modified:  Robb Kowalski, PharmD (2025)   Next INR check:  2025   Target end date:  --    Indications    Anticoagulated with warfarin [Z79.01]  Atrial fibrillation (Resolved) [I48.91]                 Anticoagulation Episode Summary       INR check location:  Home Draw    Preferred lab:  EXTERNAL    Send INR reminders to:  [facility] COUMADIN HOME MONITOR    Comments:  [employer] home meter(Tuesday) /[facility] ph [phone]  fax [phone] // (see 22 note [facility]  [place](call for RESULTS--[phone] Acct # [id number]) // 22 for all meter updated          Anticoagulation Care Providers       Provider Role Specialty Phone number    Janet Ruelas MD [facility] Cardiology [phone]

## 2025-07-01 ENCOUNTER — ANTI-COAG VISIT (OUTPATIENT)
Dept: CARDIOLOGY | Facility: CLINIC | Age: 85
End: 2025-07-01
Payer: MEDICARE

## 2025-07-01 DIAGNOSIS — Z79.01 ANTICOAGULATED WITH WARFARIN: Primary | ICD-10-CM

## 2025-07-01 LAB — INR PPP: 2.9

## 2025-07-01 PROCEDURE — 93793 ANTICOAG MGMT PT WARFARIN: CPT | Mod: S$GLB,,,

## 2025-07-01 NOTE — PROGRESS NOTES
[facility]    2025 8:57 AM    Assessment/Plan:    Patient presents today with therapeutic INR.    Assessment of patient findings and chart review: Reviewed    Recommendation for patient's warfarin regimen: Continue current maintenance dose    Recommend repeat INR in 2 weeks  _________________________________________________________________    [place] (84 y.o.) is followed by the [facility].    Anticoagulation Summary  As of 2025      INR goal:  2.0-3.0   TTR:  66.8% (9.1 y)   INR used for dosin.9 (2025)   Warfarin maintenance plan:  4 mg (4 mg x 1) every Mon, Wed, Fri; 6 mg (4 mg x 1.5) all other days   Weekly warfarin total:  36 mg   Plan last modified:  Robb Kowalski, PharmD (2025)   Next INR check:  2025   Target end date:  --    Indications    Anticoagulated with warfarin [Z79.01]  Atrial fibrillation (Resolved) [I48.91]                 Anticoagulation Episode Summary       INR check location:  Home Draw    Preferred lab:  EXTERNAL    Send INR reminders to:  [facility] COUMADIN HOME MONITOR    Comments:  [employer] home meter(Tuesday) /[facility] ph [phone]  fax [phone] // (see 22 note [facility]  [place](call for RESULTS--[phone] Acct # [id number]) // 22 for all meter updated          Anticoagulation Care Providers       Provider Role Specialty Phone number    Janet Ruelas MD [facility] Cardiology [phone]

## 2025-07-14 ENCOUNTER — ANTI-COAG VISIT (OUTPATIENT)
Dept: CARDIOLOGY | Facility: CLINIC | Age: 85
End: 2025-07-14
Payer: MEDICARE

## 2025-07-14 DIAGNOSIS — Z79.01 ANTICOAGULATED WITH WARFARIN: Primary | ICD-10-CM

## 2025-07-14 LAB — INR PPP: 2.5

## 2025-07-14 PROCEDURE — 93793 ANTICOAG MGMT PT WARFARIN: CPT | Mod: S$GLB,,,

## 2025-07-14 NOTE — PROGRESS NOTES
[facility]    2025 9:13 AM    Assessment/Plan:    Patient presents today with therapeutic INR.    Assessment of patient findings and chart review: Reviewed    Recommendation for patient's warfarin regimen: Continue current maintenance dose    Recommend repeat INR in 2 weeks  _________________________________________________________________    [place] (84 y.o.) is followed by the [facility] [employer] [facility].    Anticoagulation Summary  As of 2025      INR goal:  2.0-3.0   TTR:  67.0% (9.1 y)   INR used for dosin.5 (2025)   Warfarin maintenance plan:  4 mg (4 mg x 1) every Mon, Wed, Fri; 6 mg (4 mg x 1.5) all other days   Weekly warfarin total:  36 mg   Plan last modified:  Robb Kowalski, PharmD (2025)   Next INR check:  2025   Target end date:  --    Indications    Anticoagulated with warfarin [Z79.01]  Atrial fibrillation (Resolved) [I48.91]                 Anticoagulation Episode Summary       INR check location:  Home Draw    Preferred lab:  EXTERNAL    Send INR reminders to:  [facility] COUMADIN HOME MONITOR    Comments:  [employer] home meter(Tuesday) /[facility] ph [phone]  fax [phone] // (see 22 note [facility]  [place](call for RESULTS--[phone] Acct # [id number]) // 22 for all meter updated          Anticoagulation Care Providers       Provider Role Specialty Phone number    Janet Ruelas MD [facility] Cardiology [phone]

## 2025-07-28 ENCOUNTER — ANTI-COAG VISIT (OUTPATIENT)
Dept: CARDIOLOGY | Facility: CLINIC | Age: 85
End: 2025-07-28
Payer: MEDICARE

## 2025-07-28 DIAGNOSIS — Z79.01 ANTICOAGULATED WITH WARFARIN: Primary | ICD-10-CM

## 2025-07-28 LAB — INR PPP: 2.8

## 2025-07-28 PROCEDURE — 93793 ANTICOAG MGMT PT WARFARIN: CPT | Mod: S$GLB,,,

## 2025-07-28 NOTE — PROGRESS NOTES
[facility]    2025 8:43 AM    Assessment/Plan:    Patient presents today with therapeutic INR.    Assessment of patient findings and chart review: Reviewed    Recommendation for patient's warfarin regimen: Continue current maintenance dose    Recommend repeat INR in 2 weeks  _________________________________________________________________    [place] (84 y.o.) is followed by the [employer] [facility]tico[facility].    Anticoagulation Summary  As of 2025      INR goal:  2.0-3.0   TTR:  67.1% (9.1 y)   INR used for dosin.8 (2025)   Warfarin maintenance plan:  4 mg (4 mg x 1) every Mon, Wed, Fri; 6 mg (4 mg x 1.5) all other days   Weekly warfarin total:  36 mg   Plan last modified:  Robb Kowalski, PharmD (2025)   Next INR check:  2025   Target end date:  --    Indications    Anticoagulated with warfarin [Z79.01]  Atrial fibrillation (Resolved) [I48.91]                 Anticoagulation Episode Summary       INR check location:  Home Draw    Preferred lab:  EXTERNAL    Send INR reminders to:  [facility] COUMADIN HOME MONITOR    Comments:  [employer] home meter(Tuesday) /[facility] ph [phone]  fax [phone] // (see 22 note [facility]  [place](call for RESULTS--[phone] Acct # [id number]) // 22 for all meter updated          Anticoagulation Care Providers       Provider Role Specialty Phone number    Janet Ruelas MD [facility] Cardiology [phone]

## 2025-08-11 ENCOUNTER — ANTI-COAG VISIT (OUTPATIENT)
Dept: CARDIOLOGY | Facility: CLINIC | Age: 85
End: 2025-08-11
Payer: MEDICARE

## 2025-08-11 DIAGNOSIS — Z79.01 ANTICOAGULATED WITH WARFARIN: Primary | ICD-10-CM

## 2025-08-11 LAB — INR PPP: 2.5

## 2025-08-11 PROCEDURE — 93793 ANTICOAG MGMT PT WARFARIN: CPT | Mod: S$GLB,,,

## 2025-08-25 ENCOUNTER — ANTI-COAG VISIT (OUTPATIENT)
Dept: CARDIOLOGY | Facility: CLINIC | Age: 85
End: 2025-08-25
Payer: MEDICARE

## 2025-08-25 DIAGNOSIS — Z79.01 ANTICOAGULATED WITH WARFARIN: Primary | ICD-10-CM

## 2025-08-25 LAB — INR PPP: 2.8

## 2025-08-25 PROCEDURE — 93793 ANTICOAG MGMT PT WARFARIN: CPT | Mod: S$GLB,,,
